# Patient Record
Sex: MALE | Race: ASIAN | NOT HISPANIC OR LATINO | Employment: OTHER
[De-identification: names, ages, dates, MRNs, and addresses within clinical notes are randomized per-mention and may not be internally consistent; named-entity substitution may affect disease eponyms.]

---

## 2020-01-01 ENCOUNTER — EXTERNAL RECORD (OUTPATIENT)
Dept: HEALTH INFORMATION MANAGEMENT | Facility: OTHER | Age: 80
End: 2020-01-01

## 2020-11-12 PROBLEM — E11.22 TYPE 2 DIABETES MELLITUS WITH CHRONIC KIDNEY DISEASE ON CHRONIC DIALYSIS, WITH LONG-TERM CURRENT USE OF INSULIN (HCC): Status: ACTIVE | Noted: 2020-11-12

## 2020-11-12 PROBLEM — I15.2 HYPERTENSION ASSOCIATED WITH DIABETES (HCC): Status: ACTIVE | Noted: 2020-11-12

## 2020-11-12 PROBLEM — E11.59 HYPERTENSION ASSOCIATED WITH DIABETES (HCC): Status: ACTIVE | Noted: 2020-11-12

## 2020-11-12 PROBLEM — N18.6 TYPE 2 DIABETES MELLITUS WITH CHRONIC KIDNEY DISEASE ON CHRONIC DIALYSIS, WITH LONG-TERM CURRENT USE OF INSULIN (HCC): Status: ACTIVE | Noted: 2020-11-12

## 2020-11-12 PROBLEM — Z99.2 TYPE 2 DIABETES MELLITUS WITH CHRONIC KIDNEY DISEASE ON CHRONIC DIALYSIS, WITH LONG-TERM CURRENT USE OF INSULIN (HCC): Status: ACTIVE | Noted: 2020-11-12

## 2020-11-12 PROBLEM — E11.319 TYPE 2 DIABETES MELLITUS WITH RETINOPATHY, WITH LONG-TERM CURRENT USE OF INSULIN, MACULAR EDEMA PRESENCE UNSPECIFIED, UNSPECIFIED LATERALITY, UNSPECIFIED RETINOPATHY SEVERITY (HCC): Status: ACTIVE | Noted: 2020-11-12

## 2020-11-12 PROBLEM — E11.59 HYPERTENSION ASSOCIATED WITH DIABETES: Status: ACTIVE | Noted: 2020-11-12

## 2020-11-12 PROBLEM — Z79.4 TYPE 2 DIABETES MELLITUS WITH RETINOPATHY, WITH LONG-TERM CURRENT USE OF INSULIN, MACULAR EDEMA PRESENCE UNSPECIFIED, UNSPECIFIED LATERALITY, UNSPECIFIED RETINOPATHY SEVERITY (HCC): Status: ACTIVE | Noted: 2020-11-12

## 2020-11-12 PROBLEM — I15.2 HYPERTENSION ASSOCIATED WITH DIABETES  (HCC): Status: ACTIVE | Noted: 2020-11-12

## 2020-11-12 PROBLEM — E11.59 HYPERTENSION ASSOCIATED WITH DIABETES  (HCC): Status: ACTIVE | Noted: 2020-11-12

## 2020-11-12 PROBLEM — E11.42 TYPE 2 DIABETES MELLITUS WITH POLYNEUROPATHY (HCC): Status: ACTIVE | Noted: 2020-11-12

## 2020-11-12 PROBLEM — Z79.4 TYPE 2 DIABETES MELLITUS WITH CHRONIC KIDNEY DISEASE ON CHRONIC DIALYSIS, WITH LONG-TERM CURRENT USE OF INSULIN (HCC): Status: ACTIVE | Noted: 2020-11-12

## 2020-11-12 PROBLEM — I15.2 HYPERTENSION ASSOCIATED WITH DIABETES: Status: ACTIVE | Noted: 2020-11-12

## 2020-12-01 ENCOUNTER — OFFICE VISIT (OUTPATIENT)
Dept: CARDIOLOGY | Age: 80
End: 2020-12-01

## 2020-12-01 VITALS
SYSTOLIC BLOOD PRESSURE: 144 MMHG | HEART RATE: 64 BPM | DIASTOLIC BLOOD PRESSURE: 70 MMHG | WEIGHT: 182 LBS | HEIGHT: 69 IN | BODY MASS INDEX: 26.96 KG/M2

## 2020-12-01 DIAGNOSIS — I44.7 LBBB (LEFT BUNDLE BRANCH BLOCK): Primary | ICD-10-CM

## 2020-12-01 DIAGNOSIS — Z99.2 CKD (CHRONIC KIDNEY DISEASE) REQUIRING CHRONIC DIALYSIS (CMD): ICD-10-CM

## 2020-12-01 DIAGNOSIS — N18.6 CKD (CHRONIC KIDNEY DISEASE) REQUIRING CHRONIC DIALYSIS (CMD): ICD-10-CM

## 2020-12-01 DIAGNOSIS — I10 ESSENTIAL HYPERTENSION: ICD-10-CM

## 2020-12-01 DIAGNOSIS — I95.1 ORTHOSTATIC HYPOTENSION: ICD-10-CM

## 2020-12-01 PROCEDURE — 3077F SYST BP >= 140 MM HG: CPT | Performed by: INTERNAL MEDICINE

## 2020-12-01 PROCEDURE — 99245 OFF/OP CONSLTJ NEW/EST HI 55: CPT | Performed by: INTERNAL MEDICINE

## 2020-12-01 PROCEDURE — 3078F DIAST BP <80 MM HG: CPT | Performed by: INTERNAL MEDICINE

## 2020-12-01 PROCEDURE — 93000 ELECTROCARDIOGRAM COMPLETE: CPT | Performed by: INTERNAL MEDICINE

## 2020-12-01 RX ORDER — MIDODRINE HYDROCHLORIDE 5 MG/1
TABLET ORAL
COMMUNITY

## 2020-12-01 RX ORDER — AMLODIPINE BESYLATE 10 MG/1
10 TABLET ORAL DAILY
COMMUNITY
Start: 2020-10-14

## 2020-12-01 RX ORDER — INSULIN LISPRO 100 [IU]/ML
INJECTION, SUSPENSION SUBCUTANEOUS
COMMUNITY
Start: 2020-11-12

## 2020-12-01 RX ORDER — CLONAZEPAM 0.5 MG/1
TABLET ORAL
COMMUNITY
Start: 2020-11-25

## 2020-12-01 RX ORDER — GABAPENTIN 600 MG/1
600 TABLET ORAL 3 TIMES DAILY
COMMUNITY
Start: 2020-10-14

## 2020-12-01 RX ORDER — CALCIUM ACETATE 667 MG/1
CAPSULE ORAL
COMMUNITY

## 2020-12-01 RX ORDER — ESCITALOPRAM OXALATE 20 MG/1
TABLET ORAL
COMMUNITY
Start: 2020-10-14 | End: 2021-10-05 | Stop reason: DRUGHIGH

## 2020-12-01 RX ORDER — HYDRALAZINE HYDROCHLORIDE 25 MG/1
TABLET, FILM COATED ORAL
COMMUNITY
Start: 2020-11-04

## 2020-12-01 RX ORDER — ASPIRIN 81 MG/1
TABLET, CHEWABLE ORAL
COMMUNITY

## 2020-12-01 RX ORDER — FOLIC ACID 1 MG/1
1000 TABLET ORAL DAILY
COMMUNITY
Start: 2020-10-14

## 2020-12-01 RX ORDER — LENALIDOMIDE 5 MG/1
CAPSULE ORAL
COMMUNITY

## 2020-12-01 RX ORDER — CALCITRIOL 0.5 UG/1
CAPSULE, LIQUID FILLED ORAL
COMMUNITY

## 2020-12-01 RX ORDER — LOSARTAN POTASSIUM 100 MG/1
100 TABLET ORAL DAILY
COMMUNITY
Start: 2020-10-14

## 2020-12-01 RX ORDER — DOCUSATE SODIUM 100 MG/1
CAPSULE, LIQUID FILLED ORAL
COMMUNITY

## 2020-12-01 ASSESSMENT — ENCOUNTER SYMPTOMS
ALLERGIC/IMMUNOLOGIC COMMENTS: NO NEW FOOD ALLERGIES
HEMATOCHEZIA: 0
HEMOPTYSIS: 0
WEIGHT GAIN: 0
WEIGHT LOSS: 0
COUGH: 0
BRUISES/BLEEDS EASILY: 0
SUSPICIOUS LESIONS: 0
FEVER: 0
CHILLS: 0

## 2020-12-01 ASSESSMENT — PATIENT HEALTH QUESTIONNAIRE - PHQ9
SUM OF ALL RESPONSES TO PHQ9 QUESTIONS 1 AND 2: 0
1. LITTLE INTEREST OR PLEASURE IN DOING THINGS: NOT AT ALL
2. FEELING DOWN, DEPRESSED OR HOPELESS: NOT AT ALL
CLINICAL INTERPRETATION OF PHQ2 SCORE: NO FURTHER SCREENING NEEDED
SUM OF ALL RESPONSES TO PHQ9 QUESTIONS 1 AND 2: 0
CLINICAL INTERPRETATION OF PHQ9 SCORE: NO FURTHER SCREENING NEEDED

## 2020-12-02 ENCOUNTER — ORDER TRANSCRIPTION (OUTPATIENT)
Dept: ADMINISTRATIVE | Facility: HOSPITAL | Age: 80
End: 2020-12-02

## 2020-12-02 ENCOUNTER — OFFICE VISIT (OUTPATIENT)
Dept: FAMILY MEDICINE CLINIC | Facility: CLINIC | Age: 80
End: 2020-12-02
Payer: MEDICAID

## 2020-12-02 ENCOUNTER — MED REC SCAN ONLY (OUTPATIENT)
Dept: FAMILY MEDICINE CLINIC | Facility: CLINIC | Age: 80
End: 2020-12-02

## 2020-12-02 ENCOUNTER — PATIENT MESSAGE (OUTPATIENT)
Dept: FAMILY MEDICINE CLINIC | Facility: CLINIC | Age: 80
End: 2020-12-02

## 2020-12-02 VITALS
SYSTOLIC BLOOD PRESSURE: 146 MMHG | RESPIRATION RATE: 16 BRPM | BODY MASS INDEX: 26.66 KG/M2 | HEART RATE: 67 BPM | HEIGHT: 69 IN | TEMPERATURE: 97 F | WEIGHT: 180 LBS | OXYGEN SATURATION: 96 % | DIASTOLIC BLOOD PRESSURE: 68 MMHG

## 2020-12-02 DIAGNOSIS — Z99.2 TYPE 2 DIABETES MELLITUS WITH CHRONIC KIDNEY DISEASE ON CHRONIC DIALYSIS, WITH LONG-TERM CURRENT USE OF INSULIN (HCC): ICD-10-CM

## 2020-12-02 DIAGNOSIS — Z79.4 TYPE 2 DIABETES MELLITUS WITH CHRONIC KIDNEY DISEASE ON CHRONIC DIALYSIS, WITH LONG-TERM CURRENT USE OF INSULIN (HCC): ICD-10-CM

## 2020-12-02 DIAGNOSIS — E11.22 TYPE 2 DIABETES MELLITUS WITH CHRONIC KIDNEY DISEASE ON CHRONIC DIALYSIS, WITH LONG-TERM CURRENT USE OF INSULIN (HCC): ICD-10-CM

## 2020-12-02 DIAGNOSIS — Z13.9 ENCOUNTER FOR SCREENING: Primary | ICD-10-CM

## 2020-12-02 DIAGNOSIS — Z99.2 CKD (CHRONIC KIDNEY DISEASE) REQUIRING CHRONIC DIALYSIS (HCC): ICD-10-CM

## 2020-12-02 DIAGNOSIS — N18.6 CKD (CHRONIC KIDNEY DISEASE) REQUIRING CHRONIC DIALYSIS (HCC): ICD-10-CM

## 2020-12-02 DIAGNOSIS — C90.00 MULTIPLE MYELOMA, REMISSION STATUS UNSPECIFIED (HCC): ICD-10-CM

## 2020-12-02 DIAGNOSIS — Z00.00 ENCOUNTER FOR ANNUAL PHYSICAL EXAM: Primary | ICD-10-CM

## 2020-12-02 DIAGNOSIS — E11.59 HYPERTENSION ASSOCIATED WITH DIABETES (HCC): ICD-10-CM

## 2020-12-02 DIAGNOSIS — N18.6 TYPE 2 DIABETES MELLITUS WITH CHRONIC KIDNEY DISEASE ON CHRONIC DIALYSIS, WITH LONG-TERM CURRENT USE OF INSULIN (HCC): ICD-10-CM

## 2020-12-02 DIAGNOSIS — I15.2 HYPERTENSION ASSOCIATED WITH DIABETES (HCC): ICD-10-CM

## 2020-12-02 PROBLEM — I95.1 ORTHOSTATIC HYPOTENSION: Status: ACTIVE | Noted: 2020-12-01

## 2020-12-02 PROBLEM — I44.7 LBBB (LEFT BUNDLE BRANCH BLOCK): Status: ACTIVE | Noted: 2020-12-01

## 2020-12-02 PROBLEM — N18.9 CHRONIC KIDNEY FAILURE: Status: ACTIVE | Noted: 2020-12-02

## 2020-12-02 PROCEDURE — 3078F DIAST BP <80 MM HG: CPT | Performed by: FAMILY MEDICINE

## 2020-12-02 PROCEDURE — 3077F SYST BP >= 140 MM HG: CPT | Performed by: FAMILY MEDICINE

## 2020-12-02 PROCEDURE — 99397 PER PM REEVAL EST PAT 65+ YR: CPT | Performed by: FAMILY MEDICINE

## 2020-12-02 PROCEDURE — 3008F BODY MASS INDEX DOCD: CPT | Performed by: FAMILY MEDICINE

## 2020-12-02 RX ORDER — MIDODRINE HYDROCHLORIDE 5 MG/1
TABLET ORAL
COMMUNITY
End: 2020-12-02

## 2020-12-03 ENCOUNTER — PATIENT MESSAGE (OUTPATIENT)
Dept: FAMILY MEDICINE CLINIC | Facility: CLINIC | Age: 80
End: 2020-12-03

## 2020-12-03 NOTE — PROGRESS NOTES
/68   Pulse 67   Temp 96.6 °F (35.9 °C) (Temporal)   Resp 16   Ht 5' 9\" (1.753 m)   Wt 180 lb (81.6 kg)   SpO2 96%   BMI 26.58 kg/m²  Body mass index is 26.58 kg/m².      Patient presents with:  Establish Care  Physical      Sharmila Mahoney is a 79 ye 1   • Continuous Blood Gluc  (DEXCOM G6 ) Does not apply Device 1 strip by In Vitro route 5 (five) times daily. • amLODIPine Besylate 10 MG Oral Tab Take 10 mg by mouth.      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 Shashank Chavez  Multiple myeloma, remission status unspecified ([facility].)  Stable at this time  Follow-up

## 2020-12-03 NOTE — TELEPHONE ENCOUNTER
From: Akshat Kelly  To: Navneet Monique MD  Sent: 12/2/2020 2:09 PM CST  Subject: Referral Request    Suhas Dr Ella Bernard,  I was trying to schedule for 24 hour blood pressure monitor and ct heart calcium scoring for my dad.  I was informed that the insurance requir

## 2020-12-03 NOTE — TELEPHONE ENCOUNTER
See other PM 12/3/20:    Suellen Cortez to Jaems Houser MD    12/3/20 11:12 AM  please ignore my previous email. I was mis informed. His tests does not require referral at this time. sorry for the inconvenience. Thank you.

## 2020-12-03 NOTE — TELEPHONE ENCOUNTER
From: Patricia Mortensen  To: Carlyn Peters MD  Sent: 12/3/2020 11:12 AM CST  Subject: Referral Request    please ignore my previous email. I was mis informed. His tests does not require referral at this time. sorry for the inconvenience. Thank you.

## 2020-12-04 ENCOUNTER — ANCILLARY PROCEDURE (OUTPATIENT)
Dept: CARDIOLOGY | Age: 80
End: 2020-12-04
Attending: INTERNAL MEDICINE

## 2020-12-04 DIAGNOSIS — I95.1 ORTHOSTATIC HYPOTENSION: ICD-10-CM

## 2020-12-04 DIAGNOSIS — I44.7 LBBB (LEFT BUNDLE BRANCH BLOCK): ICD-10-CM

## 2020-12-04 DIAGNOSIS — I10 ESSENTIAL HYPERTENSION: ICD-10-CM

## 2020-12-04 DIAGNOSIS — N18.6 CKD (CHRONIC KIDNEY DISEASE) REQUIRING CHRONIC DIALYSIS (CMD): ICD-10-CM

## 2020-12-04 DIAGNOSIS — Z99.2 CKD (CHRONIC KIDNEY DISEASE) REQUIRING CHRONIC DIALYSIS (CMD): ICD-10-CM

## 2020-12-04 PROCEDURE — 93784 AMBL BP MNTR W/SOFTWARE: CPT | Performed by: INTERNAL MEDICINE

## 2020-12-07 ENCOUNTER — TELEPHONE (OUTPATIENT)
Dept: CARDIOLOGY | Age: 80
End: 2020-12-07

## 2020-12-08 ENCOUNTER — TELEPHONE (OUTPATIENT)
Dept: CARDIOLOGY | Age: 80
End: 2020-12-08

## 2020-12-14 ENCOUNTER — HOSPITAL ENCOUNTER (OUTPATIENT)
Dept: CT IMAGING | Facility: HOSPITAL | Age: 80
Discharge: HOME | End: 2020-12-14
Attending: INTERNAL MEDICINE

## 2020-12-14 DIAGNOSIS — Z13.9 ENCOUNTER FOR SCREENING: ICD-10-CM

## 2020-12-14 DIAGNOSIS — Z13.6 SCREENING FOR CARDIOVASCULAR CONDITION: ICD-10-CM

## 2020-12-15 NOTE — PROGRESS NOTES
Pt seen at [facility] for CTHS:  PRELIMINARY LAEMM=312.16    *patient denies any chest pain/pressure or shortness of breath at this time. Phone consultation complete.   All results and risk factors discussed with patient and daughter Johana Cerrato; all qu

## 2020-12-17 ENCOUNTER — TELEPHONE (OUTPATIENT)
Dept: FAMILY MEDICINE CLINIC | Facility: CLINIC | Age: 80
End: 2020-12-17

## 2021-01-05 ENCOUNTER — OFFICE VISIT (OUTPATIENT)
Dept: CARDIOLOGY | Age: 81
End: 2021-01-05

## 2021-01-05 VITALS
WEIGHT: 176 LBS | HEIGHT: 69 IN | HEART RATE: 60 BPM | SYSTOLIC BLOOD PRESSURE: 150 MMHG | BODY MASS INDEX: 26.07 KG/M2 | DIASTOLIC BLOOD PRESSURE: 70 MMHG

## 2021-01-05 DIAGNOSIS — R93.1 ELEVATED CORONARY ARTERY CALCIUM SCORE: ICD-10-CM

## 2021-01-05 DIAGNOSIS — I44.7 LBBB (LEFT BUNDLE BRANCH BLOCK): ICD-10-CM

## 2021-01-05 DIAGNOSIS — I95.1 ORTHOSTATIC HYPOTENSION: Primary | ICD-10-CM

## 2021-01-05 DIAGNOSIS — Z99.2 CKD (CHRONIC KIDNEY DISEASE) REQUIRING CHRONIC DIALYSIS (CMD): ICD-10-CM

## 2021-01-05 DIAGNOSIS — E78.00 HYPERCHOLESTEREMIA: ICD-10-CM

## 2021-01-05 DIAGNOSIS — N18.6 CKD (CHRONIC KIDNEY DISEASE) REQUIRING CHRONIC DIALYSIS (CMD): ICD-10-CM

## 2021-01-05 PROBLEM — Z86.73 HISTORY OF STROKE: Status: ACTIVE | Noted: 2021-01-05

## 2021-01-05 PROCEDURE — 3077F SYST BP >= 140 MM HG: CPT | Performed by: INTERNAL MEDICINE

## 2021-01-05 PROCEDURE — 99215 OFFICE O/P EST HI 40 MIN: CPT | Performed by: INTERNAL MEDICINE

## 2021-01-05 PROCEDURE — 3078F DIAST BP <80 MM HG: CPT | Performed by: INTERNAL MEDICINE

## 2021-01-05 RX ORDER — ROSUVASTATIN CALCIUM 10 MG/1
10 TABLET, COATED ORAL DAILY
Qty: 90 TABLET | Refills: 3 | Status: SHIPPED | OUTPATIENT
Start: 2021-01-05 | End: 2021-12-21 | Stop reason: SDUPTHER

## 2021-01-05 RX ORDER — PROCHLORPERAZINE 25 MG/1
SUPPOSITORY RECTAL
COMMUNITY
Start: 2020-12-05

## 2021-01-05 ASSESSMENT — ENCOUNTER SYMPTOMS
WEIGHT GAIN: 0
COUGH: 0
WEIGHT LOSS: 0
CHILLS: 0
BRUISES/BLEEDS EASILY: 0
FEVER: 0
ALLERGIC/IMMUNOLOGIC COMMENTS: NO NEW FOOD ALLERGIES
HEMATOCHEZIA: 0
HEMOPTYSIS: 0
SUSPICIOUS LESIONS: 0

## 2021-01-06 ENCOUNTER — TELEPHONE (OUTPATIENT)
Dept: FAMILY MEDICINE CLINIC | Facility: CLINIC | Age: 81
End: 2021-01-06

## 2021-01-27 DIAGNOSIS — Z23 NEED FOR VACCINATION: ICD-10-CM

## 2021-01-31 ENCOUNTER — IMMUNIZATION (OUTPATIENT)
Dept: LAB | Age: 81
End: 2021-01-31
Attending: HOSPITALIST
Payer: MEDICAID

## 2021-01-31 DIAGNOSIS — Z23 NEED FOR VACCINATION: Primary | ICD-10-CM

## 2021-01-31 PROCEDURE — 0001A SARSCOV2 VAC 30MCG/0.3ML IM: CPT

## 2021-02-03 ENCOUNTER — APPOINTMENT (OUTPATIENT)
Dept: CARDIOLOGY | Age: 81
End: 2021-02-03

## 2021-02-14 ENCOUNTER — PATIENT MESSAGE (OUTPATIENT)
Dept: FAMILY MEDICINE CLINIC | Facility: CLINIC | Age: 81
End: 2021-02-14

## 2021-02-15 RX ORDER — TRAZODONE HYDROCHLORIDE 50 MG/1
50 TABLET ORAL NIGHTLY PRN
Qty: 30 TABLET | Refills: 0 | Status: SHIPPED | OUTPATIENT
Start: 2021-02-15 | End: 2021-03-31

## 2021-02-15 RX ORDER — ESCITALOPRAM OXALATE 20 MG/1
TABLET ORAL
Qty: 30 TABLET | Refills: 3 | Status: SHIPPED | OUTPATIENT
Start: 2021-02-15 | End: 2021-06-28

## 2021-02-15 NOTE — TELEPHONE ENCOUNTER
LOV: 12/2/20    Lexapro and clonazepam has never been filled by us. Follow up visit?     Please advise

## 2021-02-15 NOTE — TELEPHONE ENCOUNTER
From: Tyron Barajas  To: Bronson Galindo MD  Sent: 2/14/2021 6:30 PM CST  Subject: Prescription Question    Hello,  My dad needs a refill on his lexapro 20 mg tabs taking 1 tablet by mouth daily.   and clonazepam 0.5 mg he takes that 1 tab prn. however, he says

## 2021-02-21 ENCOUNTER — IMMUNIZATION (OUTPATIENT)
Dept: LAB | Age: 81
End: 2021-02-21
Attending: HOSPITALIST
Payer: MEDICAID

## 2021-02-21 DIAGNOSIS — Z23 NEED FOR VACCINATION: Primary | ICD-10-CM

## 2021-02-21 PROCEDURE — 0002A SARSCOV2 VAC 30MCG/0.3ML IM: CPT

## 2021-02-25 ENCOUNTER — PATIENT MESSAGE (OUTPATIENT)
Dept: FAMILY MEDICINE CLINIC | Facility: CLINIC | Age: 81
End: 2021-02-25

## 2021-03-23 ENCOUNTER — MED REC SCAN ONLY (OUTPATIENT)
Dept: FAMILY MEDICINE CLINIC | Facility: CLINIC | Age: 81
End: 2021-03-23

## 2021-03-31 NOTE — TELEPHONE ENCOUNTER
LOV 12/2/2020    LAST LAB    LAST RX  traZODone HCl 50 MG Oral Tab 30 tablet 0 2/15/2021          Next OV   Future Appointments   Date Time Provider James Jensen   4/7/2021  4:00 PM Kenneth Ferrer MD [facility]   4/19/2021  9:40 AM Sh

## 2021-04-01 ENCOUNTER — PATIENT MESSAGE (OUTPATIENT)
Dept: FAMILY MEDICINE CLINIC | Facility: CLINIC | Age: 81
End: 2021-04-01

## 2021-04-01 RX ORDER — TRAZODONE HYDROCHLORIDE 50 MG/1
50 TABLET ORAL NIGHTLY PRN
Qty: 30 TABLET | Refills: 0 | Status: SHIPPED | OUTPATIENT
Start: 2021-04-01 | End: 2021-05-31

## 2021-04-02 RX ORDER — FOLIC ACID 1 MG/1
1 TABLET ORAL DAILY
Qty: 90 TABLET | Refills: 1 | Status: SHIPPED | OUTPATIENT
Start: 2021-04-02 | End: 2021-07-25

## 2021-04-02 NOTE — TELEPHONE ENCOUNTER
LOV: 12/2/20  Last Labs: 1/11/21 (not ordered by Dr. Ermias Fuentes)   Last RX:   folic acid 1 MG Oral Tab   10/14/2020       Next OV: no future appointments

## 2021-04-02 NOTE — TELEPHONE ENCOUNTER
From: Albert Lucia  To: Bayron Carey MD  Sent: 4/1/2021 9:18 PM CDT  Subject: Prescription Question    Thank you so much for sending my dad's prescription. I forgot to mention one more. He is also in need of Folic acid 1 mg tabs that he takes once daily.  I

## 2021-04-07 ENCOUNTER — OFFICE VISIT (OUTPATIENT)
Dept: NEPHROLOGY | Facility: CLINIC | Age: 81
End: 2021-04-07
Payer: MEDICAID

## 2021-04-07 VITALS — SYSTOLIC BLOOD PRESSURE: 142 MMHG | BODY MASS INDEX: 27 KG/M2 | WEIGHT: 180 LBS | DIASTOLIC BLOOD PRESSURE: 70 MMHG

## 2021-04-07 DIAGNOSIS — N18.6 ESRD ON HEMODIALYSIS (HCC): Primary | ICD-10-CM

## 2021-04-07 DIAGNOSIS — Z99.2 ESRD ON HEMODIALYSIS (HCC): Primary | ICD-10-CM

## 2021-04-20 NOTE — TELEPHONE ENCOUNTER
Last office visit: (if over 1 year, schedule appointment)    12/2/20    Appointment scheduled with: Dr. Aparna Melendrez    Requested medication: Losartan and amlodipine     [place]  [place] [id number] , [phone]

## 2021-04-21 RX ORDER — AMLODIPINE BESYLATE 10 MG/1
10 TABLET ORAL DAILY
Qty: 90 TABLET | Refills: 1 | Status: SHIPPED | OUTPATIENT
Start: 2021-04-21 | End: 2021-10-24

## 2021-04-21 RX ORDER — LOSARTAN POTASSIUM 100 MG/1
100 TABLET ORAL DAILY
Qty: 90 TABLET | Refills: 1 | Status: SHIPPED | OUTPATIENT
Start: 2021-04-21 | End: 2021-10-24

## 2021-04-21 NOTE — TELEPHONE ENCOUNTER
LOV 12/2/2020    LAST LAB 4/19/2021    LAST RX   losartan 100 MG Oral Tab   10/14/2020     amLODIPine Besylate 10 MG Oral Tab   7/15/2020           Next OV   Future Appointments   Date Time Provider James Jensen   6/9/2021  1:45 PM MD Carin Borden

## 2021-04-22 ENCOUNTER — MED REC SCAN ONLY (OUTPATIENT)
Dept: FAMILY MEDICINE CLINIC | Facility: CLINIC | Age: 81
End: 2021-04-22

## 2021-04-22 ENCOUNTER — TELEPHONE (OUTPATIENT)
Dept: FAMILY MEDICINE CLINIC | Facility: CLINIC | Age: 81
End: 2021-04-22

## 2021-05-17 ENCOUNTER — APPOINTMENT (OUTPATIENT)
Dept: GENERAL RADIOLOGY | Facility: HOSPITAL | Age: 81
DRG: 193 | End: 2021-05-17
Attending: EMERGENCY MEDICINE
Payer: MEDICAID

## 2021-05-17 ENCOUNTER — TELEMEDICINE (OUTPATIENT)
Dept: TELEHEALTH | Age: 81
End: 2021-05-17

## 2021-05-17 ENCOUNTER — HOSPITAL ENCOUNTER (INPATIENT)
Facility: HOSPITAL | Age: 81
LOS: 1 days | Discharge: HOME | DRG: 193 | End: 2021-05-18
Attending: EMERGENCY MEDICINE | Admitting: INTERNAL MEDICINE
Payer: MEDICAID

## 2021-05-17 DIAGNOSIS — Z99.2 ESRD (END STAGE RENAL DISEASE) ON DIALYSIS (HCC): ICD-10-CM

## 2021-05-17 DIAGNOSIS — R50.9 FEVER, UNSPECIFIED FEVER CAUSE: ICD-10-CM

## 2021-05-17 DIAGNOSIS — R06.02 SHORTNESS OF BREATH: Primary | ICD-10-CM

## 2021-05-17 DIAGNOSIS — N18.6 ESRD (END STAGE RENAL DISEASE) ON DIALYSIS (HCC): ICD-10-CM

## 2021-05-17 DIAGNOSIS — R09.89 CHEST CONGESTION: ICD-10-CM

## 2021-05-17 DIAGNOSIS — J18.9 COMMUNITY ACQUIRED PNEUMONIA, UNSPECIFIED LATERALITY: Primary | ICD-10-CM

## 2021-05-17 PROBLEM — N17.9 ACUTE RENAL FAILURE (ARF): Status: ACTIVE | Noted: 2021-05-17

## 2021-05-17 PROBLEM — D64.9 ANEMIA: Status: ACTIVE | Noted: 2021-05-17

## 2021-05-17 PROBLEM — N17.9 ACUTE KIDNEY INJURY (HCC): Status: ACTIVE | Noted: 2021-05-17

## 2021-05-17 PROBLEM — N17.9 ACUTE KIDNEY INJURY: Status: ACTIVE | Noted: 2021-05-17

## 2021-05-17 PROBLEM — D69.6 THROMBOCYTOPENIA (HCC): Status: ACTIVE | Noted: 2021-05-17

## 2021-05-17 PROBLEM — D69.6 THROMBOCYTOPENIA: Status: ACTIVE | Noted: 2021-05-17

## 2021-05-17 PROBLEM — N17.9 ACUTE RENAL FAILURE (ARF) (HCC): Status: ACTIVE | Noted: 2021-05-17

## 2021-05-17 PROCEDURE — 99499 UNLISTED E&M SERVICE: CPT | Performed by: PHYSICIAN ASSISTANT

## 2021-05-17 PROCEDURE — 99223 1ST HOSP IP/OBS HIGH 75: CPT | Performed by: INTERNAL MEDICINE

## 2021-05-17 PROCEDURE — 71045 X-RAY EXAM CHEST 1 VIEW: CPT | Performed by: EMERGENCY MEDICINE

## 2021-05-17 RX ORDER — ACETAMINOPHEN 500 MG
1000 TABLET ORAL ONCE
Status: COMPLETED | OUTPATIENT
Start: 2021-05-17 | End: 2021-05-17

## 2021-05-17 RX ORDER — ACETAMINOPHEN 500 MG
TABLET ORAL
Status: COMPLETED
Start: 2021-05-17 | End: 2021-05-17

## 2021-05-17 RX ORDER — HEPARIN SODIUM 5000 [USP'U]/ML
5000 INJECTION, SOLUTION INTRAVENOUS; SUBCUTANEOUS EVERY 12 HOURS SCHEDULED
Status: DISCONTINUED | OUTPATIENT
Start: 2021-05-17 | End: 2021-05-17

## 2021-05-17 RX ORDER — ACETAMINOPHEN 325 MG/1
650 TABLET ORAL EVERY 6 HOURS PRN
Status: DISCONTINUED | OUTPATIENT
Start: 2021-05-17 | End: 2021-05-18

## 2021-05-17 RX ORDER — DEXTROSE MONOHYDRATE 25 G/50ML
50 INJECTION, SOLUTION INTRAVENOUS
Status: DISCONTINUED | OUTPATIENT
Start: 2021-05-17 | End: 2021-05-18

## 2021-05-17 RX ORDER — ONDANSETRON 2 MG/ML
4 INJECTION INTRAMUSCULAR; INTRAVENOUS EVERY 6 HOURS PRN
Status: DISCONTINUED | OUTPATIENT
Start: 2021-05-17 | End: 2021-05-18

## 2021-05-17 RX ORDER — ROSUVASTATIN CALCIUM 10 MG/1
10 TABLET, COATED ORAL NIGHTLY
COMMUNITY
End: 2021-12-22

## 2021-05-17 NOTE — PROGRESS NOTES
CHIEF COMPLAINT:     Patient presents with:  Cough: yesterday, exposed to illness in family members, no known covid exposure that daughter aware of  Shortness Of Breath: started today      HPI:   Rehan Galicia is a 79year old male with extensive medical hx & Lispro (HUMALOG MIX 75/25 KWIKPEN) (75-25) 100 UNIT/ML SC SUPN Inject 20 Units into the skin 2 (two) times a day. 5 pen 3   • Insulin Pen Needle (BD PEN NEEDLE BETH U/F) 32G X 4 MM Does not apply Misc 1 each by Other route 2 (two) times a day.  1 Box 3 hour(s)). ASSESSMENT:   Pricilla Chairez is a 79year old male who presents for video visit with Cough (yesterday, exposed to illness in family members, no known covid exposure that daughter aware of) and Shortness Of Breath (started today).  Symptoms are co

## 2021-05-18 VITALS
HEART RATE: 65 BPM | HEIGHT: 69 IN | DIASTOLIC BLOOD PRESSURE: 69 MMHG | OXYGEN SATURATION: 99 % | WEIGHT: 174 LBS | TEMPERATURE: 99 F | BODY MASS INDEX: 25.77 KG/M2 | RESPIRATION RATE: 20 BRPM | SYSTOLIC BLOOD PRESSURE: 159 MMHG

## 2021-05-18 PROCEDURE — 99239 HOSP IP/OBS DSCHRG MGMT >30: CPT | Performed by: HOSPITALIST

## 2021-05-18 PROCEDURE — 99223 1ST HOSP IP/OBS HIGH 75: CPT | Performed by: INTERNAL MEDICINE

## 2021-05-18 PROCEDURE — 5A1D70Z PERFORMANCE OF URINARY FILTRATION, INTERMITTENT, LESS THAN 6 HOURS PER DAY: ICD-10-PCS | Performed by: INTERNAL MEDICINE

## 2021-05-18 RX ORDER — TRAZODONE HYDROCHLORIDE 50 MG/1
50 TABLET ORAL NIGHTLY PRN
Status: DISCONTINUED | OUTPATIENT
Start: 2021-05-18 | End: 2021-05-18

## 2021-05-18 RX ORDER — GABAPENTIN 300 MG/1
300 CAPSULE ORAL 3 TIMES DAILY
Status: DISCONTINUED | OUTPATIENT
Start: 2021-05-18 | End: 2021-05-18

## 2021-05-18 RX ORDER — LOSARTAN POTASSIUM 100 MG/1
100 TABLET ORAL DAILY
Status: DISCONTINUED | OUTPATIENT
Start: 2021-05-18 | End: 2021-05-18

## 2021-05-18 RX ORDER — ESCITALOPRAM OXALATE 20 MG/1
20 TABLET ORAL EVERY MORNING
Status: DISCONTINUED | OUTPATIENT
Start: 2021-05-18 | End: 2021-05-18

## 2021-05-18 RX ORDER — CALCIUM ACETATE 667 MG/1
2 CAPSULE ORAL
Status: DISCONTINUED | OUTPATIENT
Start: 2021-05-18 | End: 2021-05-18

## 2021-05-18 RX ORDER — DOCUSATE SODIUM 100 MG/1
100 CAPSULE, LIQUID FILLED ORAL 2 TIMES DAILY
Status: DISCONTINUED | OUTPATIENT
Start: 2021-05-18 | End: 2021-05-18

## 2021-05-18 RX ORDER — GABAPENTIN 600 MG/1
300 TABLET ORAL 3 TIMES DAILY
Status: DISCONTINUED | OUTPATIENT
Start: 2021-05-18 | End: 2021-05-18 | Stop reason: RX

## 2021-05-18 RX ORDER — CLONAZEPAM 0.5 MG/1
0.5 TABLET ORAL
Status: DISCONTINUED | OUTPATIENT
Start: 2021-05-18 | End: 2021-05-18

## 2021-05-18 RX ORDER — AMLODIPINE BESYLATE 5 MG/1
10 TABLET ORAL DAILY
Status: DISCONTINUED | OUTPATIENT
Start: 2021-05-18 | End: 2021-05-18

## 2021-05-18 RX ORDER — ALBUMIN (HUMAN) 12.5 G/50ML
100 SOLUTION INTRAVENOUS AS NEEDED
Status: DISCONTINUED | OUTPATIENT
Start: 2021-05-18 | End: 2021-05-18

## 2021-05-18 RX ORDER — ROSUVASTATIN CALCIUM 10 MG/1
10 TABLET, COATED ORAL NIGHTLY
Status: DISCONTINUED | OUTPATIENT
Start: 2021-05-18 | End: 2021-05-18

## 2021-05-18 RX ORDER — LIDOCAINE AND PRILOCAINE 25; 25 MG/G; MG/G
CREAM TOPICAL AS NEEDED
Status: DISCONTINUED | OUTPATIENT
Start: 2021-05-18 | End: 2021-05-18

## 2021-05-18 RX ORDER — LEVOFLOXACIN 750 MG/1
750 TABLET ORAL
Qty: 4 TABLET | Refills: 0 | Status: SHIPPED | OUTPATIENT
Start: 2021-05-18 | End: 2021-05-25

## 2021-05-18 NOTE — ED PROVIDER NOTES
Patient Seen in: [facility] Emergency Department      History   Patient presents with:  Cough/URI    Stated Complaint: cough/congestion     HPI/Subjective:   HPI    Patient is 41-year-old male with a history of end-stage renal disease gets dialyzed well-nourished male sitting up breathing easily in no apparent distress. Patient is nontoxic in appearance. HEENT: Head is normocephalic, atraumatic. Pupils are 3 mm equally round and reactive to light. Oropharynx is clear.  Mucous membranes are moist.  N normal limits   TROPONIN I - Normal   RAPID SARS-COV-2 BY PCR - Normal   CBC WITH DIFFERENTIAL WITH PLATELET    Narrative: The following orders were created for panel order CBC WITH DIFFERENTIAL WITH PLATELET.   Procedure                               A will be admitted to the hospital for further care. Dr. Angella Mendiola and Dr. Hattie Weller were both notified from the ER. Patient was admitted for further care at this time.   Admission disposition: 5/17/2021  9:39 PM                                  Disposition and P

## 2021-05-18 NOTE — CONSULTS
[facility]  Report of Consultation    Rosana Dc Patient Status:  Inpatient    10/3/1940 MRN [id number]   [facility] 4NW-A Attending Kd Rosa MD   [facility] Day # 1 PCP Arlene Fitzpatrick MD     Reason for Consultation:  ESRD     History lidocaine-prilocaine (EMLA) cream, , Topical, PRN  •  epoetin armando-epbx (RETACRIT) 71332 UNIT/ML injection 10,000 Units, 10,000 Units, Intravenous, Once in dialysis  •  azithromycin (ZITHROMAX) 500 mg in sodium chloride 0.9% 250 mL IVPB, 500 mg, Intravenou Full range of motion of all extremities. Trace peripheral edema   Integument: No lesions. No erythema. Psychiatric: Appropriate mood and affect.     Laboratory:  Lab Results   Component Value Date    WBC 3.6 05/17/2021    HGB 9.1 05/17/2021    HCT 27.6 05

## 2021-05-18 NOTE — PAYOR COMM NOTE
--------------  ADMISSION REVIEW     Payor: Sachin Oliva #:  [id number]  Authorization Number: [id number]    Admit date: 5/17/21  Admit time: 10:55 PM       Patient Seen in: [facility] Emergency Department    H There is no cyanosis, clubbing, or edema appreciated. Pulses are 2+ and equal in all 4 extremities. There is an AV fistula with a palpable thrill noted in the left upper extremity. NEURO: Patient is awake, alert and oriented to time place and person.  Mot Disposition:  Admit  5/17/2021  9:39 pm      [facility]                                                               History & Physical   Chief Complaint: Cough, fever, congestion    History of Present Illness:  Demetrius Jaquez is a 79year old m 9.1 05/17/2021    HCT 27.6 05/17/2021    PLT 58.0 05/17/2021    CREATSERUM 8.66 05/17/2021    BUN 51 05/17/2021     05/17/2021    K 4.3 05/17/2021     05/17/2021    CO2 25.0 05/17/2021     05/17/2021    CA 8.6 05/17/2021    ALB 3.3 05/17 calcium acetate (PHOSLO) cap 1,334 mg     Date Action Dose Route User    5/18/2021 0937 Given 1,334 mg Oral Pooja Cedillo, RN      cefTRIAXone Sodium (ROCEPHIN) 1 g in sodium chloride 0.9% 100 mL IVPB-ADDV     Date Action Dose Route User    5/17/2021 0744

## 2021-05-18 NOTE — PLAN OF CARE
Patient reports increased SOB since this AM, no hypoxia, denies chest pain, RR at 24, message sent to Dr. Ryan Wright. Patient repositioned, SOB resolved. Return call from Dr. Ryan Wright- no new orders. No hypoxia. Patient tolerated HD 3L removed. Temp 99. 4

## 2021-05-18 NOTE — ED QUICK NOTES
Attempted twice to start an iv access unable to draw blood or gain iv access at this time will call ultrasound team for iv

## 2021-05-18 NOTE — CM/SW NOTE
05/18/21 1400   CM/SW Referral Data   Referral Source Physician   Reason for Referral Discharge planning   Informant Patient; Children   Social History   Recreational Drug/Alcohol Use no   Major Changes Last 6 Months no   Domestic/Partner Violence no   S

## 2021-05-18 NOTE — PLAN OF CARE
Call placed to annia to inform of HD order for today.      1735- message to Dr. Yaritza Puri to inform of temp 99.4

## 2021-05-18 NOTE — H&P
[facility]                                                               History & Physical         [facility]:  [facility]    10/3/1940 MRN [id number]   Location [facility] Attending Miryam Osborne MD 14 point review of systems was completed. Pertinent positives and negatives noted in the the HPI. Physical Exam:     Vital signs: Blood pressure (!) 166/68, pulse 60, temperature (!) 100.5 °F (38.1 °C), temperature source Temporal, resp.  rate 19, heigh edema suggesting CHF.  There are asymmetric right perihilar and right lower lobe interstitial opacities which may represent asymmetric CHF versus pneumonia.  No   significant effusion.  No mass.               Dictated by (CST): Anita Ford MD on 5/1

## 2021-05-19 NOTE — PAYOR COMM NOTE
--------------  DISCHARGE REVIEW    Payor: Sachin Oliva #:  [id number]  Authorization Number: [id number]    Admit date: 5/17/21  Admit time:  10:55 PM  Discharge Date: 5/18/2021  7:17 PM     Admitting Physician: Juan Antonio Ruiz

## 2021-05-20 ENCOUNTER — MED REC SCAN ONLY (OUTPATIENT)
Dept: FAMILY MEDICINE CLINIC | Facility: CLINIC | Age: 81
End: 2021-05-20

## 2021-05-20 ENCOUNTER — TELEPHONE (OUTPATIENT)
Dept: FAMILY MEDICINE CLINIC | Facility: CLINIC | Age: 81
End: 2021-05-20

## 2021-05-21 NOTE — DISCHARGE SUMMARY
[facility]  DISCHARGE SUMMARY     Sherry Valdes Patient Status:  Inpatient    10/3/1940 MRN [id number]   [facility] 4NW-A Attending Cristal Tate, [facility] Day # 1 PCP Alyx Avalos MD     Date of Admission: 2021  Date of [address]  medications      Instructions Prescription details   levofloxacin 750 MG Tabs  Commonly known as: LEVAQUIN      Take 1 tablet (750 mg total) by mouth every other day for 7 days.    Stop taking on: May 25, 2021  Quantity: 4 tablet  Refills: 0        CONTINUE day.   Quantity: 5 pen  Refills: 3     losartan 100 MG Tabs  Commonly known as: COZAAR      Take 1 tablet (100 mg total) by mouth daily.    Quantity: 90 tablet  Refills: 1     OneTouch Delica Lancets 88B Misc      Test once daily   Quantity: 100 each  Refil UP [16] 20 min Oncology - [address], Tenzin Hua MD    Patient Instructions: For the safety of our patients, visitors and care team, we are asking patients not to bring anyone with them to their appointment, unless clinically required.

## 2021-05-23 ENCOUNTER — PATIENT MESSAGE (OUTPATIENT)
Dept: FAMILY MEDICINE CLINIC | Facility: CLINIC | Age: 81
End: 2021-05-23

## 2021-05-24 NOTE — TELEPHONE ENCOUNTER
From: Pricilla Chairez  To: Amador Gifford MD  Sent: 5/23/2021 12:19 PM CDT  Subject: Non-Urgent Medical Question    Please see the attached pdf file. Thank you.

## 2021-05-28 NOTE — PAYOR COMM NOTE
--------------  DISCHARGE REVIEW    Payor: Sachin Oliva #:  [id number]  Authorization Number: [id number]    Admit date: 5/17/21  Admit time:  10:55 PM  Discharge Date: 5/18/2021  7:17 PM      Admitting Physician: Naman Martinez outpatient. Brief Synopsis:     The patient was admitted secondary to pneumonia. He was started on antibiotics with improvement in his symptoms. He was discharged home on a course of antibiotics.   Post discharge his blood cultures returned with gram p Transmitter Misc      Use one for 90 days. Quantity: 1 each  Refills: 3     docusate sodium 100 MG Caps  Commonly known as: COLACE      100 mg 2 (two) times daily.    Refills: 0     escitalopram 20 MG Tabs  Commonly known as: LEXAPRO      20 mg. 1 tablet MG Tabs         [place]    Follow-up appointment:   Ricco Macias [address] [facility] [address]  [phone]          Appointments for Next 30 Days 5/21/2021 - 6/20/2021 Comment      Date Arrival Time Visit Type Length

## 2021-06-01 RX ORDER — TRAZODONE HYDROCHLORIDE 50 MG/1
50 TABLET ORAL NIGHTLY PRN
Qty: 30 TABLET | Refills: 0 | Status: SHIPPED | OUTPATIENT
Start: 2021-06-01 | End: 2021-07-25

## 2021-06-01 NOTE — TELEPHONE ENCOUNTER
[facility] Visit date not found    LAST LAB    LAST RX 4-1-21 30*0    Next OV   Future Appointments   Date Time Provider James Perezi   6/9/2021  1:45 PM Marah Yang MD [address] [place] [address]   6/14/2021 11:40 AM Ellen Lockhart MD [address]

## 2021-06-07 ENCOUNTER — MED REC SCAN ONLY (OUTPATIENT)
Dept: FAMILY MEDICINE CLINIC | Facility: CLINIC | Age: 81
End: 2021-06-07

## 2021-06-09 PROBLEM — I10 ESSENTIAL HYPERTENSION: Status: ACTIVE | Noted: 2020-11-12

## 2021-06-28 RX ORDER — ESCITALOPRAM OXALATE 20 MG/1
TABLET ORAL
Qty: 30 TABLET | Refills: 3 | Status: SHIPPED | OUTPATIENT
Start: 2021-06-28 | End: 2021-09-25

## 2021-06-28 NOTE — TELEPHONE ENCOUNTER
LOV 12/2/2020    Future Appointments   Date Time Provider James Camila   7/26/2021 11:00 AM Gibson Snellen, MD [address]   12/10/2021  1:20 PM Nayla Morris, NP [facility]     escitalopram 20 MG Oral Tab 30 tablet 3 2/15/2021

## 2021-07-15 ENCOUNTER — MED REC SCAN ONLY (OUTPATIENT)
Dept: FAMILY MEDICINE CLINIC | Facility: CLINIC | Age: 81
End: 2021-07-15

## 2021-07-26 RX ORDER — FOLIC ACID 1 MG/1
1 TABLET ORAL DAILY
Qty: 90 TABLET | Refills: 1 | Status: SHIPPED | OUTPATIENT
Start: 2021-07-26 | End: 2022-01-16

## 2021-07-26 RX ORDER — TRAZODONE HYDROCHLORIDE 50 MG/1
50 TABLET ORAL NIGHTLY PRN
Qty: 30 TABLET | Refills: 0 | Status: SHIPPED | OUTPATIENT
Start: 2021-07-26 | End: 2021-10-08

## 2021-07-26 NOTE — TELEPHONE ENCOUNTER
LOV 12/2/2020    Future Appointments   Date Time Provider James Camila   8/23/2021 11:20 AM Sada Martin MD [address]   12/10/2021  1:20 PM Julio Barry NP [facility]     traZODone HCl 50 MG Oral Tab 30 tablet 0 6/1/2021

## 2021-07-26 NOTE — TELEPHONE ENCOUNTER
LOV     LAST LAB    LAST RX    Next OV     PROTOCOL   folic acid 1 MG Oral Tab         Sig: Take 1 tablet (1 mg total) by mouth daily.     Disp:  90 tablet    Refills:  1    Start: 7/25/2021    Class: Normal    Non-formulary    Last ordered: 3 months ago by

## 2021-08-05 ENCOUNTER — OFFICE VISIT (OUTPATIENT)
Dept: FAMILY MEDICINE CLINIC | Facility: CLINIC | Age: 81
End: 2021-08-05
Payer: MEDICAID

## 2021-08-05 VITALS
BODY MASS INDEX: 23.99 KG/M2 | WEIGHT: 162 LBS | HEART RATE: 64 BPM | OXYGEN SATURATION: 98 % | TEMPERATURE: 99 F | DIASTOLIC BLOOD PRESSURE: 48 MMHG | RESPIRATION RATE: 16 BRPM | HEIGHT: 69 IN | SYSTOLIC BLOOD PRESSURE: 110 MMHG

## 2021-08-05 DIAGNOSIS — R29.6 FREQUENT FALLS: Primary | ICD-10-CM

## 2021-08-05 DIAGNOSIS — R53.1 GENERALIZED WEAKNESS: ICD-10-CM

## 2021-08-05 DIAGNOSIS — G89.29 CHRONIC RIGHT-SIDED LOW BACK PAIN WITH RIGHT-SIDED SCIATICA: ICD-10-CM

## 2021-08-05 DIAGNOSIS — M54.31 SCIATICA OF RIGHT SIDE: ICD-10-CM

## 2021-08-05 DIAGNOSIS — M54.41 CHRONIC RIGHT-SIDED LOW BACK PAIN WITH RIGHT-SIDED SCIATICA: ICD-10-CM

## 2021-08-05 PROBLEM — A49.8 OTHER BACTERIAL INFECTIONS OF UNSPECIFIED SITE: Status: ACTIVE | Noted: 2021-05-21

## 2021-08-05 PROBLEM — J12.9 VIRAL PNEUMONIA, UNSPECIFIED: Status: ACTIVE | Noted: 2021-05-31

## 2021-08-05 PROCEDURE — 3008F BODY MASS INDEX DOCD: CPT | Performed by: FAMILY MEDICINE

## 2021-08-05 PROCEDURE — 3078F DIAST BP <80 MM HG: CPT | Performed by: FAMILY MEDICINE

## 2021-08-05 PROCEDURE — 3074F SYST BP LT 130 MM HG: CPT | Performed by: FAMILY MEDICINE

## 2021-08-05 PROCEDURE — 99214 OFFICE O/P EST MOD 30 MIN: CPT | Performed by: FAMILY MEDICINE

## 2021-08-05 RX ORDER — ACETAMINOPHEN AND CODEINE PHOSPHATE 300; 30 MG/1; MG/1
1 TABLET ORAL EVERY 6 HOURS PRN
Qty: 30 TABLET | Refills: 0 | Status: SHIPPED | OUTPATIENT
Start: 2021-08-05 | End: 2021-12-22 | Stop reason: ALTCHOICE

## 2021-08-06 NOTE — PROGRESS NOTES
/48   Pulse 64   Temp 98.7 °F (37.1 °C) (Temporal)   Resp 16   Ht 5' 9\" (1.753 m)   Wt 162 lb (73.5 kg)   SpO2 98%   BMI 23.92 kg/m²               Patient presents with:  Fall: 2 days ago  Back Pain       HPI;    Maddi Blas is a 79year old male. 0   • REVLIMID 5 MG Oral Cap TAKE 1 CAPSULE BY MOUTH EVERY DAY FOR 14 DAYS, THEN 14 DAYS OFF 14 capsule 0   • escitalopram 20 MG Oral Tab 20 mg. 1 tablet 30 tablet 3   • Glucose Blood (ONETOUCH VERIO) In Vitro Strip 1 each by Other route daily.  100 strip 1 Smoking status: Never Smoker      Smokeless tobacco: Never Used    Vaping Use      Vaping Use: Never used    Alcohol use: Never    Drug use: Never       REVIEW OF SYSTEMS:   GENERAL HEALTH: feels well otherwise  SKIN: denies any unusual skin lesions or marie this Visit:  Requested Prescriptions     Signed Prescriptions Disp Refills   • Acetaminophen-Codeine (TYLENOL WITH CODEINE #3) 300-30 MG Oral Tab 30 tablet 0     Sig: Take 1 tablet by mouth every 6 (six) hours as needed for Pain.      No orders of the defin

## 2021-08-16 ENCOUNTER — MED REC SCAN ONLY (OUTPATIENT)
Dept: FAMILY MEDICINE CLINIC | Facility: CLINIC | Age: 81
End: 2021-08-16

## 2021-09-13 ENCOUNTER — MED REC SCAN ONLY (OUTPATIENT)
Dept: FAMILY MEDICINE CLINIC | Facility: CLINIC | Age: 81
End: 2021-09-13

## 2021-09-27 ENCOUNTER — LAB ENCOUNTER (OUTPATIENT)
Dept: LAB | Age: 81
End: 2021-09-27
Attending: INTERNAL MEDICINE
Payer: MEDICAID

## 2021-09-27 DIAGNOSIS — Z99.2 ENCOUNTER FOR EXTRACORPOREAL DIALYSIS (HCC): ICD-10-CM

## 2021-09-27 DIAGNOSIS — I95.1 ORTHOSTATIC HYPOTENSION: Primary | ICD-10-CM

## 2021-09-27 DIAGNOSIS — I44.7 COMPLETE LEFT BUNDLE BRANCH BLOCK: ICD-10-CM

## 2021-09-27 PROCEDURE — 80053 COMPREHEN METABOLIC PANEL: CPT

## 2021-09-27 PROCEDURE — 80061 LIPID PANEL: CPT

## 2021-09-27 PROCEDURE — 36415 COLL VENOUS BLD VENIPUNCTURE: CPT

## 2021-09-27 RX ORDER — ESCITALOPRAM OXALATE 20 MG/1
TABLET ORAL
Qty: 30 TABLET | Refills: 3 | Status: SHIPPED | OUTPATIENT
Start: 2021-09-27 | End: 2022-02-04

## 2021-09-27 NOTE — TELEPHONE ENCOUNTER
LOV 8/5/2021    Future Appointments   Date Time Provider James Jensen   10/11/2021 11:20 AM Gibson Snellen, MD [facility]   12/10/2021  1:20 PM Nayla Morris, [facility]     escitalopram 20 MG Oral Tab 30 tablet 3 6/28/2021    Si

## 2021-10-05 ENCOUNTER — OFFICE VISIT (OUTPATIENT)
Dept: CARDIOLOGY | Age: 81
End: 2021-10-05

## 2021-10-05 VITALS
WEIGHT: 165 LBS | DIASTOLIC BLOOD PRESSURE: 71 MMHG | HEIGHT: 69 IN | BODY MASS INDEX: 24.44 KG/M2 | HEART RATE: 56 BPM | SYSTOLIC BLOOD PRESSURE: 133 MMHG

## 2021-10-05 DIAGNOSIS — I95.1 ORTHOSTATIC HYPOTENSION: Primary | ICD-10-CM

## 2021-10-05 DIAGNOSIS — R93.1 ELEVATED CORONARY ARTERY CALCIUM SCORE: ICD-10-CM

## 2021-10-05 DIAGNOSIS — I10 ESSENTIAL HYPERTENSION: ICD-10-CM

## 2021-10-05 DIAGNOSIS — I44.7 LBBB (LEFT BUNDLE BRANCH BLOCK): ICD-10-CM

## 2021-10-05 DIAGNOSIS — E78.00 HYPERCHOLESTEREMIA: ICD-10-CM

## 2021-10-05 PROCEDURE — 3078F DIAST BP <80 MM HG: CPT | Performed by: INTERNAL MEDICINE

## 2021-10-05 PROCEDURE — 99214 OFFICE O/P EST MOD 30 MIN: CPT | Performed by: INTERNAL MEDICINE

## 2021-10-05 PROCEDURE — 3075F SYST BP GE 130 - 139MM HG: CPT | Performed by: INTERNAL MEDICINE

## 2021-10-05 RX ORDER — ESCITALOPRAM OXALATE 20 MG/1
20 TABLET ORAL DAILY
COMMUNITY

## 2021-10-05 RX ORDER — TRAZODONE HYDROCHLORIDE 50 MG/1
50 TABLET ORAL
COMMUNITY
Start: 2021-07-26

## 2021-10-05 SDOH — HEALTH STABILITY: PHYSICAL HEALTH: ON AVERAGE, HOW MANY DAYS PER WEEK DO YOU ENGAGE IN MODERATE TO STRENUOUS EXERCISE (LIKE A BRISK WALK)?: 0 DAYS

## 2021-10-05 SDOH — HEALTH STABILITY: PHYSICAL HEALTH: ON AVERAGE, HOW MANY MINUTES DO YOU ENGAGE IN EXERCISE AT THIS LEVEL?: 0 MIN

## 2021-10-05 ASSESSMENT — PATIENT HEALTH QUESTIONNAIRE - PHQ9
1. LITTLE INTEREST OR PLEASURE IN DOING THINGS: NOT AT ALL
CLINICAL INTERPRETATION OF PHQ2 SCORE: NO FURTHER SCREENING NEEDED
SUM OF ALL RESPONSES TO PHQ9 QUESTIONS 1 AND 2: 0
CLINICAL INTERPRETATION OF PHQ9 SCORE: NO FURTHER SCREENING NEEDED
SUM OF ALL RESPONSES TO PHQ9 QUESTIONS 1 AND 2: 0
2. FEELING DOWN, DEPRESSED OR HOPELESS: NOT AT ALL

## 2021-10-09 RX ORDER — TRAZODONE HYDROCHLORIDE 50 MG/1
50 TABLET ORAL NIGHTLY PRN
Qty: 30 TABLET | Refills: 0 | Status: SHIPPED | OUTPATIENT
Start: 2021-10-09 | End: 2021-12-10 | Stop reason: ALTCHOICE

## 2021-10-09 NOTE — TELEPHONE ENCOUNTER
traZODone HCl 50 MG Oral Tab 30 tablet 0 7/26/2021    Sig:   Take 1 tablet (50 mg total) by mouth nightly as needed for Sleep.        LOV 8/5/2021    Future Appointments   Date Time Provider James Jensen   10/11/2021 11:20 AM MD XIN Sandhu ON

## 2021-10-25 ENCOUNTER — TELEMEDICINE (OUTPATIENT)
Dept: FAMILY MEDICINE CLINIC | Facility: CLINIC | Age: 81
End: 2021-10-25

## 2021-10-25 DIAGNOSIS — G25.81 RLS (RESTLESS LEGS SYNDROME): Primary | ICD-10-CM

## 2021-10-25 PROBLEM — J12.9 VIRAL PNEUMONIA, UNSPECIFIED: Status: RESOLVED | Noted: 2021-05-31 | Resolved: 2021-10-25

## 2021-10-25 PROBLEM — J18.9 COMMUNITY ACQUIRED PNEUMONIA: Status: RESOLVED | Noted: 2021-05-17 | Resolved: 2021-10-25

## 2021-10-25 PROBLEM — J18.9 COMMUNITY ACQUIRED PNEUMONIA, UNSPECIFIED LATERALITY: Status: RESOLVED | Noted: 2021-05-17 | Resolved: 2021-10-25

## 2021-10-25 PROBLEM — A49.8 OTHER BACTERIAL INFECTIONS OF UNSPECIFIED SITE: Status: RESOLVED | Noted: 2021-05-21 | Resolved: 2021-10-25

## 2021-10-25 PROCEDURE — 99214 OFFICE O/P EST MOD 30 MIN: CPT | Performed by: FAMILY MEDICINE

## 2021-10-25 RX ORDER — ROPINIROLE 0.5 MG/1
0.5 TABLET, FILM COATED ORAL NIGHTLY
Qty: 30 TABLET | Refills: 2 | Status: SHIPPED | OUTPATIENT
Start: 2021-10-25 | End: 2021-10-25

## 2021-10-25 RX ORDER — LOSARTAN POTASSIUM 100 MG/1
100 TABLET ORAL DAILY
Qty: 90 TABLET | Refills: 1 | Status: SHIPPED | OUTPATIENT
Start: 2021-10-25

## 2021-10-25 RX ORDER — MIDODRINE HYDROCHLORIDE 5 MG/1
TABLET ORAL
COMMUNITY
Start: 2021-10-23

## 2021-10-25 RX ORDER — ROPINIROLE 0.5 MG/1
0.5 TABLET, FILM COATED ORAL NIGHTLY
Qty: 30 TABLET | Refills: 2 | Status: SHIPPED | OUTPATIENT
Start: 2021-10-25 | End: 2022-01-16

## 2021-10-25 RX ORDER — AMLODIPINE BESYLATE 10 MG/1
10 TABLET ORAL DAILY
Qty: 90 TABLET | Refills: 1 | Status: SHIPPED | OUTPATIENT
Start: 2021-10-25

## 2021-10-25 NOTE — TELEPHONE ENCOUNTER
LOV 12/2/2020    LAST LAB 10/11/2021    LAST RX   losartan 100 MG Oral Tab 90 tablet 1 4/21/2021     amLODIPine Besylate 10 MG Oral Tab 90 tablet 1 4/21/2021           Next OV   Future Appointments   Date Time Provider James Jensen   10/25/2021  3:00

## 2021-10-25 NOTE — PROGRESS NOTES
Please note that the following visit was completed using two-way, real-time interactive audio and video communication.   This has been done in good kelly to provide continuity of care in the best interest of the provider-patient relationship, due to the o MG Oral Tab 20 mg. 1 tablet 30 tablet 3   • Methoxy PEG-Epoetin Beta (MIRCERA IJ) 150 mcg. • Acetaminophen-Codeine (TYLENOL WITH CODEINE #3) 300-30 MG Oral Tab Take 1 tablet by mouth every 6 (six) hours as needed for Pain.  30 tablet 0   • Insulin Lispr neck pain, running nose, headaches or swollen glands. Skin: No rashes, pruritus or skin changes,  Respiratory: Denies cough, wheezing or shortness of breath. CV: Denies chest pain, palpitations, orthopnea, PND or dizziness.   Musculoskeletal: No joint cuba

## 2021-11-08 ENCOUNTER — MED REC SCAN ONLY (OUTPATIENT)
Dept: FAMILY MEDICINE CLINIC | Facility: CLINIC | Age: 81
End: 2021-11-08

## 2021-11-20 ENCOUNTER — PATIENT MESSAGE (OUTPATIENT)
Dept: FAMILY MEDICINE CLINIC | Facility: CLINIC | Age: 81
End: 2021-11-20

## 2021-11-20 DIAGNOSIS — G89.29 CHRONIC RIGHT-SIDED LOW BACK PAIN WITH RIGHT-SIDED SCIATICA: Primary | ICD-10-CM

## 2021-11-20 DIAGNOSIS — M54.41 CHRONIC RIGHT-SIDED LOW BACK PAIN WITH RIGHT-SIDED SCIATICA: Primary | ICD-10-CM

## 2021-11-22 RX ORDER — GABAPENTIN 300 MG/1
300 CAPSULE ORAL 3 TIMES DAILY
Qty: 270 CAPSULE | Refills: 1 | Status: SHIPPED | OUTPATIENT
Start: 2021-11-22

## 2021-11-22 NOTE — TELEPHONE ENCOUNTER
From: Denys Phalen  To: Mike Armstrong MD  Sent: 11/20/2021 11:15 AM CST  Subject: gabapentin    Hello, my dad is in need of his gabapentin 300 mg 1 cap TID. He has used up his 600 mg tab by cutting in half.   Also I wanted to inform you that Requ[employer] is workin

## 2021-11-24 ENCOUNTER — EXTERNAL RECORD (OUTPATIENT)
Dept: HEALTH INFORMATION MANAGEMENT | Facility: OTHER | Age: 81
End: 2021-11-24

## 2021-12-02 ENCOUNTER — TELEPHONE (OUTPATIENT)
Dept: FAMILY MEDICINE CLINIC | Facility: CLINIC | Age: 81
End: 2021-12-02

## 2021-12-02 ENCOUNTER — MED REC SCAN ONLY (OUTPATIENT)
Dept: FAMILY MEDICINE CLINIC | Facility: CLINIC | Age: 81
End: 2021-12-02

## 2021-12-21 RX ORDER — ROSUVASTATIN CALCIUM 10 MG/1
10 TABLET, COATED ORAL DAILY
Qty: 90 TABLET | Refills: 3 | Status: SHIPPED | OUTPATIENT
Start: 2021-12-21

## 2021-12-22 ENCOUNTER — OFFICE VISIT (OUTPATIENT)
Dept: FAMILY MEDICINE CLINIC | Facility: CLINIC | Age: 81
End: 2021-12-22
Payer: MEDICAID

## 2021-12-22 VITALS
BODY MASS INDEX: 25.48 KG/M2 | WEIGHT: 162.38 LBS | SYSTOLIC BLOOD PRESSURE: 146 MMHG | OXYGEN SATURATION: 97 % | TEMPERATURE: 97 F | HEIGHT: 67 IN | HEART RATE: 64 BPM | RESPIRATION RATE: 16 BRPM | DIASTOLIC BLOOD PRESSURE: 70 MMHG

## 2021-12-22 DIAGNOSIS — E11.22 TYPE 2 DIABETES MELLITUS WITH CHRONIC KIDNEY DISEASE ON CHRONIC DIALYSIS, WITH LONG-TERM CURRENT USE OF INSULIN (HCC): ICD-10-CM

## 2021-12-22 DIAGNOSIS — Z79.4 TYPE 2 DIABETES MELLITUS WITH CHRONIC KIDNEY DISEASE ON CHRONIC DIALYSIS, WITH LONG-TERM CURRENT USE OF INSULIN (HCC): ICD-10-CM

## 2021-12-22 DIAGNOSIS — Z00.00 ENCOUNTER FOR ANNUAL PHYSICAL EXAM: Primary | ICD-10-CM

## 2021-12-22 DIAGNOSIS — Z99.2 TYPE 2 DIABETES MELLITUS WITH CHRONIC KIDNEY DISEASE ON CHRONIC DIALYSIS, WITH LONG-TERM CURRENT USE OF INSULIN (HCC): ICD-10-CM

## 2021-12-22 DIAGNOSIS — N18.6 TYPE 2 DIABETES MELLITUS WITH CHRONIC KIDNEY DISEASE ON CHRONIC DIALYSIS, WITH LONG-TERM CURRENT USE OF INSULIN (HCC): ICD-10-CM

## 2021-12-22 DIAGNOSIS — I15.2 HYPERTENSION ASSOCIATED WITH DIABETES (HCC): ICD-10-CM

## 2021-12-22 DIAGNOSIS — E11.59 HYPERTENSION ASSOCIATED WITH DIABETES (HCC): ICD-10-CM

## 2021-12-22 PROBLEM — T78.2XXA ANAPHYLACTIC SHOCK, UNSPECIFIED, INITIAL ENCOUNTER: Status: ACTIVE | Noted: 2021-12-11

## 2021-12-22 PROBLEM — F33.0 MAJOR DEPRESSIVE DISORDER, RECURRENT, MILD (HCC): Status: ACTIVE | Noted: 2021-12-22

## 2021-12-22 PROBLEM — T78.40XA ALLERGY, UNSPECIFIED, INITIAL ENCOUNTER: Status: ACTIVE | Noted: 2021-12-11

## 2021-12-22 PROBLEM — F33.0 MAJOR DEPRESSIVE DISORDER, RECURRENT, MILD: Status: ACTIVE | Noted: 2021-12-22

## 2021-12-22 PROCEDURE — 3078F DIAST BP <80 MM HG: CPT | Performed by: FAMILY MEDICINE

## 2021-12-22 PROCEDURE — 99397 PER PM REEVAL EST PAT 65+ YR: CPT | Performed by: FAMILY MEDICINE

## 2021-12-22 PROCEDURE — 3008F BODY MASS INDEX DOCD: CPT | Performed by: FAMILY MEDICINE

## 2021-12-22 PROCEDURE — 3077F SYST BP >= 140 MM HG: CPT | Performed by: FAMILY MEDICINE

## 2021-12-22 RX ORDER — PREDNISOLONE ACETATE 10 MG/ML
SUSPENSION/ DROPS OPHTHALMIC
COMMUNITY
Start: 2021-12-20

## 2021-12-22 RX ORDER — ROSUVASTATIN CALCIUM 10 MG/1
10 TABLET, COATED ORAL NIGHTLY
Qty: 90 TABLET | Refills: 1 | Status: SHIPPED | OUTPATIENT
Start: 2021-12-22

## 2021-12-22 RX ORDER — TRAZODONE HYDROCHLORIDE 50 MG/1
50 TABLET ORAL NIGHTLY
Qty: 30 TABLET | Refills: 0 | Status: SHIPPED | OUTPATIENT
Start: 2021-12-22 | End: 2022-01-23

## 2021-12-22 NOTE — PROGRESS NOTES
/70   Pulse 64   Temp 97.4 °F (36.3 °C) (Temporal)   Resp 16   Ht 5' 7\" (1.702 m)   Wt 162 lb 6 oz (73.7 kg)   SpO2 97%   BMI 25.43 kg/m²  Body mass index is 25.43 kg/m².      Patient presents with:  Physical      Ector Law is a [de-identified]year old male VERIO) In Vitro Strip 1 each by Other route daily. 100 strip 1   • gabapentin 300 MG Oral Cap Take 1 capsule (300 mg total) by mouth 3 (three) times daily. 270 capsule 1   • losartan 100 MG Oral Tab Take 1 tablet (100 mg total) by mouth daily.  90 tablet 1 Heart Disorder Mother       Social History:  Social History    Socioeconomic History      Marital status:      Tobacco Use      Smoking status: Never Smoker      Smokeless tobacco: Never Used    Vaping Use      Vaping Use: Never used    Substance and back  EXTREMITIES: no cyanosis, clubbing or edema  NEURO: Oriented times three,cranial nerves are intact,motor and sensory are grossly intact    ASSESSMENT AND PLAN:   :Jensen Dillon was seen today for physical.    Diagnoses and all orders for this visit:    Kelsy benefits on overall health. Recommend at least 30 minutes a day 3-4 times a week of aerobic activity such as brisk walking, cycling, aerobics, or swimming.   Anerobic activities also encouraged for overall toning and strength/endurance building  Pt info g

## 2022-01-03 ENCOUNTER — OFF PREMISE (OUTPATIENT)
Dept: CARDIOLOGY | Age: 82
End: 2022-01-03

## 2022-01-04 ENCOUNTER — MED REC SCAN ONLY (OUTPATIENT)
Dept: FAMILY MEDICINE CLINIC | Facility: CLINIC | Age: 82
End: 2022-01-04

## 2022-01-16 DIAGNOSIS — G25.81 RLS (RESTLESS LEGS SYNDROME): ICD-10-CM

## 2022-01-17 ENCOUNTER — TELEPHONE (OUTPATIENT)
Dept: FAMILY MEDICINE CLINIC | Facility: CLINIC | Age: 82
End: 2022-01-17

## 2022-01-17 RX ORDER — FOLIC ACID 1 MG/1
1 TABLET ORAL DAILY
Qty: 90 TABLET | Refills: 1 | Status: SHIPPED | OUTPATIENT
Start: 2022-01-17

## 2022-01-17 RX ORDER — ROPINIROLE 0.5 MG/1
0.5 TABLET, FILM COATED ORAL NIGHTLY
Qty: 30 TABLET | Refills: 2 | Status: SHIPPED | OUTPATIENT
Start: 2022-01-17

## 2022-01-17 NOTE — TELEPHONE ENCOUNTER
rOPINIRole HCl (REQUIP) 0.5 MG Oral Tab 30 tablet 2 10/25/2021    Sig:   Take 1 tablet (0.5 mg total) by mouth at bedtime.        folic acid 1 MG Oral Tab 90 tablet 1 7/26/2021     lov 12/22/2021  Future Appointments   Date Time Provider James Jensen

## 2022-01-21 PROBLEM — G62.89 OTHER POLYNEUROPATHY: Status: ACTIVE | Noted: 2022-01-21

## 2022-01-21 PROBLEM — Z79.899: Status: ACTIVE | Noted: 2022-01-21

## 2022-01-21 PROBLEM — Z79.61: Status: ACTIVE | Noted: 2022-01-21

## 2022-01-21 PROBLEM — D64.9 NORMOCHROMIC ANEMIA: Status: ACTIVE | Noted: 2022-01-21

## 2022-01-24 RX ORDER — TRAZODONE HYDROCHLORIDE 50 MG/1
50 TABLET ORAL NIGHTLY
Qty: 30 TABLET | Refills: 0 | Status: SHIPPED | OUTPATIENT
Start: 2022-01-24

## 2022-01-24 NOTE — TELEPHONE ENCOUNTER
traZODone 50 MG Oral Tab 30 tablet 0 12/22/2021    Sig:   Take 1 tablet (50 mg total) by mouth nightly.        LOV 12/22/2021    Future Appointments   Date Time Provider James Jensen   6/1/2022  2:45 PM Yusra Haddad MD [address] [place] [address]

## 2022-01-31 ENCOUNTER — EXTERNAL RECORD (OUTPATIENT)
Dept: HEALTH INFORMATION MANAGEMENT | Facility: OTHER | Age: 82
End: 2022-01-31

## 2022-02-04 RX ORDER — ESCITALOPRAM OXALATE 20 MG/1
TABLET ORAL
Qty: 30 TABLET | Refills: 3 | Status: SHIPPED | OUTPATIENT
Start: 2022-02-04

## 2022-02-04 RX ORDER — TRAZODONE HYDROCHLORIDE 50 MG/1
50 TABLET ORAL NIGHTLY
Qty: 30 TABLET | Refills: 0 | OUTPATIENT
Start: 2022-02-04

## 2022-02-04 NOTE — TELEPHONE ENCOUNTER
Rose Spence 12/22/2021    Future Appointments   Date Time Provider James Camila   2/23/2022 11:20 AM Marisa Diaz MD [facility]   6/1/2022  2:45 PM Lizabeth Martini MD [facility]       escitalopram 20 MG Oral Tab 30 tablet 3 9/27/2021

## 2022-02-21 RX ORDER — TRAZODONE HYDROCHLORIDE 50 MG/1
50 TABLET ORAL NIGHTLY
Qty: 30 TABLET | Refills: 0 | Status: SHIPPED | OUTPATIENT
Start: 2022-02-21 | End: 2022-03-24

## 2022-03-29 RX ORDER — TRAZODONE HYDROCHLORIDE 50 MG/1
50 TABLET ORAL NIGHTLY
Qty: 30 TABLET | Refills: 0 | Status: SHIPPED | OUTPATIENT
Start: 2022-03-29

## 2022-04-19 ENCOUNTER — APPOINTMENT (OUTPATIENT)
Dept: GENERAL RADIOLOGY | Age: 82
End: 2022-04-19
Attending: EMERGENCY MEDICINE
Payer: MEDICAID

## 2022-04-19 ENCOUNTER — HOSPITAL ENCOUNTER (OUTPATIENT)
Age: 82
Discharge: HOME | End: 2022-04-19
Attending: EMERGENCY MEDICINE
Payer: MEDICAID

## 2022-04-19 VITALS
DIASTOLIC BLOOD PRESSURE: 59 MMHG | BODY MASS INDEX: 26.07 KG/M2 | OXYGEN SATURATION: 97 % | WEIGHT: 176 LBS | RESPIRATION RATE: 18 BRPM | HEIGHT: 69 IN | TEMPERATURE: 98 F | SYSTOLIC BLOOD PRESSURE: 149 MMHG | HEART RATE: 67 BPM

## 2022-04-19 DIAGNOSIS — S70.10XA CONTUSION OF THIGH, UNSPECIFIED LATERALITY, INITIAL ENCOUNTER: Primary | ICD-10-CM

## 2022-04-19 DIAGNOSIS — S30.0XXA LUMBAR CONTUSION, INITIAL ENCOUNTER: ICD-10-CM

## 2022-04-19 PROCEDURE — 72110 X-RAY EXAM L-2 SPINE 4/>VWS: CPT | Performed by: EMERGENCY MEDICINE

## 2022-04-19 PROCEDURE — 99214 OFFICE O/P EST MOD 30 MIN: CPT

## 2022-04-19 PROCEDURE — 73552 X-RAY EXAM OF FEMUR 2/>: CPT | Performed by: EMERGENCY MEDICINE

## 2022-04-19 NOTE — ED INITIAL ASSESSMENT (HPI)
Pt sts that he was up in bathroom when he just fell. Co low back pain and bilateral upper leg pain. No bruising noted. Pt sts that he is chronically weak. Uses cane and walker. Had dialysis PTA.  (dialysis on tuesday, Thursday and Saturday).

## 2022-04-21 RX ORDER — LOSARTAN POTASSIUM 100 MG/1
100 TABLET ORAL DAILY
Qty: 90 TABLET | Refills: 1 | Status: SHIPPED | OUTPATIENT
Start: 2022-04-21

## 2022-04-21 RX ORDER — AMLODIPINE BESYLATE 10 MG/1
10 TABLET ORAL DAILY
Qty: 90 TABLET | Refills: 1 | Status: SHIPPED | OUTPATIENT
Start: 2022-04-21

## 2022-05-04 ENCOUNTER — OFFICE VISIT (OUTPATIENT)
Dept: FAMILY MEDICINE CLINIC | Facility: CLINIC | Age: 82
End: 2022-05-04
Payer: MEDICAID

## 2022-05-04 ENCOUNTER — PATIENT MESSAGE (OUTPATIENT)
Dept: FAMILY MEDICINE CLINIC | Facility: CLINIC | Age: 82
End: 2022-05-04

## 2022-05-04 VITALS
BODY MASS INDEX: 25.27 KG/M2 | OXYGEN SATURATION: 97 % | HEART RATE: 66 BPM | RESPIRATION RATE: 16 BRPM | WEIGHT: 170.63 LBS | DIASTOLIC BLOOD PRESSURE: 62 MMHG | HEIGHT: 69 IN | SYSTOLIC BLOOD PRESSURE: 140 MMHG | TEMPERATURE: 99 F

## 2022-05-04 DIAGNOSIS — Z79.4 TYPE 2 DIABETES MELLITUS WITH CHRONIC KIDNEY DISEASE ON CHRONIC DIALYSIS, WITH LONG-TERM CURRENT USE OF INSULIN (HCC): ICD-10-CM

## 2022-05-04 DIAGNOSIS — Z99.2 TYPE 2 DIABETES MELLITUS WITH CHRONIC KIDNEY DISEASE ON CHRONIC DIALYSIS, WITH LONG-TERM CURRENT USE OF INSULIN (HCC): ICD-10-CM

## 2022-05-04 DIAGNOSIS — N18.6 TYPE 2 DIABETES MELLITUS WITH CHRONIC KIDNEY DISEASE ON CHRONIC DIALYSIS, WITH LONG-TERM CURRENT USE OF INSULIN (HCC): ICD-10-CM

## 2022-05-04 DIAGNOSIS — N18.6 CKD (CHRONIC KIDNEY DISEASE) REQUIRING CHRONIC DIALYSIS (HCC): ICD-10-CM

## 2022-05-04 DIAGNOSIS — Z20.822 SUSPECTED COVID-19 VIRUS INFECTION: Primary | ICD-10-CM

## 2022-05-04 DIAGNOSIS — E11.22 TYPE 2 DIABETES MELLITUS WITH CHRONIC KIDNEY DISEASE ON CHRONIC DIALYSIS, WITH LONG-TERM CURRENT USE OF INSULIN (HCC): ICD-10-CM

## 2022-05-04 DIAGNOSIS — Z99.2 CKD (CHRONIC KIDNEY DISEASE) REQUIRING CHRONIC DIALYSIS (HCC): ICD-10-CM

## 2022-05-04 DIAGNOSIS — C90.00 MULTIPLE MYELOMA, REMISSION STATUS UNSPECIFIED (HCC): ICD-10-CM

## 2022-05-04 PROCEDURE — 3077F SYST BP >= 140 MM HG: CPT | Performed by: NURSE PRACTITIONER

## 2022-05-04 PROCEDURE — 3078F DIAST BP <80 MM HG: CPT | Performed by: NURSE PRACTITIONER

## 2022-05-04 PROCEDURE — 99213 OFFICE O/P EST LOW 20 MIN: CPT | Performed by: NURSE PRACTITIONER

## 2022-05-04 PROCEDURE — 3008F BODY MASS INDEX DOCD: CPT | Performed by: NURSE PRACTITIONER

## 2022-05-04 RX ORDER — TRAZODONE HYDROCHLORIDE 50 MG/1
50 TABLET ORAL NIGHTLY
Qty: 30 TABLET | Refills: 0 | Status: SHIPPED | OUTPATIENT
Start: 2022-05-04

## 2022-05-04 RX ORDER — ALBUTEROL SULFATE 90 UG/1
2 AEROSOL, METERED RESPIRATORY (INHALATION) EVERY 6 HOURS PRN
Qty: 1 EACH | Refills: 0 | Status: SHIPPED | OUTPATIENT
Start: 2022-05-04

## 2022-05-04 NOTE — TELEPHONE ENCOUNTER
From: Amanda Romo  To: Brenden Burns MD  Sent: 5/4/2022 10:06 AM CDT  Subject: Covid    My dad was exposed to my sister who had covid. My son is having the same symptoms. My dad has a sore throat and had headaches all night. This all happened over the weekend. At home covid tests are not accurate. I did it on my sister, it was neg, but pcr came out positive. Should i bring my alon in? Or take him to immediate care for pcr?

## 2022-05-05 ENCOUNTER — APPOINTMENT (OUTPATIENT)
Dept: GENERAL RADIOLOGY | Facility: HOSPITAL | Age: 82
End: 2022-05-05
Attending: INTERNAL MEDICINE
Payer: MEDICAID

## 2022-05-05 ENCOUNTER — APPOINTMENT (OUTPATIENT)
Dept: CT IMAGING | Facility: HOSPITAL | Age: 82
End: 2022-05-05
Attending: EMERGENCY MEDICINE
Payer: MEDICAID

## 2022-05-05 ENCOUNTER — PATIENT MESSAGE (OUTPATIENT)
Dept: FAMILY MEDICINE CLINIC | Facility: CLINIC | Age: 82
End: 2022-05-05

## 2022-05-05 ENCOUNTER — HOSPITAL ENCOUNTER (OUTPATIENT)
Facility: HOSPITAL | Age: 82
Setting detail: OBSERVATION
Discharge: HOME | End: 2022-05-06
Attending: EMERGENCY MEDICINE | Admitting: INTERNAL MEDICINE
Payer: MEDICAID

## 2022-05-05 DIAGNOSIS — U07.1 COVID-19: Primary | ICD-10-CM

## 2022-05-05 DIAGNOSIS — E87.5 HYPERKALEMIA: ICD-10-CM

## 2022-05-05 LAB
ALBUMIN SERPL-MCNC: 3.3 G/DL (ref 3.4–5)
ALBUMIN/GLOB SERPL: 0.9 {RATIO} (ref 1–2)
ALP LIVER SERPL-CCNC: 80 U/L
ALT SERPL-CCNC: 19 U/L
ANION GAP SERPL CALC-SCNC: 10 MMOL/L (ref 0–18)
AST SERPL-CCNC: 29 U/L (ref 15–37)
ATRIAL RATE: 62 BPM
BASE EXCESS BLDV CALC-SCNC: -1.9 MMOL/L
BASOPHILS # BLD AUTO: 0.02 X10(3) UL (ref 0–0.2)
BASOPHILS NFR BLD AUTO: 0.5 %
BILIRUB SERPL-MCNC: 0.5 MG/DL (ref 0.1–2)
BUN BLD-MCNC: 93 MG/DL (ref 7–18)
CA-I BLD-SCNC: 1.07 MMOL/L (ref 0.95–1.32)
CALCIUM BLD-MCNC: 8.9 MG/DL (ref 8.5–10.1)
CHLORIDE SERPL-SCNC: 102 MMOL/L (ref 98–112)
CO2 SERPL-SCNC: 21 MMOL/L (ref 21–32)
COHGB MFR BLD: 3.3 % SAT (ref 0–3)
CREAT BLD-MCNC: 9.98 MG/DL
CRP SERPL-MCNC: 1.18 MG/DL (ref ?–0.3)
D DIMER PPP FEU-MCNC: 1.29 UG/ML FEU (ref ?–0.8)
EOSINOPHIL # BLD AUTO: 0.05 X10(3) UL (ref 0–0.7)
EOSINOPHIL NFR BLD AUTO: 1.1 %
ERYTHROCYTE [DISTWIDTH] IN BLOOD BY AUTOMATED COUNT: 14.6 %
EST. AVERAGE GLUCOSE BLD GHB EST-MCNC: 97 MG/DL (ref 68–126)
GLOBULIN PLAS-MCNC: 3.7 G/DL (ref 2.8–4.4)
GLUCOSE BLD-MCNC: 106 MG/DL (ref 70–99)
GLUCOSE BLD-MCNC: 114 MG/DL (ref 70–99)
GLUCOSE BLD-MCNC: 116 MG/DL (ref 70–99)
GLUCOSE BLD-MCNC: 146 MG/DL (ref 70–99)
HBA1C MFR BLD: 5 % (ref ?–5.7)
HCO3 BLDV-SCNC: 23.2 MEQ/L (ref 22–26)
HCT VFR BLD AUTO: 30.1 %
HGB BLD-MCNC: 10.2 G/DL
HGB BLD-MCNC: 9.8 G/DL
IMM GRANULOCYTES # BLD AUTO: 0.02 X10(3) UL (ref 0–1)
IMM GRANULOCYTES NFR BLD: 0.5 %
LYMPHOCYTES # BLD AUTO: 1.29 X10(3) UL (ref 1–4)
LYMPHOCYTES NFR BLD AUTO: 29.1 %
MCH RBC QN AUTO: 33.9 PG (ref 26–34)
MCHC RBC AUTO-ENTMCNC: 33.9 G/DL (ref 31–37)
MCV RBC AUTO: 100 FL
METHGB MFR BLD: 0.7 % SAT (ref 0.4–1.5)
MONOCYTES # BLD AUTO: 0.74 X10(3) UL (ref 0.1–1)
MONOCYTES NFR BLD AUTO: 16.7 %
NEUTROPHILS # BLD AUTO: 2.32 X10 (3) UL (ref 1.5–7.7)
NEUTROPHILS # BLD AUTO: 2.32 X10(3) UL (ref 1.5–7.7)
NEUTROPHILS NFR BLD AUTO: 52.1 %
NT-PROBNP SERPL-MCNC: 1845 PG/ML (ref ?–450)
OSMOLALITY SERPL CALC.SUM OF ELEC: 307 MOSM/KG (ref 275–295)
OXYHGB MFR BLDV: 89.7 % (ref 72–78)
OXYHGB MFR BLDV: 90 % (ref 72–78)
P AXIS: 24 DEGREES
P-R INTERVAL: 200 MS
PCO2 BLDV: 35 MM HG (ref 38–50)
PH BLDV: 7.41 [PH] (ref 7.33–7.43)
PLATELET # BLD AUTO: 76 10(3)UL (ref 150–450)
PO2 BLDV: 65 MM HG (ref 30–50)
POTASSIUM BLD-SCNC: 5.6 MMOL/L (ref 3.6–5.1)
POTASSIUM SERPL-SCNC: 5.7 MMOL/L (ref 3.5–5.1)
PROCALCITONIN SERPL-MCNC: 0.33 NG/ML (ref ?–0.16)
PROT SERPL-MCNC: 7 G/DL (ref 6.4–8.2)
Q-T INTERVAL: 462 MS
QRS DURATION: 150 MS
QTC CALCULATION (BEZET): 468 MS
R AXIS: -27 DEGREES
RBC # BLD AUTO: 3.01 X10(6)UL
SARS-COV-2 RNA RESP QL NAA+PROBE: DETECTED
SODIUM BLD-SCNC: 132 MMOL/L (ref 135–145)
SODIUM SERPL-SCNC: 133 MMOL/L (ref 136–145)
T AXIS: 102 DEGREES
TROPONIN I HIGH SENSITIVITY: 26 NG/L
VENTRICULAR RATE: 62 BPM
WBC # BLD AUTO: 4.4 X10(3) UL (ref 4–11)

## 2022-05-05 PROCEDURE — 93010 ELECTROCARDIOGRAM REPORT: CPT

## 2022-05-05 PROCEDURE — 85379 FIBRIN DEGRADATION QUANT: CPT | Performed by: EMERGENCY MEDICINE

## 2022-05-05 PROCEDURE — 87340 HEPATITIS B SURFACE AG IA: CPT | Performed by: INTERNAL MEDICINE

## 2022-05-05 PROCEDURE — 83050 HGB METHEMOGLOBIN QUAN: CPT | Performed by: EMERGENCY MEDICINE

## 2022-05-05 PROCEDURE — 82330 ASSAY OF CALCIUM: CPT | Performed by: EMERGENCY MEDICINE

## 2022-05-05 PROCEDURE — 82375 ASSAY CARBOXYHB QUANT: CPT | Performed by: EMERGENCY MEDICINE

## 2022-05-05 PROCEDURE — 86140 C-REACTIVE PROTEIN: CPT | Performed by: EMERGENCY MEDICINE

## 2022-05-05 PROCEDURE — 84132 ASSAY OF SERUM POTASSIUM: CPT | Performed by: EMERGENCY MEDICINE

## 2022-05-05 PROCEDURE — 84145 PROCALCITONIN (PCT): CPT | Performed by: INTERNAL MEDICINE

## 2022-05-05 PROCEDURE — 90935 HEMODIALYSIS ONE EVALUATION: CPT | Performed by: INTERNAL MEDICINE

## 2022-05-05 PROCEDURE — 82803 BLOOD GASES ANY COMBINATION: CPT | Performed by: EMERGENCY MEDICINE

## 2022-05-05 PROCEDURE — 86704 HEP B CORE ANTIBODY TOTAL: CPT | Performed by: INTERNAL MEDICINE

## 2022-05-05 PROCEDURE — 83036 HEMOGLOBIN GLYCOSYLATED A1C: CPT | Performed by: INTERNAL MEDICINE

## 2022-05-05 PROCEDURE — 86706 HEP B SURFACE ANTIBODY: CPT | Performed by: INTERNAL MEDICINE

## 2022-05-05 PROCEDURE — 82962 GLUCOSE BLOOD TEST: CPT

## 2022-05-05 PROCEDURE — 99285 EMERGENCY DEPT VISIT HI MDM: CPT

## 2022-05-05 PROCEDURE — 71275 CT ANGIOGRAPHY CHEST: CPT | Performed by: EMERGENCY MEDICINE

## 2022-05-05 PROCEDURE — 93005 ELECTROCARDIOGRAM TRACING: CPT

## 2022-05-05 PROCEDURE — 71045 X-RAY EXAM CHEST 1 VIEW: CPT | Performed by: INTERNAL MEDICINE

## 2022-05-05 PROCEDURE — 85025 COMPLETE CBC W/AUTO DIFF WBC: CPT | Performed by: EMERGENCY MEDICINE

## 2022-05-05 PROCEDURE — 83880 ASSAY OF NATRIURETIC PEPTIDE: CPT | Performed by: EMERGENCY MEDICINE

## 2022-05-05 PROCEDURE — 80053 COMPREHEN METABOLIC PANEL: CPT | Performed by: EMERGENCY MEDICINE

## 2022-05-05 PROCEDURE — 84295 ASSAY OF SERUM SODIUM: CPT | Performed by: EMERGENCY MEDICINE

## 2022-05-05 PROCEDURE — 85018 HEMOGLOBIN: CPT | Performed by: EMERGENCY MEDICINE

## 2022-05-05 PROCEDURE — 84484 ASSAY OF TROPONIN QUANT: CPT | Performed by: EMERGENCY MEDICINE

## 2022-05-05 RX ORDER — ROSUVASTATIN CALCIUM 10 MG/1
10 TABLET, COATED ORAL NIGHTLY
Status: DISCONTINUED | OUTPATIENT
Start: 2022-05-05 | End: 2022-05-06

## 2022-05-05 RX ORDER — GABAPENTIN 300 MG/1
300 CAPSULE ORAL 3 TIMES DAILY
Status: DISCONTINUED | OUTPATIENT
Start: 2022-05-05 | End: 2022-05-06

## 2022-05-05 RX ORDER — ALBUTEROL SULFATE 90 UG/1
4 AEROSOL, METERED RESPIRATORY (INHALATION) EVERY 4 HOURS PRN
Status: DISCONTINUED | OUTPATIENT
Start: 2022-05-05 | End: 2022-05-06

## 2022-05-05 RX ORDER — ROPINIROLE 0.5 MG/1
0.5 TABLET, FILM COATED ORAL NIGHTLY
Status: DISCONTINUED | OUTPATIENT
Start: 2022-05-05 | End: 2022-05-06

## 2022-05-05 RX ORDER — METOCLOPRAMIDE HYDROCHLORIDE 5 MG/ML
5 INJECTION INTRAMUSCULAR; INTRAVENOUS EVERY 8 HOURS PRN
Status: DISCONTINUED | OUTPATIENT
Start: 2022-05-05 | End: 2022-05-06

## 2022-05-05 RX ORDER — LOSARTAN POTASSIUM 100 MG/1
100 TABLET ORAL DAILY
Status: DISCONTINUED | OUTPATIENT
Start: 2022-05-06 | End: 2022-05-06

## 2022-05-05 RX ORDER — DEXTROSE MONOHYDRATE 25 G/50ML
50 INJECTION, SOLUTION INTRAVENOUS
Status: DISCONTINUED | OUTPATIENT
Start: 2022-05-05 | End: 2022-05-06

## 2022-05-05 RX ORDER — ONDANSETRON 2 MG/ML
4 INJECTION INTRAMUSCULAR; INTRAVENOUS EVERY 6 HOURS PRN
Status: DISCONTINUED | OUTPATIENT
Start: 2022-05-05 | End: 2022-05-06

## 2022-05-05 RX ORDER — HEPARIN SODIUM 5000 [USP'U]/ML
5000 INJECTION, SOLUTION INTRAVENOUS; SUBCUTANEOUS EVERY 8 HOURS SCHEDULED
Status: DISCONTINUED | OUTPATIENT
Start: 2022-05-05 | End: 2022-05-06

## 2022-05-05 RX ORDER — IOHEXOL 350 MG/ML
100 INJECTION, SOLUTION INTRAVENOUS
Status: COMPLETED | OUTPATIENT
Start: 2022-05-05 | End: 2022-05-05

## 2022-05-05 RX ORDER — ACETAMINOPHEN 500 MG
1000 TABLET ORAL EVERY 8 HOURS PRN
Status: DISCONTINUED | OUTPATIENT
Start: 2022-05-05 | End: 2022-05-06

## 2022-05-05 RX ORDER — FOLIC ACID 1 MG/1
1 TABLET ORAL DAILY
Status: DISCONTINUED | OUTPATIENT
Start: 2022-05-06 | End: 2022-05-06

## 2022-05-05 RX ORDER — BISACODYL 10 MG
10 SUPPOSITORY, RECTAL RECTAL
Status: DISCONTINUED | OUTPATIENT
Start: 2022-05-05 | End: 2022-05-06

## 2022-05-05 RX ORDER — AMLODIPINE BESYLATE 5 MG/1
10 TABLET ORAL DAILY
Status: DISCONTINUED | OUTPATIENT
Start: 2022-05-05 | End: 2022-05-06

## 2022-05-05 RX ORDER — SODIUM PHOSPHATE, DIBASIC AND SODIUM PHOSPHATE, MONOBASIC 7; 19 G/133ML; G/133ML
1 ENEMA RECTAL ONCE AS NEEDED
Status: DISCONTINUED | OUTPATIENT
Start: 2022-05-05 | End: 2022-05-06

## 2022-05-05 RX ORDER — POLYETHYLENE GLYCOL 3350 17 G/17G
17 POWDER, FOR SOLUTION ORAL DAILY PRN
Status: DISCONTINUED | OUTPATIENT
Start: 2022-05-05 | End: 2022-05-06

## 2022-05-05 RX ORDER — SENNOSIDES 8.6 MG
17.2 TABLET ORAL NIGHTLY PRN
Status: DISCONTINUED | OUTPATIENT
Start: 2022-05-05 | End: 2022-05-06

## 2022-05-05 RX ORDER — ASPIRIN 81 MG/1
81 TABLET, CHEWABLE ORAL DAILY
Status: DISCONTINUED | OUTPATIENT
Start: 2022-05-05 | End: 2022-05-06

## 2022-05-05 RX ORDER — TRAZODONE HYDROCHLORIDE 50 MG/1
50 TABLET ORAL NIGHTLY
Status: DISCONTINUED | OUTPATIENT
Start: 2022-05-05 | End: 2022-05-06

## 2022-05-05 RX ORDER — NICOTINE POLACRILEX 4 MG
15 LOZENGE BUCCAL
Status: DISCONTINUED | OUTPATIENT
Start: 2022-05-05 | End: 2022-05-06

## 2022-05-05 RX ORDER — GUAIFENESIN 600 MG
600 TABLET, EXTENDED RELEASE 12 HR ORAL 2 TIMES DAILY
Status: DISCONTINUED | OUTPATIENT
Start: 2022-05-05 | End: 2022-05-06

## 2022-05-05 RX ORDER — ESCITALOPRAM OXALATE 20 MG/1
20 TABLET ORAL NIGHTLY
Status: DISCONTINUED | OUTPATIENT
Start: 2022-05-05 | End: 2022-05-06

## 2022-05-05 RX ORDER — BEBTELOVIMAB 87.5 MG/ML
175 INJECTION, SOLUTION INTRAVENOUS ONCE
Status: COMPLETED | OUTPATIENT
Start: 2022-05-05 | End: 2022-05-05

## 2022-05-05 RX ORDER — MELATONIN
3 NIGHTLY PRN
Status: DISCONTINUED | OUTPATIENT
Start: 2022-05-05 | End: 2022-05-06

## 2022-05-05 RX ORDER — NICOTINE POLACRILEX 4 MG
30 LOZENGE BUCCAL
Status: DISCONTINUED | OUTPATIENT
Start: 2022-05-05 | End: 2022-05-06

## 2022-05-05 NOTE — TELEPHONE ENCOUNTER
Patient's daughter called stating patient's symptoms are worsening since being seen in [facility] yesterday. Home covid test positive, PCR test pending. Fever spiked to 103 today, gave him two tylenol and down to 99. Increasing congestion in lungs with weak productive cough. Gave him mucinex. Has runny nose, sore throat and is feeling SOB. O2 saturation at best is 92% after attempting to deep breathe. Not drinking much fluid, is on fluid restriction due to kidney disease. Is on Dialysis but is missing today due to illness. Denies chest pain. Patient had covid in 2020 and was hospitalized in [place] for a long period of time and went to Rehab after that. Patient's daughter is also covid positive. Advised due to worsening symptoms and complicated medical history and missing dialysis today, patient should be seen in ED for evaluation and treatment. Verbalizes understanding and will call ambulance to transport patient since she is covid positive and can't take him to [facility].     Dr. Aria Samuel,  [address] [facility] 12/22/21 PE   +2

## 2022-05-05 NOTE — ED INITIAL ASSESSMENT (HPI)
Pt to the ER via [facility] for SOB. Pt tested positive for Covid yesterday. Pt states he started to feel sick yesterday before testing positive. Pt presents to the ER a&ox4. Respirations even and nonlabored. Pt was placed on a NR for EMS but presents to the ER with 98% on RA.     290g hand IV placed by EMS

## 2022-05-05 NOTE — IMAGING NOTE
PTS IV INFILTRATED. NURSE AWARE AND WITHOUT IMAGES ARE BEING PUT ONLINE. PER NURSE, PATIENT MAY NOT HAVE ANOTHER CTA DONE DEPENDING ON [facility] 5/6/22. THEY WILL PUT NEW ORDER IN IF NEEDED.

## 2022-05-05 NOTE — PLAN OF CARE
NURSING ADMISSION NOTE      Patient admitted via Cart  Oriented to room. Safety precautions initiated. Bed in low position. Call light in reach.       Problem: Patient/Family Goals  Goal: Patient/Family Long Term Goal  Description: Patient's Long Term Goal:   5/5: Discharge to home     Interventions:  - Follow plan of care   - See additional Care Plan goals for specific interventions  Outcome: Progressing  Goal: Patient/Family Short Term Goal  Description: Patient's Short Term Goal:   5/5: stay stable    Interventions:   - Meds, HD done   - See additional Care Plan goals for specific interventions  Outcome: Progressing     Problem: RESPIRATORY - ADULT  Goal: Achieves optimal ventilation and oxygenation  Description: INTERVENTIONS:  - Assess for changes in respiratory status  - Assess for changes in mentation and behavior  - Position to facilitate oxygenation and minimize respiratory effort  - Oxygen supplementation based on oxygen saturation or ABGs  - Provide Smoking Cessation handout, if applicable  - Encourage broncho-pulmonary hygiene including cough, deep breathe, Incentive Spirometry  - Assess the need for suctioning and perform as needed  - Assess and instruct to report SOB or any respiratory difficulty  - Respiratory Therapy support as indicated  - Manage/alleviate anxiety  - Monitor for signs/symptoms of CO2 retention  Outcome: Progressing     Problem: PAIN - ADULT  Goal: Verbalizes/displays adequate comfort level or patient's stated pain goal  Description: INTERVENTIONS:  - Encourage pt to monitor pain and request assistance  - Assess pain using appropriate pain scale  - Administer analgesics based on type and severity of pain and evaluate response  - Implement non-pharmacological measures as appropriate and evaluate response  - Consider cultural and social influences on pain and pain management  - Manage/alleviate anxiety  - Utilize distraction and/or relaxation techniques  - Monitor for opioid side effects  - Notify MD/LIP if interventions unsuccessful or patient reports new pain  - Anticipate increased pain with activity and pre-medicate as appropriate  Outcome: Progressing     Problem: SAFETY ADULT - FALL  Goal: Free from fall injury  Description: INTERVENTIONS:  - Assess pt frequently for physical needs  - Identify cognitive and physical deficits and behaviors that affect risk of falls.   - [place] fall precautions as indicated by assessment.  - Educate pt/family on patient safety including physical limitations  - Instruct pt to call for assistance with activity based on assessment  - Modify environment to reduce risk of injury  - Provide assistive devices as appropriate  - Consider OT/PT consult to assist with strengthening/mobility  - Encourage toileting schedule  Outcome: Progressing     Problem: DISCHARGE PLANNING  Goal: Discharge to home or other facility with appropriate resources  Description: INTERVENTIONS:  - Identify barriers to discharge w/pt and caregiver  - Include patient/family/discharge partner in discharge planning  - Arrange for needed discharge resources and transportation as appropriate  - Identify discharge learning needs (meds, wound care, etc)  - Arrange for interpreters to assist at discharge as needed  - Consider post-discharge preferences of patient/family/discharge partner  - Complete POLST form as appropriate  - Assess patient's ability to be responsible for managing their own health  - Refer to Case Management Department for coordinating discharge planning if the patient needs post-hospital services based on physician/LIP order or complex needs related to functional status, cognitive ability or social support system  Outcome: Progressing

## 2022-05-05 NOTE — ED QUICK NOTES
Pt personal glucose reading a blood sugar of 65 on his phone/ soo.  ER tech checked blood sugar with ER accucheck -- reading of 106

## 2022-05-05 NOTE — PROGRESS NOTES
[facility] Pharmacy Note:  Renal Dose Adjustment for Metoclopramide (REGLAN)    Geri Matthews has been prescribed Metoclopramide (REGLAN) 10 mg every 8 hours as needed for nausea/vomiting,. Estimated Creatinine Clearance: 5.9 mL/min (A) (based on SCr of 9.98 mg/dL (H)). Calculated creatinine clearance is < 40 ml/min, therefore, the dose of Metoclopramide (REGLAN) has been changed to 5 mg every 8 hours as needed for nausea/vomiting per P&T approved protocol. Pharmacy will continue to follow, and if renal function improves, will resume the original order.        Thank you,  Claudia Navarro, PharmD  5/5/2022 3:27 PM

## 2022-05-06 VITALS
HEART RATE: 59 BPM | DIASTOLIC BLOOD PRESSURE: 123 MMHG | WEIGHT: 177.5 LBS | BODY MASS INDEX: 26 KG/M2 | TEMPERATURE: 98 F | OXYGEN SATURATION: 98 % | RESPIRATION RATE: 17 BRPM | SYSTOLIC BLOOD PRESSURE: 142 MMHG

## 2022-05-06 LAB
ALBUMIN SERPL-MCNC: 2.9 G/DL (ref 3.4–5)
ALBUMIN/GLOB SERPL: 0.8 {RATIO} (ref 1–2)
ALP LIVER SERPL-CCNC: 77 U/L
ALT SERPL-CCNC: 16 U/L
ANION GAP SERPL CALC-SCNC: 9 MMOL/L (ref 0–18)
AST SERPL-CCNC: 3 U/L (ref 15–37)
BASOPHILS # BLD AUTO: 0.01 X10(3) UL (ref 0–0.2)
BASOPHILS NFR BLD AUTO: 0.3 %
BILIRUB SERPL-MCNC: 0.5 MG/DL (ref 0.1–2)
BUN BLD-MCNC: 54 MG/DL (ref 7–18)
CALCIUM BLD-MCNC: 8.6 MG/DL (ref 8.5–10.1)
CHLORIDE SERPL-SCNC: 99 MMOL/L (ref 98–112)
CO2 SERPL-SCNC: 26 MMOL/L (ref 21–32)
CREAT BLD-MCNC: 6.74 MG/DL
CRP SERPL-MCNC: 1.62 MG/DL (ref ?–0.3)
D DIMER PPP FEU-MCNC: 0.74 UG/ML FEU (ref ?–0.8)
DEPRECATED HBV CORE AB SER IA-ACNC: 744.7 NG/ML
EOSINOPHIL # BLD AUTO: 0.07 X10(3) UL (ref 0–0.7)
EOSINOPHIL NFR BLD AUTO: 2.2 %
ERYTHROCYTE [DISTWIDTH] IN BLOOD BY AUTOMATED COUNT: 14.2 %
GLOBULIN PLAS-MCNC: 3.5 G/DL (ref 2.8–4.4)
GLUCOSE BLD-MCNC: 109 MG/DL (ref 70–99)
GLUCOSE BLD-MCNC: 213 MG/DL (ref 70–99)
GLUCOSE BLD-MCNC: 89 MG/DL (ref 70–99)
HBV CORE AB SERPL QL IA: NONREACTIVE
HBV SURFACE AB SER QL: REACTIVE
HBV SURFACE AB SERPL IA-ACNC: 50.14 MIU/ML
HBV SURFACE AG SER-ACNC: <0.1 [IU]/L
HBV SURFACE AG SERPL QL IA: NONREACTIVE
HCT VFR BLD AUTO: 27.6 %
HGB BLD-MCNC: 9.6 G/DL
IMM GRANULOCYTES # BLD AUTO: 0.01 X10(3) UL (ref 0–1)
IMM GRANULOCYTES NFR BLD: 0.3 %
LDH SERPL L TO P-CCNC: 143 U/L
LYMPHOCYTES # BLD AUTO: 1.04 X10(3) UL (ref 1–4)
LYMPHOCYTES NFR BLD AUTO: 32.3 %
MCH RBC QN AUTO: 34.2 PG (ref 26–34)
MCHC RBC AUTO-ENTMCNC: 34.8 G/DL (ref 31–37)
MCV RBC AUTO: 98.2 FL
MONOCYTES # BLD AUTO: 0.67 X10(3) UL (ref 0.1–1)
MONOCYTES NFR BLD AUTO: 20.8 %
NEUTROPHILS # BLD AUTO: 1.42 X10 (3) UL (ref 1.5–7.7)
NEUTROPHILS # BLD AUTO: 1.42 X10(3) UL (ref 1.5–7.7)
NEUTROPHILS NFR BLD AUTO: 44.1 %
OSMOLALITY SERPL CALC.SUM OF ELEC: 299 MOSM/KG (ref 275–295)
PLATELET # BLD AUTO: 68 10(3)UL (ref 150–450)
POTASSIUM SERPL-SCNC: 5.1 MMOL/L (ref 3.5–5.1)
PROT SERPL-MCNC: 6.4 G/DL (ref 6.4–8.2)
RBC # BLD AUTO: 2.81 X10(6)UL
SODIUM SERPL-SCNC: 134 MMOL/L (ref 136–145)
WBC # BLD AUTO: 3.2 X10(3) UL (ref 4–11)

## 2022-05-06 PROCEDURE — 97162 PT EVAL MOD COMPLEX 30 MIN: CPT

## 2022-05-06 PROCEDURE — 86140 C-REACTIVE PROTEIN: CPT | Performed by: INTERNAL MEDICINE

## 2022-05-06 PROCEDURE — 82962 GLUCOSE BLOOD TEST: CPT

## 2022-05-06 PROCEDURE — 82728 ASSAY OF FERRITIN: CPT | Performed by: INTERNAL MEDICINE

## 2022-05-06 PROCEDURE — 97535 SELF CARE MNGMENT TRAINING: CPT

## 2022-05-06 PROCEDURE — 83615 LACTATE (LD) (LDH) ENZYME: CPT | Performed by: INTERNAL MEDICINE

## 2022-05-06 PROCEDURE — 97116 GAIT TRAINING THERAPY: CPT

## 2022-05-06 PROCEDURE — 97530 THERAPEUTIC ACTIVITIES: CPT

## 2022-05-06 PROCEDURE — 97165 OT EVAL LOW COMPLEX 30 MIN: CPT

## 2022-05-06 PROCEDURE — 80053 COMPREHEN METABOLIC PANEL: CPT | Performed by: INTERNAL MEDICINE

## 2022-05-06 PROCEDURE — 85025 COMPLETE CBC W/AUTO DIFF WBC: CPT | Performed by: INTERNAL MEDICINE

## 2022-05-06 PROCEDURE — 85379 FIBRIN DEGRADATION QUANT: CPT | Performed by: INTERNAL MEDICINE

## 2022-05-06 RX ORDER — HYDRALAZINE HYDROCHLORIDE 20 MG/ML
10 INJECTION INTRAMUSCULAR; INTRAVENOUS EVERY 6 HOURS PRN
Status: DISCONTINUED | OUTPATIENT
Start: 2022-05-06 | End: 2022-05-06

## 2022-05-06 NOTE — PLAN OF CARE
Pt A/O x 4. RA. Dry, unproductive cough. NSR on tele. Heparin SubQ. PRN hydralazine for HTN. Voids independently. SBA with cane. No pain reported this shift. IV intact. L AV Fistula, strong bruit and thrill. Plan for dialysis tomorrow, potential discharge. Plan of care discussed with patient, no other needs at this time.    Problem: Patient/Family Goals  Goal: Patient/Family Long Term Goal  Description: Patient's Long Term Goal:   5/5: Discharge to home     Interventions:  - Follow plan of care   - See additional Care Plan goals for specific interventions  Outcome: Progressing  Goal: Patient/Family Short Term Goal  Description: Patient's Short Term Goal:   5/5: stay stable  5/5 noc: control bp  5/6 AM: Rest, IS  Interventions:   - Meds, HD done   - See additional Care Plan goals for specific interventions  Outcome: Progressing     Problem: RESPIRATORY - ADULT  Goal: Achieves optimal ventilation and oxygenation  Description: INTERVENTIONS:  - Assess for changes in respiratory status  - Assess for changes in mentation and behavior  - Position to facilitate oxygenation and minimize respiratory effort  - Oxygen supplementation based on oxygen saturation or ABGs  - Provide Smoking Cessation handout, if applicable  - Encourage broncho-pulmonary hygiene including cough, deep breathe, Incentive Spirometry  - Assess the need for suctioning and perform as needed  - Assess and instruct to report SOB or any respiratory difficulty  - Respiratory Therapy support as indicated  - Manage/alleviate anxiety  - Monitor for signs/symptoms of CO2 retention  Outcome: Progressing     Problem: PAIN - ADULT  Goal: Verbalizes/displays adequate comfort level or patient's stated pain goal  Description: INTERVENTIONS:  - Encourage pt to monitor pain and request assistance  - Assess pain using appropriate pain scale  - Administer analgesics based on type and severity of pain and evaluate response  - Implement non-pharmacological measures as appropriate and evaluate response  - Consider cultural and social influences on pain and pain management  - Manage/alleviate anxiety  - Utilize distraction and/or relaxation techniques  - Monitor for opioid side effects  - Notify MD/LIP if interventions unsuccessful or patient reports new pain  - Anticipate increased pain with activity and pre-medicate as appropriate  Outcome: Progressing     Problem: SAFETY ADULT - FALL  Goal: Free from fall injury  Description: INTERVENTIONS:  - Assess pt frequently for physical needs  - Identify cognitive and physical deficits and behaviors that affect risk of falls.   - [place] fall precautions as indicated by assessment.  - Educate pt/family on patient safety including physical limitations  - Instruct pt to call for assistance with activity based on assessment  - Modify environment to reduce risk of injury  - Provide assistive devices as appropriate  - Consider OT/PT consult to assist with strengthening/mobility  - Encourage toileting schedule  Outcome: Progressing     Problem: DISCHARGE PLANNING  Goal: Discharge to home or other facility with appropriate resources  Description: INTERVENTIONS:  - Identify barriers to discharge w/pt and caregiver  - Include patient/family/discharge partner in discharge planning  - Arrange for needed discharge resources and transportation as appropriate  - Identify discharge learning needs (meds, wound care, etc)  - Arrange for interpreters to assist at discharge as needed  - Consider post-discharge preferences of patient/family/discharge partner  - Complete POLST form as appropriate  - Assess patient's ability to be responsible for managing their own health  - Refer to Case Management Department for coordinating discharge planning if the patient needs post-hospital services based on physician/LIP order or complex needs related to functional status, cognitive ability or social support system  Outcome: Progressing

## 2022-05-06 NOTE — CM/SW NOTE
Referral for outpatient hd at a [facility] is still pending. rn and dtr called to make aware.     Cesar oMctezuma RN,   [id number]

## 2022-05-06 NOTE — CM/SW NOTE
05/06/22 1600   Discharge disposition   Expected discharge disposition Home or [facility] Home   Outpatient services Dialysis   Discharge transportation Private car     Call from Elton at [facility], confirmed patient is setup to start at [address] tomorrow 5/7 at [address] [place] [address], clinic has called dtr to inform. Call to dtr [facility] to confirm. rn made aware.     Ashley Zaragoza RN,   [id number]

## 2022-05-06 NOTE — PLAN OF CARE
Patient alert and oriented x4, soft spoken. Up with standby assist and cane. Maintaining o2 sats >90% on RA. Denies SOB. Elevated bp, notified MD. PRN hydralazine given. Recheck done, bp came down. Dialysis ended at 2300, removed 2L. Patient tolerated well. Left arm precautions d/t AV fistula. Denies pain. Updated patient on POC, no questions at this time. Safety precautions put in place, bed alarm on.      Problem: Patient/Family Goals  Goal: Patient/Family Long Term Goal  Description: Patient's Long Term Goal:   5/5: Discharge to home     Interventions:  - Follow plan of care   - See additional Care Plan goals for specific interventions  Outcome: Progressing  Goal: Patient/Family Short Term Goal  Description: Patient's Short Term Goal:   5/5: stay stable  5/5 noc: control bp    Interventions:   - Meds, HD done   - See additional Care Plan goals for specific interventions  Outcome: Progressing     Problem: RESPIRATORY - ADULT  Goal: Achieves optimal ventilation and oxygenation  Description: INTERVENTIONS:  - Assess for changes in respiratory status  - Assess for changes in mentation and behavior  - Position to facilitate oxygenation and minimize respiratory effort  - Oxygen supplementation based on oxygen saturation or ABGs  - Provide Smoking Cessation handout, if applicable  - Encourage broncho-pulmonary hygiene including cough, deep breathe, Incentive Spirometry  - Assess the need for suctioning and perform as needed  - Assess and instruct to report SOB or any respiratory difficulty  - Respiratory Therapy support as indicated  - Manage/alleviate anxiety  - Monitor for signs/symptoms of CO2 retention  Outcome: Progressing     Problem: PAIN - ADULT  Goal: Verbalizes/displays adequate comfort level or patient's stated pain goal  Description: INTERVENTIONS:  - Encourage pt to monitor pain and request assistance  - Assess pain using appropriate pain scale  - Administer analgesics based on type and severity of pain and evaluate response  - Implement non-pharmacological measures as appropriate and evaluate response  - Consider cultural and social influences on pain and pain management  - Manage/alleviate anxiety  - Utilize distraction and/or relaxation techniques  - Monitor for opioid side effects  - Notify MD/LIP if interventions unsuccessful or patient reports new pain  - Anticipate increased pain with activity and pre-medicate as appropriate  Outcome: Progressing     Problem: SAFETY ADULT - FALL  Goal: Free from fall injury  Description: INTERVENTIONS:  - Assess pt frequently for physical needs  - Identify cognitive and physical deficits and behaviors that affect risk of falls.   - [place] fall precautions as indicated by assessment.  - Educate pt/family on patient safety including physical limitations  - Instruct pt to call for assistance with activity based on assessment  - Modify environment to reduce risk of injury  - Provide assistive devices as appropriate  - Consider OT/PT consult to assist with strengthening/mobility  - Encourage toileting schedule  Outcome: Progressing     Problem: DISCHARGE PLANNING  Goal: Discharge to home or other facility with appropriate resources  Description: INTERVENTIONS:  - Identify barriers to discharge w/pt and caregiver  - Include patient/family/discharge partner in discharge planning  - Arrange for needed discharge resources and transportation as appropriate  - Identify discharge learning needs (meds, wound care, etc)  - Arrange for interpreters to assist at discharge as needed  - Consider post-discharge preferences of patient/family/discharge partner  - Complete POLST form as appropriate  - Assess patient's ability to be responsible for managing their own health  - Refer to Case Management Department for coordinating discharge planning if the patient needs post-hospital services based on physician/LIP order or complex needs related to functional status, cognitive ability or social support system  Outcome: Progressing

## 2022-05-06 NOTE — CM/SW NOTE
05/06/22 1100   CM/SW Referral Data   Referral Source Physician   Reason for Referral Discharge planning   Informant Daughter  Rodrick Sorensen [phone] )   Pertinent Medical Hx   Does patient have an established PCP? Yes   [address]   Patient lives with   (w/family)   Patient Status Prior to Admission   Independent with ADLs and Mobility No   Pt. requires assistance with Driving;Meals   Services in place prior to admission Dialysis   Dialysis Clinic [facility]   Scheduled Dialysis days T-TH-SAT   Discharge Needs   Anticipated D/C needs Outpatient dialysis     Received order to setup patient at an outpatient HD for COVID + patients. Referral sent thru [employer] online portal, pending acceptance. Will need to send hep B when available. Patient is due for HD tomorrow. Call to dtr to review, she is aware of need for covid clinic. Her spouse and children are negative covid. Receives transportation services thru insurance, dtr will communicate change in clinic w/company.     Tracey Rolle RN,   [id number]

## 2022-05-06 NOTE — PLAN OF CARE
Problem: Patient/Family Goals  Goal: Patient/Family Long Term Goal  Description: Patient's Long Term Goal:   5/5: Discharge to home     Interventions:  - Follow plan of care   - See additional Care Plan goals for specific interventions  5/6/2022 1754 by Richie Banks RN  Outcome: Adequate for Discharge  5/6/2022 1515 by Richie Banks RN  Outcome: Progressing  Goal: Patient/Family Short Term Goal  Description: Patient's Short Term Goal:   5/5: stay stable  5/5 noc: control bp    Interventions:   - Meds, HD done   - See additional Care Plan goals for specific interventions  5/6/2022 1754 by Richie Banks RN  Outcome: Adequate for Discharge  5/6/2022 1515 by Richie Banks RN  Outcome: Progressing     Problem: RESPIRATORY - ADULT  Goal: Achieves optimal ventilation and oxygenation  Description: INTERVENTIONS:  - Assess for changes in respiratory status  - Assess for changes in mentation and behavior  - Position to facilitate oxygenation and minimize respiratory effort  - Oxygen supplementation based on oxygen saturation or ABGs  - Provide Smoking Cessation handout, if applicable  - Encourage broncho-pulmonary hygiene including cough, deep breathe, Incentive Spirometry  - Assess the need for suctioning and perform as needed  - Assess and instruct to report SOB or any respiratory difficulty  - Respiratory Therapy support as indicated  - Manage/alleviate anxiety  - Monitor for signs/symptoms of CO2 retention  5/6/2022 1754 by Richie Banks RN  Outcome: Adequate for Discharge  5/6/2022 1515 by Richie Banks RN  Outcome: Progressing     Problem: PAIN - ADULT  Goal: Verbalizes/displays adequate comfort level or patient's stated pain goal  Description: INTERVENTIONS:  - Encourage pt to monitor pain and request assistance  - Assess pain using appropriate pain scale  - Administer analgesics based on type and severity of pain and evaluate response  - Implement non-pharmacological measures as appropriate and evaluate response  - Consider cultural and social influences on pain and pain management  - Manage/alleviate anxiety  - Utilize distraction and/or relaxation techniques  - Monitor for opioid side effects  - Notify MD/LIP if interventions unsuccessful or patient reports new pain  - Anticipate increased pain with activity and pre-medicate as appropriate  5/6/2022 1754 by Tony Hull RN  Outcome: Adequate for Discharge  5/6/2022 1515 by Tony Hull RN  Outcome: Progressing     Problem: SAFETY ADULT - FALL  Goal: Free from fall injury  Description: INTERVENTIONS:  - Assess pt frequently for physical needs  - Identify cognitive and physical deficits and behaviors that affect risk of falls.   - [place] fall precautions as indicated by assessment.  - Educate pt/family on patient safety including physical limitations  - Instruct pt to call for assistance with activity based on assessment  - Modify environment to reduce risk of injury  - Provide assistive devices as appropriate  - Consider OT/PT consult to assist with strengthening/mobility  - Encourage toileting schedule  5/6/2022 1754 by Tony Hull RN  Outcome: Adequate for Discharge  5/6/2022 1515 by Tony Hull RN  Outcome: Progressing     Problem: DISCHARGE PLANNING  Goal: Discharge to home or other facility with appropriate resources  Description: INTERVENTIONS:  - Identify barriers to discharge w/pt and caregiver  - Include patient/family/discharge partner in discharge planning  - Arrange for needed discharge resources and transportation as appropriate  - Identify discharge learning needs (meds, wound care, etc)  - Arrange for interpreters to assist at discharge as needed  - Consider post-discharge preferences of patient/family/discharge partner  - Complete POLST form as appropriate  - Assess patient's ability to be responsible for managing their own health  - Refer to Case Management Department for coordinating discharge planning if the patient needs post-hospital services based on physician/LIP order or complex needs related to functional status, cognitive ability or social support system  5/6/2022 1754 by Veronica Sorensen RN  Outcome: Adequate for Discharge  5/6/2022 1515 by Veronica Sorensen RN  Outcome: Progressing

## 2022-05-09 ENCOUNTER — PATIENT OUTREACH (OUTPATIENT)
Dept: CASE MANAGEMENT | Age: 82
End: 2022-05-09

## 2022-05-09 ENCOUNTER — TELEPHONE (OUTPATIENT)
Dept: FAMILY MEDICINE CLINIC | Facility: CLINIC | Age: 82
End: 2022-05-09

## 2022-05-10 NOTE — TELEPHONE ENCOUNTER
Called and rescheduled patient's HFU appt to next week. Needs late afternoon appt for transportation.     Future Appointments   Date Time Provider James Jensen   5/16/2022  6:30 PM Summer Walden MD [facility]

## 2022-05-16 ENCOUNTER — OFFICE VISIT (OUTPATIENT)
Dept: FAMILY MEDICINE CLINIC | Facility: CLINIC | Age: 82
End: 2022-05-16
Payer: MEDICAID

## 2022-05-16 VITALS
HEART RATE: 76 BPM | RESPIRATION RATE: 16 BRPM | TEMPERATURE: 98 F | HEIGHT: 67 IN | BODY MASS INDEX: 25.74 KG/M2 | OXYGEN SATURATION: 98 % | WEIGHT: 164 LBS | SYSTOLIC BLOOD PRESSURE: 138 MMHG | DIASTOLIC BLOOD PRESSURE: 62 MMHG

## 2022-05-16 DIAGNOSIS — Z99.2 CKD (CHRONIC KIDNEY DISEASE) REQUIRING CHRONIC DIALYSIS (HCC): ICD-10-CM

## 2022-05-16 DIAGNOSIS — C90.00 MULTIPLE MYELOMA, REMISSION STATUS UNSPECIFIED (HCC): ICD-10-CM

## 2022-05-16 DIAGNOSIS — N18.6 TYPE 2 DIABETES MELLITUS WITH CHRONIC KIDNEY DISEASE ON CHRONIC DIALYSIS, WITH LONG-TERM CURRENT USE OF INSULIN (HCC): ICD-10-CM

## 2022-05-16 DIAGNOSIS — Z99.2 TYPE 2 DIABETES MELLITUS WITH CHRONIC KIDNEY DISEASE ON CHRONIC DIALYSIS, WITH LONG-TERM CURRENT USE OF INSULIN (HCC): ICD-10-CM

## 2022-05-16 DIAGNOSIS — U07.1 COVID-19: Primary | ICD-10-CM

## 2022-05-16 DIAGNOSIS — I10 ESSENTIAL HYPERTENSION: ICD-10-CM

## 2022-05-16 DIAGNOSIS — E11.22 TYPE 2 DIABETES MELLITUS WITH CHRONIC KIDNEY DISEASE ON CHRONIC DIALYSIS, WITH LONG-TERM CURRENT USE OF INSULIN (HCC): ICD-10-CM

## 2022-05-16 DIAGNOSIS — Z79.4 TYPE 2 DIABETES MELLITUS WITH CHRONIC KIDNEY DISEASE ON CHRONIC DIALYSIS, WITH LONG-TERM CURRENT USE OF INSULIN (HCC): ICD-10-CM

## 2022-05-16 DIAGNOSIS — N18.6 CKD (CHRONIC KIDNEY DISEASE) REQUIRING CHRONIC DIALYSIS (HCC): ICD-10-CM

## 2022-05-16 DIAGNOSIS — R53.1 GENERALIZED WEAKNESS: ICD-10-CM

## 2022-05-16 PROBLEM — G20 PARKINSON'S DISEASE: Status: ACTIVE | Noted: 2022-05-16

## 2022-05-16 PROBLEM — G20.A1 PARKINSON'S DISEASE (HCC): Status: ACTIVE | Noted: 2022-05-16

## 2022-05-16 PROBLEM — G20 PARKINSON'S DISEASE (HCC): Status: ACTIVE | Noted: 2022-05-16

## 2022-05-16 PROBLEM — G20.A1 PARKINSON'S DISEASE: Status: ACTIVE | Noted: 2022-05-16

## 2022-05-16 PROCEDURE — 3078F DIAST BP <80 MM HG: CPT | Performed by: FAMILY MEDICINE

## 2022-05-16 PROCEDURE — 3008F BODY MASS INDEX DOCD: CPT | Performed by: FAMILY MEDICINE

## 2022-05-16 PROCEDURE — 99214 OFFICE O/P EST MOD 30 MIN: CPT | Performed by: FAMILY MEDICINE

## 2022-05-16 PROCEDURE — 1111F DSCHRG MED/CURRENT MED MERGE: CPT | Performed by: FAMILY MEDICINE

## 2022-05-16 PROCEDURE — 3075F SYST BP GE 130 - 139MM HG: CPT | Performed by: FAMILY MEDICINE

## 2022-05-16 RX ORDER — AZITHROMYCIN 250 MG/1
TABLET, FILM COATED ORAL
Qty: 6 TABLET | Refills: 0 | Status: SHIPPED | OUTPATIENT
Start: 2022-05-16 | End: 2022-05-21

## 2022-05-17 NOTE — PROGRESS NOTES
Multiple attempts to reach pt and messages left with no return call. Patient went in for HFU appt with PCP on 5/16/22. Encounter closing.

## 2022-05-22 ENCOUNTER — PATIENT MESSAGE (OUTPATIENT)
Dept: FAMILY MEDICINE CLINIC | Facility: CLINIC | Age: 82
End: 2022-05-22

## 2022-05-22 DIAGNOSIS — G25.81 RLS (RESTLESS LEGS SYNDROME): ICD-10-CM

## 2022-05-22 DIAGNOSIS — G47.00 INSOMNIA, UNSPECIFIED TYPE: Primary | ICD-10-CM

## 2022-05-23 RX ORDER — ROPINIROLE 0.5 MG/1
1 TABLET, FILM COATED ORAL NIGHTLY
Qty: 180 TABLET | Refills: 1 | Status: SHIPPED | OUTPATIENT
Start: 2022-05-23

## 2022-05-23 RX ORDER — TRAZODONE HYDROCHLORIDE 50 MG/1
50 TABLET ORAL NIGHTLY
Qty: 90 TABLET | Refills: 1 | Status: SHIPPED | OUTPATIENT
Start: 2022-05-23

## 2022-05-23 NOTE — TELEPHONE ENCOUNTER
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 . He has been super restless for the past few nights and could sleep due to that. I gave him 2 tabs last night and he said he slept better after that. If yes then please send the Rx to [facility] [address]. He also needs a refill for Trazadone.   Thank you

## 2022-05-23 NOTE — TELEPHONE ENCOUNTER
LOV 05-16-22    Do you want to increase dose of Requip? Pended new order, if you agree. Also pended refill for Trazodone.

## 2022-06-03 ENCOUNTER — HOSPITAL ENCOUNTER (OUTPATIENT)
Facility: HOSPITAL | Age: 82
Setting detail: OBSERVATION
Discharge: HOME | End: 2022-06-05
Attending: EMERGENCY MEDICINE | Admitting: INTERNAL MEDICINE
Payer: MEDICAID

## 2022-06-03 DIAGNOSIS — Z51.89 ENCOUNTER FOR BLOOD TRANSFUSION: ICD-10-CM

## 2022-06-03 DIAGNOSIS — D64.9 ANEMIA, UNSPECIFIED TYPE: Primary | ICD-10-CM

## 2022-06-03 LAB
ANION GAP SERPL CALC-SCNC: 7 MMOL/L (ref 0–18)
ANTIBODY SCREEN: NEGATIVE
BASOPHILS # BLD AUTO: 0.01 X10(3) UL (ref 0–0.2)
BASOPHILS NFR BLD AUTO: 0.2 %
BUN BLD-MCNC: 43 MG/DL (ref 7–18)
CALCIUM BLD-MCNC: 9.3 MG/DL (ref 8.5–10.1)
CHLORIDE SERPL-SCNC: 101 MMOL/L (ref 98–112)
CO2 SERPL-SCNC: 31 MMOL/L (ref 21–32)
CREAT BLD-MCNC: 6.98 MG/DL
EOSINOPHIL # BLD AUTO: 0.32 X10(3) UL (ref 0–0.7)
EOSINOPHIL NFR BLD AUTO: 6.2 %
ERYTHROCYTE [DISTWIDTH] IN BLOOD BY AUTOMATED COUNT: 17.2 %
GLUCOSE BLD-MCNC: 143 MG/DL (ref 70–99)
GLUCOSE BLD-MCNC: 214 MG/DL (ref 70–99)
HCT VFR BLD AUTO: 18.7 %
HGB BLD-MCNC: 6.3 G/DL
IMM GRANULOCYTES # BLD AUTO: 0.04 X10(3) UL (ref 0–1)
IMM GRANULOCYTES NFR BLD: 0.8 %
LYMPHOCYTES # BLD AUTO: 1.76 X10(3) UL (ref 1–4)
LYMPHOCYTES NFR BLD AUTO: 34.3 %
MCH RBC QN AUTO: 34.1 PG (ref 26–34)
MCHC RBC AUTO-ENTMCNC: 33.7 G/DL (ref 31–37)
MCV RBC AUTO: 101.1 FL
MONOCYTES # BLD AUTO: 0.71 X10(3) UL (ref 0.1–1)
MONOCYTES NFR BLD AUTO: 13.8 %
NEUTROPHILS # BLD AUTO: 2.29 X10 (3) UL (ref 1.5–7.7)
NEUTROPHILS # BLD AUTO: 2.29 X10(3) UL (ref 1.5–7.7)
NEUTROPHILS NFR BLD AUTO: 44.7 %
OSMOLALITY SERPL CALC.SUM OF ELEC: 301 MOSM/KG (ref 275–295)
PLATELET # BLD AUTO: 81 10(3)UL (ref 150–450)
POTASSIUM SERPL-SCNC: 4 MMOL/L (ref 3.5–5.1)
RBC # BLD AUTO: 1.85 X10(6)UL
RH BLOOD TYPE: POSITIVE
SARS-COV-2 RNA RESP QL NAA+PROBE: NOT DETECTED
SODIUM SERPL-SCNC: 139 MMOL/L (ref 136–145)
WBC # BLD AUTO: 5.1 X10(3) UL (ref 4–11)

## 2022-06-03 PROCEDURE — 86901 BLOOD TYPING SEROLOGIC RH(D): CPT | Performed by: EMERGENCY MEDICINE

## 2022-06-03 PROCEDURE — 86920 COMPATIBILITY TEST SPIN: CPT

## 2022-06-03 PROCEDURE — 86900 BLOOD TYPING SEROLOGIC ABO: CPT | Performed by: EMERGENCY MEDICINE

## 2022-06-03 PROCEDURE — 30233N1 TRANSFUSION OF NONAUTOLOGOUS RED BLOOD CELLS INTO PERIPHERAL VEIN, PERCUTANEOUS APPROACH: ICD-10-PCS | Performed by: EMERGENCY MEDICINE

## 2022-06-03 PROCEDURE — 99285 EMERGENCY DEPT VISIT HI MDM: CPT

## 2022-06-03 PROCEDURE — 85018 HEMOGLOBIN: CPT | Performed by: INTERNAL MEDICINE

## 2022-06-03 PROCEDURE — 36430 TRANSFUSION BLD/BLD COMPNT: CPT

## 2022-06-03 PROCEDURE — 85025 COMPLETE CBC W/AUTO DIFF WBC: CPT | Performed by: EMERGENCY MEDICINE

## 2022-06-03 PROCEDURE — 82962 GLUCOSE BLOOD TEST: CPT

## 2022-06-03 PROCEDURE — 86850 RBC ANTIBODY SCREEN: CPT | Performed by: EMERGENCY MEDICINE

## 2022-06-03 PROCEDURE — 80048 BASIC METABOLIC PNL TOTAL CA: CPT | Performed by: EMERGENCY MEDICINE

## 2022-06-03 RX ORDER — HEPARIN SODIUM 5000 [USP'U]/ML
5000 INJECTION, SOLUTION INTRAVENOUS; SUBCUTANEOUS EVERY 12 HOURS SCHEDULED
Status: DISCONTINUED | OUTPATIENT
Start: 2022-06-03 | End: 2022-06-03

## 2022-06-03 RX ORDER — ONDANSETRON 2 MG/ML
4 INJECTION INTRAMUSCULAR; INTRAVENOUS EVERY 6 HOURS PRN
Status: DISCONTINUED | OUTPATIENT
Start: 2022-06-03 | End: 2022-06-05

## 2022-06-03 RX ORDER — ACETAMINOPHEN 325 MG/1
650 TABLET ORAL EVERY 6 HOURS PRN
Status: DISCONTINUED | OUTPATIENT
Start: 2022-06-03 | End: 2022-06-05

## 2022-06-03 RX ORDER — DEXTROSE MONOHYDRATE 25 G/50ML
50 INJECTION, SOLUTION INTRAVENOUS
Status: DISCONTINUED | OUTPATIENT
Start: 2022-06-03 | End: 2022-06-05

## 2022-06-03 RX ORDER — NICOTINE POLACRILEX 4 MG
30 LOZENGE BUCCAL
Status: DISCONTINUED | OUTPATIENT
Start: 2022-06-03 | End: 2022-06-05

## 2022-06-03 RX ORDER — NICOTINE POLACRILEX 4 MG
15 LOZENGE BUCCAL
Status: DISCONTINUED | OUTPATIENT
Start: 2022-06-03 | End: 2022-06-05

## 2022-06-03 RX ORDER — MELATONIN
3 NIGHTLY PRN
Status: DISCONTINUED | OUTPATIENT
Start: 2022-06-03 | End: 2022-06-05

## 2022-06-04 LAB
ANION GAP SERPL CALC-SCNC: 7 MMOL/L (ref 0–18)
BASOPHILS # BLD AUTO: 0 X10(3) UL (ref 0–0.2)
BASOPHILS NFR BLD AUTO: 0 %
BUN BLD-MCNC: 50 MG/DL (ref 7–18)
CALCIUM BLD-MCNC: 9.2 MG/DL (ref 8.5–10.1)
CHLORIDE SERPL-SCNC: 104 MMOL/L (ref 98–112)
CO2 SERPL-SCNC: 29 MMOL/L (ref 21–32)
CREAT BLD-MCNC: 7.88 MG/DL
EOSINOPHIL # BLD AUTO: 0.28 X10(3) UL (ref 0–0.7)
EOSINOPHIL NFR BLD AUTO: 6.7 %
ERYTHROCYTE [DISTWIDTH] IN BLOOD BY AUTOMATED COUNT: 17.5 %
FOLATE SERPL-MCNC: 32.9 NG/ML (ref 8.7–?)
GLUCOSE BLD-MCNC: 127 MG/DL (ref 70–99)
GLUCOSE BLD-MCNC: 133 MG/DL (ref 70–99)
GLUCOSE BLD-MCNC: 175 MG/DL (ref 70–99)
GLUCOSE BLD-MCNC: 214 MG/DL (ref 70–99)
GLUCOSE BLD-MCNC: 223 MG/DL (ref 70–99)
GLUCOSE BLD-MCNC: 97 MG/DL (ref 70–99)
HCT VFR BLD AUTO: 20.3 %
HCT VFR BLD AUTO: 24.6 %
HGB BLD-MCNC: 6.8 G/DL
HGB BLD-MCNC: 7.1 G/DL
HGB BLD-MCNC: 8.8 G/DL
HGB RETIC QN AUTO: 43.6 PG (ref 28.2–36.6)
IMM GRANULOCYTES # BLD AUTO: 0.02 X10(3) UL (ref 0–1)
IMM GRANULOCYTES NFR BLD: 0.5 %
IMM RETICS NFR: 0.34 RATIO (ref 0.1–0.3)
IRON SATN MFR SERPL: 35 %
IRON SERPL-MCNC: 83 UG/DL
LYMPHOCYTES # BLD AUTO: 1.54 X10(3) UL (ref 1–4)
LYMPHOCYTES NFR BLD AUTO: 36.6 %
MCH RBC QN AUTO: 33 PG (ref 26–34)
MCHC RBC AUTO-ENTMCNC: 33.5 G/DL (ref 31–37)
MCV RBC AUTO: 98.5 FL
MONOCYTES # BLD AUTO: 0.59 X10(3) UL (ref 0.1–1)
MONOCYTES NFR BLD AUTO: 14 %
NEUTROPHILS # BLD AUTO: 1.78 X10 (3) UL (ref 1.5–7.7)
NEUTROPHILS # BLD AUTO: 1.78 X10(3) UL (ref 1.5–7.7)
NEUTROPHILS NFR BLD AUTO: 42.2 %
OSMOLALITY SERPL CALC.SUM OF ELEC: 305 MOSM/KG (ref 275–295)
PLATELET # BLD AUTO: 72 10(3)UL (ref 150–450)
POTASSIUM SERPL-SCNC: 4.3 MMOL/L (ref 3.5–5.1)
RBC # BLD AUTO: 2.06 X10(6)UL
RETICS # AUTO: 125 X10(3) UL (ref 22.5–147.5)
RETICS/RBC NFR AUTO: 6.2 %
SODIUM SERPL-SCNC: 140 MMOL/L (ref 136–145)
TIBC SERPL-MCNC: 238 UG/DL (ref 240–450)
TRANSFERRIN SERPL-MCNC: 160 MG/DL (ref 200–360)
VIT B12 SERPL-MCNC: 778 PG/ML (ref 193–986)
WBC # BLD AUTO: 4.2 X10(3) UL (ref 4–11)

## 2022-06-04 PROCEDURE — 85014 HEMATOCRIT: CPT | Performed by: INTERNAL MEDICINE

## 2022-06-04 PROCEDURE — 82607 VITAMIN B-12: CPT | Performed by: INTERNAL MEDICINE

## 2022-06-04 PROCEDURE — 83550 IRON BINDING TEST: CPT | Performed by: INTERNAL MEDICINE

## 2022-06-04 PROCEDURE — 80048 BASIC METABOLIC PNL TOTAL CA: CPT | Performed by: INTERNAL MEDICINE

## 2022-06-04 PROCEDURE — 30233N1 TRANSFUSION OF NONAUTOLOGOUS RED BLOOD CELLS INTO PERIPHERAL VEIN, PERCUTANEOUS APPROACH: ICD-10-PCS | Performed by: INTERNAL MEDICINE

## 2022-06-04 PROCEDURE — 36430 TRANSFUSION BLD/BLD COMPNT: CPT

## 2022-06-04 PROCEDURE — 83540 ASSAY OF IRON: CPT | Performed by: INTERNAL MEDICINE

## 2022-06-04 PROCEDURE — 85025 COMPLETE CBC W/AUTO DIFF WBC: CPT | Performed by: INTERNAL MEDICINE

## 2022-06-04 PROCEDURE — 82746 ASSAY OF FOLIC ACID SERUM: CPT | Performed by: INTERNAL MEDICINE

## 2022-06-04 PROCEDURE — 85045 AUTOMATED RETICULOCYTE COUNT: CPT | Performed by: INTERNAL MEDICINE

## 2022-06-04 PROCEDURE — 85018 HEMOGLOBIN: CPT | Performed by: INTERNAL MEDICINE

## 2022-06-04 PROCEDURE — 82962 GLUCOSE BLOOD TEST: CPT

## 2022-06-04 PROCEDURE — 90935 HEMODIALYSIS ONE EVALUATION: CPT | Performed by: INTERNAL MEDICINE

## 2022-06-04 RX ORDER — ROSUVASTATIN CALCIUM 10 MG/1
10 TABLET, COATED ORAL NIGHTLY
Status: DISCONTINUED | OUTPATIENT
Start: 2022-06-04 | End: 2022-06-05

## 2022-06-04 RX ORDER — GABAPENTIN 300 MG/1
300 CAPSULE ORAL 3 TIMES DAILY
Status: DISCONTINUED | OUTPATIENT
Start: 2022-06-04 | End: 2022-06-05

## 2022-06-04 RX ORDER — MIDODRINE HYDROCHLORIDE 5 MG/1
5 TABLET ORAL AS NEEDED
Status: DISCONTINUED | OUTPATIENT
Start: 2022-06-04 | End: 2022-06-05

## 2022-06-04 RX ORDER — LOSARTAN POTASSIUM 100 MG/1
100 TABLET ORAL DAILY
Status: DISCONTINUED | OUTPATIENT
Start: 2022-06-04 | End: 2022-06-05

## 2022-06-04 RX ORDER — SODIUM CHLORIDE 9 MG/ML
INJECTION, SOLUTION INTRAVENOUS ONCE
Status: COMPLETED | OUTPATIENT
Start: 2022-06-04 | End: 2022-06-05

## 2022-06-04 RX ORDER — ALBUMIN (HUMAN) 12.5 G/50ML
100 SOLUTION INTRAVENOUS AS NEEDED
Status: DISCONTINUED | OUTPATIENT
Start: 2022-06-04 | End: 2022-06-05

## 2022-06-04 RX ORDER — AMLODIPINE BESYLATE 5 MG/1
10 TABLET ORAL DAILY
Status: DISCONTINUED | OUTPATIENT
Start: 2022-06-04 | End: 2022-06-05

## 2022-06-04 RX ORDER — CALCIUM ACETATE 667 MG/1
2 CAPSULE ORAL
Status: DISCONTINUED | OUTPATIENT
Start: 2022-06-04 | End: 2022-06-05

## 2022-06-04 RX ORDER — ROPINIROLE 1 MG/1
1 TABLET, FILM COATED ORAL NIGHTLY
Status: DISCONTINUED | OUTPATIENT
Start: 2022-06-04 | End: 2022-06-05

## 2022-06-04 RX ORDER — FOLIC ACID 1 MG/1
1 TABLET ORAL DAILY
Status: DISCONTINUED | OUTPATIENT
Start: 2022-06-04 | End: 2022-06-05

## 2022-06-04 RX ORDER — LIDOCAINE AND PRILOCAINE 25; 25 MG/G; MG/G
CREAM TOPICAL AS NEEDED
Status: DISCONTINUED | OUTPATIENT
Start: 2022-06-04 | End: 2022-06-05

## 2022-06-04 RX ORDER — DOCUSATE SODIUM 100 MG/1
100 CAPSULE, LIQUID FILLED ORAL 2 TIMES DAILY
Status: DISCONTINUED | OUTPATIENT
Start: 2022-06-04 | End: 2022-06-05

## 2022-06-04 RX ORDER — TRAZODONE HYDROCHLORIDE 50 MG/1
50 TABLET ORAL NIGHTLY
Status: DISCONTINUED | OUTPATIENT
Start: 2022-06-04 | End: 2022-06-05

## 2022-06-04 RX ORDER — ESCITALOPRAM OXALATE 20 MG/1
20 TABLET ORAL DAILY
Status: DISCONTINUED | OUTPATIENT
Start: 2022-06-04 | End: 2022-06-05

## 2022-06-04 RX ORDER — ALBUTEROL SULFATE 90 UG/1
2 AEROSOL, METERED RESPIRATORY (INHALATION) EVERY 6 HOURS PRN
Status: DISCONTINUED | OUTPATIENT
Start: 2022-06-04 | End: 2022-06-05

## 2022-06-04 NOTE — PLAN OF CARE
Received patient asleep. On room air. Breath sounds clear. On tele, SR. No c/o pain. Consult called to Dr Sissy Jules, heme/onc and informed that hemoglobin this morning is 6.8 with order received for 1 unit PRBCs. Lap sites closed with skin glue.

## 2022-06-04 NOTE — PROGRESS NOTES
VSS afebrile. PRBC completed without complication. Patient tolerated it well. [facility] contacted and scheduled to have dialysis this afternoon.

## 2022-06-04 NOTE — PLAN OF CARE
NURSING ADMISSION NOTE      Patient admitted via Cart  Oriented to room. Safety precautions initiated. Bed in low position. Call light in reach. Pt arrived to unit in stable condition. Pt denies any pain. On RA, NSR via tele. Bowel sounds active. poc discussed with pt and pt's daughter, both verbalized understanding. Pt resting in bed call light within reach. Safety precautions in place.      0020- hemoglobin recheck was 7.1, hospitalist notified, no new orders

## 2022-06-04 NOTE — ED QUICK NOTES
Orders for admission, patient is aware of plan and ready to go upstairs. Any questions, please call ED RN jewel at extension [phone].      Patient Covid vaccination status: Fully vaccinated     COVID Test Ordered in ED: Rapid SARS-CoV-2 by PCR    COVID Suspicion at Admission: N/A    Running Infusions:  None    Mental Status/LOC at time of transport: aox4    Other pertinent information:   CIWA score: N/A   NIH score:  N/A

## 2022-06-05 VITALS
BODY MASS INDEX: 25.76 KG/M2 | RESPIRATION RATE: 16 BRPM | OXYGEN SATURATION: 93 % | SYSTOLIC BLOOD PRESSURE: 142 MMHG | WEIGHT: 170 LBS | HEIGHT: 68 IN | DIASTOLIC BLOOD PRESSURE: 65 MMHG | HEART RATE: 66 BPM | TEMPERATURE: 99 F

## 2022-06-05 LAB
BASOPHILS # BLD AUTO: 0 X10(3) UL (ref 0–0.2)
BASOPHILS NFR BLD AUTO: 0 %
BLOOD TYPE BARCODE: 6200
EOSINOPHIL # BLD AUTO: 0.15 X10(3) UL (ref 0–0.7)
EOSINOPHIL NFR BLD AUTO: 3.3 %
ERYTHROCYTE [DISTWIDTH] IN BLOOD BY AUTOMATED COUNT: 18.5 %
GLUCOSE BLD-MCNC: 107 MG/DL (ref 70–99)
GLUCOSE BLD-MCNC: 127 MG/DL (ref 70–99)
HBV SURFACE AG SER-ACNC: <0.1 [IU]/L
HBV SURFACE AG SERPL QL IA: NONREACTIVE
HCT VFR BLD AUTO: 24.7 %
HGB BLD-MCNC: 8.4 G/DL
IMM GRANULOCYTES # BLD AUTO: 0.02 X10(3) UL (ref 0–1)
IMM GRANULOCYTES NFR BLD: 0.4 %
LYMPHOCYTES # BLD AUTO: 1.03 X10(3) UL (ref 1–4)
LYMPHOCYTES NFR BLD AUTO: 22.9 %
MCH RBC QN AUTO: 32.1 PG (ref 26–34)
MCHC RBC AUTO-ENTMCNC: 34 G/DL (ref 31–37)
MCV RBC AUTO: 94.3 FL
MONOCYTES # BLD AUTO: 0.5 X10(3) UL (ref 0.1–1)
MONOCYTES NFR BLD AUTO: 11.1 %
NEUTROPHILS # BLD AUTO: 2.8 X10 (3) UL (ref 1.5–7.7)
NEUTROPHILS # BLD AUTO: 2.8 X10(3) UL (ref 1.5–7.7)
NEUTROPHILS NFR BLD AUTO: 62.3 %
PLATELET # BLD AUTO: 70 10(3)UL (ref 150–450)
RBC # BLD AUTO: 2.62 X10(6)UL
WBC # BLD AUTO: 4.5 X10(3) UL (ref 4–11)

## 2022-06-05 PROCEDURE — 85025 COMPLETE CBC W/AUTO DIFF WBC: CPT | Performed by: HOSPITALIST

## 2022-06-05 PROCEDURE — 82962 GLUCOSE BLOOD TEST: CPT

## 2022-06-05 PROCEDURE — 87340 HEPATITIS B SURFACE AG IA: CPT | Performed by: INTERNAL MEDICINE

## 2022-06-05 NOTE — PLAN OF CARE
Iv removed, telemetry removed, discharge instructions given with verbal understanding. Patient to be transported by wheelchair by transport. Dr. Mckenna Harley ok with discharge. Discharged home with daughter.      Problem: Patient/Family Goals  Goal: Patient/Family Long Term Goal  Description: Patient's Long Term Goal:     Interventions:  -   - See additional Care Plan goals for specific interventions  6/5/2022 1340 by Faviola Sevilla RN  Outcome: Adequate for Discharge  6/5/2022 1217 by Faviola Sevilla RN  Outcome: Progressing  Goal: Patient/Family Short Term Goal  Description: Patient's Short Term Goal:     Interventions:   -   - See additional Care Plan goals for specific interventions  6/5/2022 1340 by Faviola Sevilla, RN  Outcome: Adequate for Discharge  6/5/2022 1217 by Faviola Sevilla, RN  Outcome: Progressing

## 2022-06-05 NOTE — PLAN OF CARE
Assumed care of patient at 0700. A/o x3   Denies Chest pain, sob, Lightheadedness, dizziness. Hopeful to be discharge home today. Trending hgb levels. Both daughters updated on poc.       Problem: Patient/Family Goals  Goal: Patient/Family Long Term Goal  Description: Patient's Long Term Goal:     Interventions:  -   - See additional Care Plan goals for specific interventions  Outcome: Progressing  Goal: Patient/Family Short Term Goal  Description: Patient's Short Term Goal:     Interventions:   -  - See additional Care Plan goals for specific interventions  Outcome: Progressing

## 2022-06-05 NOTE — PROGRESS NOTES
Patient alert and oriented x4. On room air. Denies pain at this time. LT arm precautions. Hbg rechecked at 2004, which is 8.8. No coverage for 2100 ACHS. Pt refused to have his IV removed and changed, blood on dressing noted. Will continue to monitor.

## 2022-06-06 ENCOUNTER — PATIENT OUTREACH (OUTPATIENT)
Dept: CASE MANAGEMENT | Age: 82
End: 2022-06-06

## 2022-06-06 NOTE — PROGRESS NOTES
MAITE spoke with Emmett Burrell daughter, not on HIPAA, states she just left for work and requested CB tomorrow morning, since patient is home. MAITE will attempt to call back tomorrow.

## 2022-06-07 NOTE — PROGRESS NOTES
MAITE spoke with Emmett Burrell daughter, not on HIPAA, states her father is in dialysis at this time. He will be available after 3pm today. MAITE will try calling back later.

## 2022-06-16 LAB
ABSOLUTE BASOPHILS: 31 CELLS/UL (ref 0–200)
ABSOLUTE EOSINOPHILS: 50 CELLS/UL (ref 15–500)
ABSOLUTE LYMPHOCYTES: 1110 CELLS/UL (ref 850–3900)
ABSOLUTE MONOCYTES: 546 CELLS/UL (ref 200–950)
ABSOLUTE NEUTROPHILS: 1364 CELLS/UL (ref 1500–7800)
ALBUMIN/GLOBULIN RATIO: 1.6 (CALC) (ref 1–2.5)
ALBUMIN: 4.2 G/DL (ref 3.6–5.1)
ALKALINE PHOSPHATASE: 108 U/L (ref 35–144)
ALT: 9 U/L (ref 9–46)
AST: 10 U/L (ref 10–35)
BASOPHILS: 1 %
BILIRUBIN, TOTAL: 0.4 MG/DL (ref 0.2–1.2)
BUN/CREATININE RATIO: 7 (CALC) (ref 6–22)
BUN: 44 MG/DL (ref 7–25)
CALCIUM: 9.8 MG/DL (ref 8.6–10.3)
CARBON DIOXIDE: 33 MMOL/L (ref 20–32)
CHLORIDE: 97 MMOL/L (ref 98–110)
CHOL/HDLC RATIO: 2 (CALC)
CHOLESTEROL, TOTAL: 101 MG/DL
CREATININE: 6.59 MG/DL (ref 0.7–1.11)
EGFR IF AFRICN AM: 8 ML/MIN/1.73M2
EGFR IF NONAFRICN AM: 7 ML/MIN/1.73M2
EOSINOPHILS: 1.6 %
GLOBULIN: 2.6 G/DL (CALC) (ref 1.9–3.7)
GLUCOSE: 115 MG/DL (ref 65–99)
HDL CHOLESTEROL: 50 MG/DL
HEMATOCRIT: 32 % (ref 38.5–50)
HEMOGLOBIN A1C: 4.9 % OF TOTAL HGB
HEMOGLOBIN: 10.4 G/DL (ref 13.2–17.1)
LDL-CHOLESTEROL: 36 MG/DL (CALC)
LYMPHOCYTES: 35.8 %
MCH: 32.5 PG (ref 27–33)
MCHC: 32.5 G/DL (ref 32–36)
MCV: 100 FL (ref 80–100)
MONOCYTES: 17.6 %
MPV: 8.7 FL (ref 7.5–12.5)
NEUTROPHILS: 44 %
NON-HDL CHOLESTEROL: 51 MG/DL (CALC)
PLATELET COUNT: 152 THOUSAND/UL (ref 140–400)
POTASSIUM: 4.9 MMOL/L (ref 3.5–5.3)
PROTEIN, TOTAL: 6.8 G/DL (ref 6.1–8.1)
PSA, TOTAL: 2.69 NG/ML
RDW: 17.8 % (ref 11–15)
RED BLOOD CELL COUNT: 3.2 MILLION/UL (ref 4.2–5.8)
SODIUM: 140 MMOL/L (ref 135–146)
TRIGLYCERIDES: 71 MG/DL
TSH W/REFLEX TO FT4: 2.04 MIU/L (ref 0.4–4.5)
WHITE BLOOD CELL COUNT: 3.1 THOUSAND/UL (ref 3.8–10.8)

## 2022-07-05 ENCOUNTER — PATIENT MESSAGE (OUTPATIENT)
Dept: FAMILY MEDICINE CLINIC | Facility: CLINIC | Age: 82
End: 2022-07-05

## 2022-07-06 RX ORDER — FOLIC ACID 1 MG/1
1 TABLET ORAL DAILY
Qty: 90 TABLET | Refills: 1 | Status: SHIPPED | OUTPATIENT
Start: 2022-07-06

## 2022-07-06 RX ORDER — ESCITALOPRAM OXALATE 20 MG/1
TABLET ORAL
Qty: 90 TABLET | Refills: 1 | Status: SHIPPED | OUTPATIENT
Start: 2022-07-06

## 2022-07-06 NOTE — TELEPHONE ENCOUNTER
From: Sina Jules  To: Loren Alford MD  Sent: 7/5/2022 2:49 PM CDT  Subject: Physical therapy    Hello, can you please refer my dad to a physical therapist. He is again complaining of sciatic type pain.    Thank you

## 2022-07-06 NOTE — TELEPHONE ENCOUNTER
TERRENCE.  Dr. Valerie Johnson- last clinical documentation and PT referral 8/2021. If you would like to addend previous OV note, last seen for DM 5/2022 and order PT please send back to clinical. Thank you.

## 2022-07-06 NOTE — TELEPHONE ENCOUNTER
folic acid 1 MG Oral Tab 90 tablet 1 1/17/2022     escitalopram 20 MG Oral Tab 30 tablet 3 2/4/2022     LOV 5/16/2022    Future Appointments   Date Time Provider James Jensen   7/11/2022 11:45 AM Sarah Rodriges MD [facility]

## 2022-07-08 NOTE — TELEPHONE ENCOUNTER
Noted   Future Appointments   Date Time Provider James Jensen   7/11/2022 11:45 AM Galilea Medina MD [facility]

## 2022-07-11 ENCOUNTER — OFFICE VISIT (OUTPATIENT)
Dept: FAMILY MEDICINE CLINIC | Facility: CLINIC | Age: 82
End: 2022-07-11
Payer: MEDICAID

## 2022-07-11 ENCOUNTER — E-ADVICE (OUTPATIENT)
Dept: CARDIOLOGY | Age: 82
End: 2022-07-11

## 2022-07-11 ENCOUNTER — PATIENT MESSAGE (OUTPATIENT)
Dept: FAMILY MEDICINE CLINIC | Facility: CLINIC | Age: 82
End: 2022-07-11

## 2022-07-11 VITALS
BODY MASS INDEX: 25.46 KG/M2 | WEIGHT: 168 LBS | HEART RATE: 65 BPM | SYSTOLIC BLOOD PRESSURE: 172 MMHG | OXYGEN SATURATION: 95 % | TEMPERATURE: 97 F | DIASTOLIC BLOOD PRESSURE: 70 MMHG | HEIGHT: 68 IN | RESPIRATION RATE: 16 BRPM

## 2022-07-11 DIAGNOSIS — M54.32 SCIATICA OF LEFT SIDE: ICD-10-CM

## 2022-07-11 DIAGNOSIS — R51.9 NONINTRACTABLE HEADACHE, UNSPECIFIED CHRONICITY PATTERN, UNSPECIFIED HEADACHE TYPE: ICD-10-CM

## 2022-07-11 DIAGNOSIS — I10 ESSENTIAL HYPERTENSION: Primary | ICD-10-CM

## 2022-07-11 PROBLEM — R52 PAIN, UNSPECIFIED: Status: ACTIVE | Noted: 2021-12-24

## 2022-07-11 PROCEDURE — 99214 OFFICE O/P EST MOD 30 MIN: CPT | Performed by: FAMILY MEDICINE

## 2022-07-11 PROCEDURE — 3077F SYST BP >= 140 MM HG: CPT | Performed by: FAMILY MEDICINE

## 2022-07-11 PROCEDURE — 3008F BODY MASS INDEX DOCD: CPT | Performed by: FAMILY MEDICINE

## 2022-07-11 PROCEDURE — 3078F DIAST BP <80 MM HG: CPT | Performed by: FAMILY MEDICINE

## 2022-07-11 RX ORDER — LORAZEPAM 0.5 MG/1
0.5 TABLET ORAL NIGHTLY PRN
Qty: 10 TABLET | Refills: 0 | Status: SHIPPED | OUTPATIENT
Start: 2022-07-11

## 2022-07-11 RX ORDER — HYDRALAZINE HYDROCHLORIDE 10 MG/1
TABLET, FILM COATED ORAL
COMMUNITY
Start: 2022-07-05 | End: 2022-07-11

## 2022-07-11 RX ORDER — HYDRALAZINE HYDROCHLORIDE 25 MG/1
25 TABLET, FILM COATED ORAL 3 TIMES DAILY
Qty: 90 TABLET | Refills: 1 | Status: SHIPPED | OUTPATIENT
Start: 2022-07-11

## 2022-07-11 NOTE — TELEPHONE ENCOUNTER
From: Bari Up  To: Torrie Collado MD  Sent: 7/11/2022 12:41 PM CDT  Subject: medication question    Hello, I forgot to ask. do you think we should give my dad Ativan or Xanax as prn for anxiety/stress?

## 2022-07-11 NOTE — TELEPHONE ENCOUNTER
Dr Leslie Qureshi,   Please advise on what pt should take Xanax or Ativan for anxiety.  Pt will need rx sent to pharm   Thank you Brandi Quesada

## 2022-07-11 NOTE — TELEPHONE ENCOUNTER
We can definitely try, low-dose Ativan at bedtime.   But not every day  Am sending a prescription of 10 pills to your pharmacy on file

## 2022-07-13 DIAGNOSIS — M54.41 CHRONIC RIGHT-SIDED LOW BACK PAIN WITH RIGHT-SIDED SCIATICA: ICD-10-CM

## 2022-07-13 DIAGNOSIS — G89.29 CHRONIC RIGHT-SIDED LOW BACK PAIN WITH RIGHT-SIDED SCIATICA: ICD-10-CM

## 2022-07-14 ENCOUNTER — TELEPHONE (OUTPATIENT)
Dept: FAMILY MEDICINE CLINIC | Facility: CLINIC | Age: 82
End: 2022-07-14

## 2022-07-14 RX ORDER — GABAPENTIN 300 MG/1
300 CAPSULE ORAL 3 TIMES DAILY
Qty: 270 CAPSULE | Refills: 1 | Status: SHIPPED | OUTPATIENT
Start: 2022-07-14

## 2022-08-09 ENCOUNTER — OFFICE VISIT (OUTPATIENT)
Dept: PHYSICAL THERAPY | Facility: HOSPITAL | Age: 82
End: 2022-08-09
Attending: FAMILY MEDICINE
Payer: MEDICAID

## 2022-08-09 PROCEDURE — 97163 PT EVAL HIGH COMPLEX 45 MIN: CPT | Performed by: PHYSICAL THERAPIST

## 2022-08-09 PROCEDURE — 97110 THERAPEUTIC EXERCISES: CPT | Performed by: PHYSICAL THERAPIST

## 2022-08-10 NOTE — PATIENT INSTRUCTIONS
HOME EXERCISE PROGRAM [[id number]]    LOWER TRUNK ROTATIONS - LTR - WIG WAGS - KNEE ROCKS -  Repeat 10 Times, Hold 1 Second(s), Complete 1 Set, Perform 1 Times a Day    BRIDGING -  Repeat 10 Times, Hold 1 Second(s), Complete 1 Set, Perform 1 Times a Day    SIDE LYING TRUNK ROTATION - MODIFIED -  Repeat 10 Times, Hold 1 Second(s), Complete 1 Set, Perform 1 Times a Day    SCAPULAR RETRACTIONS -  Repeat 10 Times, Hold 1 Second(s), Complete 1 Set, Perform 3 Times a Day

## 2022-08-11 ENCOUNTER — OFFICE VISIT (OUTPATIENT)
Dept: PHYSICAL THERAPY | Facility: HOSPITAL | Age: 82
End: 2022-08-11
Attending: FAMILY MEDICINE
Payer: MEDICAID

## 2022-08-11 PROCEDURE — 97110 THERAPEUTIC EXERCISES: CPT | Performed by: PHYSICAL THERAPIST

## 2022-08-11 NOTE — PROGRESS NOTES
Larina Seats:   Sciatica of left side (M54.32)      Referring Provider: Pat Spivey of Evaluation:    8/9/22    Precautions:  Fall Risk Next MD visit:   none scheduled  Date of Surgery: n/a   Insurance Primary/Secondary: BLUE CROSS MEDICAID / N/A     # Auth Visits: 12            Subjective: feel good. Had dialysis today so I am a little tired. Pain: 0/10      Objective: seen for first treatment . Assessment: Was able to complete majority of exercise without sig. Fatigue and able to not take rests . Goals:   1  Patient will demonstrate lumbar flexion within available range, without losing balance and  with no pain within 6 weeks in order to pick objects from the floor. 2. Patient will tolerate walking for >1 block  with no increase in pain within 6 weeks to improve community involvement. 3. Patient will demonstrate proper squat form with no increase in pain within 6 weeks in order to lift objects from low surfaces. 4. Patient will demonstrate improvement in FOTO score equal or greater than VALERIY within 6 weeks in order to improve functional mobility. 5. Patient will have subjective pain 2/10   6. Patient will be independent with HEP       Plan: Progress with strength and ROM , endurance and flexible   Date: 8/11/2022  TX#: 2/12 Date:                 TX#: 3/ Date:                 TX#: 4/ Date:                 TX#: 5/ Date:    Tx#: 6/   NuStep x5' res 3        TE:  -Supine SB   LBR   Bridge x20   DKTC   Hooklying RS multiple bouts     Bent knee fall outs x20 red TB   Manual traction le right x4 reps 15 sec hold   -         Standing TE:   Balance board x2' each   SLB x 5 reps 5 sec each   Airex toe raise>squat x10   Side steps x2 x10 no TB               HEP: see patient instructions     Charges: 3 TE        Total Timed Treatment: 45 min  Total Treatment Time: 45 min

## 2022-08-16 ENCOUNTER — OFFICE VISIT (OUTPATIENT)
Dept: PHYSICAL THERAPY | Facility: HOSPITAL | Age: 82
End: 2022-08-16
Attending: FAMILY MEDICINE
Payer: MEDICAID

## 2022-08-16 PROCEDURE — 97110 THERAPEUTIC EXERCISES: CPT | Performed by: PHYSICAL THERAPIST

## 2022-08-16 NOTE — PROGRESS NOTES
Bernardine Radish:   Sciatica of left side (M54.32)      Referring Provider: Lurline Dancer of Evaluation:    8/9/22    Precautions:  Fall Risk Next MD visit:   none scheduled  Date of Surgery: n/a   Insurance Primary/Secondary: BLUE CROSS MEDICAID / N/A     # Auth Visits: 12            Subjective: Little if any leg pain . Leg pain only continues to come on when I am really tired and walking a lot. Pain: 0/10      Objective: seen for treatment . Fatigue at end of session . Assessment: Progresses with focus on strength, endurance and CKC . Gait with SPC. Goals:   1  Patient will demonstrate lumbar flexion within available range, without losing balance and  with no pain within 6 weeks in order to pick objects from the floor. 2. Patient will tolerate walking for >1 block  with no increase in pain within 6 weeks to improve community involvement. 3. Patient will demonstrate proper squat form with no increase in pain within 6 weeks in order to lift objects from low surfaces. 4. Patient will demonstrate improvement in FOTO score equal or greater than VALERIY within 6 weeks in order to improve functional mobility. 5. Patient will have subjective pain 2/10   6. Patient will be independent with HEP       Plan: Progress with strength and ROM , endurance and flexible   Date: 8/11/2022  TX#: 2/12 Date:                 TX#: 3/ Date:                 TX#: 4/ Date:                 TX#: 5/ Date:    Tx#: 6/   NuStep x5' res 3  NuStep x5' res 3       TE:  -Supine SB   LBR   Bridge x20   DKTC   Hooklying RS multiple bouts     Bent knee fall outs x20 red TB   Manual traction le right x4 reps 15 sec hold   -   TE:  -Supine SB   LBR   Bridge x20   DKTC   Hooklying RS multiple bouts   Bent knee fall outs x20 red TB      Standing TE:   Balance board x2' each   SLB x 5 reps 5 sec each   Airex toe raise>squat x10   Side steps x2 x10 no TB  Standing TE:   Balance board x2' each   SLB x 5 reps 5 sec each on airex   Tandem walk x2 laps in // bars FT hold    Airex toe raise>squat x10   Side steps x2 x10 no TB              HEP: see patient instructions     Charges: 3 TE        Total Timed Treatment: 45 min  Total Treatment Time: 45 min

## 2022-08-18 ENCOUNTER — OFFICE VISIT (OUTPATIENT)
Dept: PHYSICAL THERAPY | Facility: HOSPITAL | Age: 82
End: 2022-08-18
Attending: FAMILY MEDICINE
Payer: MEDICAID

## 2022-08-18 DIAGNOSIS — G25.81 RLS (RESTLESS LEGS SYNDROME): ICD-10-CM

## 2022-08-18 PROCEDURE — 97110 THERAPEUTIC EXERCISES: CPT | Performed by: PHYSICAL THERAPIST

## 2022-08-18 RX ORDER — ROPINIROLE 0.5 MG/1
1 TABLET, FILM COATED ORAL NIGHTLY
Qty: 180 TABLET | Refills: 0 | Status: SHIPPED | OUTPATIENT
Start: 2022-08-18

## 2022-08-18 NOTE — PROGRESS NOTES
[facility]PITAL[facility]  Progress Note     [facility] Patient Status:  [facility]    10/3/1940 MRN [id number]   [facility] Attending Aaron Amaya MD   Hosp Day # 1 PCP Sean Feliciano MD     Chief Complaint: cough    S: Patient feels well • docusate sodium  100 mg Oral BID   • escitalopram  20 mg Oral QAM   • losartan  100 mg Oral Daily   • Rosuvastatin Calcium  10 mg Oral Nightly   • gabapentin  300 mg Oral TID   • epoetin armando-epbx  10,000 Units Intravenous Once in dialysis   • sigrid Unknown

## 2022-08-19 RX ORDER — ROSUVASTATIN CALCIUM 10 MG/1
10 TABLET, COATED ORAL NIGHTLY
Qty: 90 TABLET | Refills: 1 | Status: SHIPPED | OUTPATIENT
Start: 2022-08-19

## 2022-08-19 NOTE — TELEPHONE ENCOUNTER
LOV 7/11/2022        LAST LAB 6/15/22    LAST RX  rosuvastatin 10 MG Oral Tab 90 tablet 1 12/22/2021         Next OV   Future Appointments   Date Time Provider James Jensen   8/23/2022  6:15 PM Molly Reed, PT [facility] AT [facility]   8/25/2022  6:15 PM Molly Reed, PT [facility] PHYS [facility] AT [facility]   8/30/2022  6:15 PM Molly Reed, PT [facility] PHYS [facility] AT [facility]   9/1/2022  6:15 PM Molly Reed, PT [facility] PHYS [address]         PROTOCOL PASS

## 2022-08-22 NOTE — PROGRESS NOTES
Barnett Fret:   Sciatica of left side (M54.32)      Referring Provider: Earnest Mandel of Evaluation:    8/9/22    Precautions:  Fall Risk Next MD visit:   none scheduled  Date of Surgery: n/a   Insurance Primary/Secondary: BLUE CROSS MEDICAID / N/A     # Auth Visits: 12            Subjective: still get tired when I am walking long distances. Pain: 0/10      Objective: seen for treatment . Assessment: Able to complete full exercise without requiring rests. Goals:   1  Patient will demonstrate lumbar flexion within available range, without losing balance and  with no pain within 6 weeks in order to pick objects from the floor. 2. Patient will tolerate walking for >1 block  with no increase in pain within 6 weeks to improve community involvement. 3. Patient will demonstrate proper squat form with no increase in pain within 6 weeks in order to lift objects from low surfaces. 4. Patient will demonstrate improvement in FOTO score equal or greater than VALERIY within 6 weeks in order to improve functional mobility. 5. Patient will have subjective pain 2/10   6. Patient will be independent with HEP       Plan: Progress with strength and ROM , endurance 2xwk x4 weeks    Date: 8/11/2022  TX#: 2/12 Date:                 TX#: 3/ Date:          8/18/22       TX#: 4/ Date:                 TX#: 5/ Date:    Tx#: 6/   NuStep x5' res 3  NuStep x5' res 3  NuStep x5' res 3      TE:  -Supine SB   LBR   Bridge x20   DKTC   Hooklying RS multiple bouts     Bent knee fall outs x20 red TB   Manual traction le right x4 reps 15 sec hold   -   TE:  -Supine SB   LBR   Bridge x20   DKTC   Hooklying RS multiple bouts   Bent knee fall outs x20 red TB TE:  -Supine SB   LBR   Bridge x20   DKTC   Hooklying RS multiple bouts   Bent knee fall outs x20 red TB     Standing TE:   Balance board x2' each   SLB x 5 reps 5 sec each   Airex toe raise>squat x10   Side steps x2 x10 no TB  Standing TE:   Balance board x2' each   SLB x 5 reps 5 sec each on airex   Tandem walk x2 laps in // bars FT hold    Airex toe raise>squat x10   Side steps x2 x10 no TB  Standing TE:   Balance board x2' each   SLB x 5 reps 5 sec each on airex   Tandem walk x2 laps in // bars FT hold  On airex   Side steps x2 laps on airex mats   Airex toe raise>squat x10   Side steps x2 x10 no TB            HEP: see patient instructions     Charges: 3 TE        Total Timed Treatment: 45 min  Total Treatment Time: 45 min

## 2022-08-23 ENCOUNTER — OFFICE VISIT (OUTPATIENT)
Dept: PHYSICAL THERAPY | Facility: HOSPITAL | Age: 82
End: 2022-08-23
Attending: FAMILY MEDICINE
Payer: MEDICAID

## 2022-08-23 PROCEDURE — 97110 THERAPEUTIC EXERCISES: CPT | Performed by: PHYSICAL THERAPIST

## 2022-08-24 NOTE — PROGRESS NOTES
Jesse Gallagher:   Sciatica of left side (M54.32)      Referring Provider: Volney Sandifer of Evaluation:    8/9/22    Precautions:  Fall Risk Next MD visit:   none scheduled  Date of Surgery: n/a   Insurance Primary/Secondary: BLUE CROSS MEDICAID / N/A     # Auth Visits: 12            Subjective: have been having pain in my right knee, tired jessica at the end of the day . Still notes pain in his low back without any radicular symptoms with standing and extended walking . Pain: 0/10      Objective: seen for treatment . HS length 45 b, - SLR . Assessment: Progressing with minimal pain in low back and minimal radicular symptoms if any. HS length is demonstrating Improvement       Goals:   1  Patient will demonstrate lumbar flexion within available range, without losing balance and  with no pain within 6 weeks in order to pick objects from the floor. 2. Patient will tolerate walking for >1 block  with no increase in pain within 6 weeks to improve community involvement. 3. Patient will demonstrate proper squat form with no increase in pain within 6 weeks in order to lift objects from low surfaces. 4. Patient will demonstrate improvement in FOTO score equal or greater than VALERIY within 6 weeks in order to improve functional mobility. 5. Patient will have subjective pain 2/10   6. Patient will be independent with HEP       Plan: Progress with strength and ROM , endurance 2xwk x4 weeks    Date: 8/11/2022  TX#: 2/12 Date:                 TX#: 3/ Date:          8/18/22       TX#: 4/ Date:            8/23/22     TX#: 5/8 Date:    Tx#: 6/   NuStep x5' res 3  NuStep x5' res 3  NuStep x5' res 3  NuStep x5' res 3     TE:  -Supine SB   LBR   Bridge x20   DKTC   Hooklying RS multiple bouts     Bent knee fall outs x20 red TB   Manual traction le right x4 reps 15 sec hold   -   TE:  -Supine SB   LBR   Bridge x20   DKTC   Hooklying RS multiple bouts   Bent knee fall outs x20 red TB TE:  -Supine SB   LBR   Bridge x20   DKTC Hooklying RS multiple bouts   Bent knee fall outs x20 red TB TE:  -Supine SB   LBR   Bridge x20   DKTC   Hooklying RS multiple bouts   Bent knee fall outs x20 red TB    MT:   HS stretch B x2 @30 sec each   Manual leg pulls x4 @30 sec each     Standing TE:   Balance board x2' each   SLB x 5 reps 5 sec each   Airex toe raise>squat x10   Side steps x2 x10 no TB  Standing TE:   Balance board x2' each   SLB x 5 reps 5 sec each on airex   Tandem walk x2 laps in // bars FT hold    Airex toe raise>squat x10   Side steps x2 x10 no TB  Standing TE:   Balance board x2' each   SLB x 5 reps 5 sec each on airex   Tandem walk x2 laps in // bars FT hold  On airex   Side steps x2 laps on airex mats   Airex toe raise>squat x10   Side steps x2 x10 no TB Standing TE:   Balance board x2' each   SLB x 5 reps 5 sec each on airex   Tandem walk x2 laps in // bars FT hold  On airex   Side steps x2 laps on airex mats   Slant board x3 @30 sec each            HEP: see patient instructions     Charges: 3 TE        Total Timed Treatment: 45 min  Total Treatment Time: 45 min

## 2022-08-25 ENCOUNTER — OFFICE VISIT (OUTPATIENT)
Dept: PHYSICAL THERAPY | Facility: HOSPITAL | Age: 82
End: 2022-08-25
Attending: FAMILY MEDICINE
Payer: MEDICAID

## 2022-08-25 PROCEDURE — 97110 THERAPEUTIC EXERCISES: CPT | Performed by: PHYSICAL THERAPIST

## 2022-08-28 NOTE — PROGRESS NOTES
Richardbyron Elm:   Sciatica of left side (M54.32)      Referring Provider: Jaspreet Sullivancel of Evaluation:    8/9/22    Precautions:  Fall Risk Next MD visit:   none scheduled  Date of Surgery: n/a   Insurance Primary/Secondary: BLUE CROSS MEDICAID / N/A     # Auth Visits: 12            Subjective: no new complaints  . Pain: 0/10      Objective: seen for treatment . Required rest between exercise today . Assessment: Hamstring length continues to improve. Endurance a little less today but responds well to current treatment focus of CKC and balance, exercise. Goals:   1  Patient will demonstrate lumbar flexion within available range, without losing balance and  with no pain within 6 weeks in order to pick objects from the floor. 2. Patient will tolerate walking for >1 block  with no increase in pain within 6 weeks to improve community involvement. 3. Patient will demonstrate proper squat form with no increase in pain within 6 weeks in order to lift objects from low surfaces. 4. Patient will demonstrate improvement in FOTO score equal or greater than VALERIY within 6 weeks in order to improve functional mobility. 5. Patient will have subjective pain 2/10   6.  Patient will be independent with HEP       Plan: Progress with strength and ROM , endurance 2xwk x4 weeks    Date: 8/11/2022  TX#: 2/12 Date:                 TX#: 3/ Date:          8/18/22       TX#: 4/ Date:            8/23/22     TX#: 5/8 Date: 8/25/22  Tx#: 6/   NuStep x5' res 3  NuStep x5' res 3  NuStep x5' res 3  NuStep x5' res 3  NuStep x5' res 3    TE:  -Supine SB   LBR   Bridge x20   DKTC   Hooklying RS multiple bouts     Bent knee fall outs x20 red TB   Manual traction le right x4 reps 15 sec hold   -   TE:  -Supine SB   LBR   Bridge x20   DKTC   Hooklying RS multiple bouts   Bent knee fall outs x20 red TB TE:  -Supine SB   LBR   Bridge x20   DKTC   Hooklying RS multiple bouts   Bent knee fall outs x20 red TB TE:  -Supine SB   LBR   Bridge x20 DKTC   Hooklying RS multiple bouts   Bent knee fall outs x20 red TB    MT:   HS stretch B x2 @30 sec each   Manual leg pulls x4 @30 sec each  TE:  -Supine SB   LBR   Bridge x20   DKTC   Hooklying RS multiple bouts   Bent knee fall outs x20 red TBMT:   HS stretch B x2 @30 sec each   Manual leg pulls x4 @30 sec each     Standing TE:   Balance board x2' each   SLB x 5 reps 5 sec each   Airex toe raise>squat x10   Side steps x2 x10 no TB  Standing TE:   Balance board x2' each   SLB x 5 reps 5 sec each on airex   Tandem walk x2 laps in // bars FT hold    Airex toe raise>squat x10   Side steps x2 x10 no TB  Standing TE:   Balance board x2' each   SLB x 5 reps 5 sec each on airex   Tandem walk x2 laps in // bars FT hold  On airex   Side steps x2 laps on airex mats   Airex toe raise>squat x10   Side steps x2 x10 no TB Standing TE:   Balance board x2' each   SLB x 5 reps 5 sec each on airex   Tandem walk x2 laps in // bars FT hold  On airex   Side steps x2 laps on airex mats   Slant board x3 @30 sec each  Standing TE:   Balance board x2' each   SLB x 5 reps 5 sec each on airex   Tandem walk x2 laps in // bars FT hold  On airex   Side steps x2 laps on airex mats   Slant board x3 @30 sec each           HEP: see patient instructions     Charges: 3 TE        Total Timed Treatment: 45 min  Total Treatment Time: 45 min

## 2022-08-30 ENCOUNTER — APPOINTMENT (OUTPATIENT)
Dept: PHYSICAL THERAPY | Facility: HOSPITAL | Age: 82
End: 2022-08-30
Attending: FAMILY MEDICINE
Payer: MEDICAID

## 2022-09-01 ENCOUNTER — OFFICE VISIT (OUTPATIENT)
Dept: PHYSICAL THERAPY | Facility: HOSPITAL | Age: 82
End: 2022-09-01
Attending: FAMILY MEDICINE
Payer: MEDICAID

## 2022-09-01 PROCEDURE — 97110 THERAPEUTIC EXERCISES: CPT | Performed by: PHYSICAL THERAPIST

## 2022-09-01 NOTE — PROGRESS NOTES
Alan Spain:   Sciatica of left side (M54.32)      Referring Provider: Fariba Delgado of Evaluation:    8/9/22    Precautions:  Fall Risk Next MD visit:   none scheduled  Date of Surgery: n/a   Insurance Primary/Secondary: BLUE CROSS MEDICAID / N/A     # Auth Visits: 12            Subjective:I feel really good overall . Still have the leg pain but it isn't as bad as it was. Just get tired. Pain: 0/10      Objective: seen for treatment . Required rest between exercise today . LE   Hip flexion (L2): R 3+/5; L 3+/5  Hip abduction: R 3+/5; L 3+/5  Hip Extension: R 3+/5; L 3+/5   Hip ER: R 3+/5; L 3+/5  Hip IR: R 3+/5; L 3+/5  Knee Flexion: R 3+/5; L 3+/5   Knee extension (L3): R 3+/5; L 3+/5   DF (L4): R 3+/5; L 3+/5  Great Toe Ext (L5): R 3+/5, L 3+/5  PF (S1): R 3+/5; L 3+/5   Balance SLS 5 sec left without finger tips   Right 5 sec     Assessment: Is progressing very well with current treatment plan ,and is noticing an overall improvement jessica with endurance. His gait remains with a SPC, but has not lost balance since PT started. Goals:   1  Patient will demonstrate lumbar flexion within available range, without losing balance and  with no pain within 6 weeks in order to pick objects from the floor. 2. Patient will tolerate walking for >1 block  with no increase in pain within 6 weeks to improve community involvement. 3. Patient will demonstrate proper squat form with no increase in pain within 6 weeks in order to lift objects from low surfaces. 4. Patient will demonstrate improvement in FOTO score equal or greater than VALERIY within 6 weeks in order to improve functional mobility. 5. Patient will have subjective pain 2/10   6.  Patient will be independent with HEP       Plan: Progress with strength and ROM , endurance 2xwk x4 weeks     Date: 8/11/2022  TX#: 2/12 Date:                 TX#: 3/ Date:          8/18/22       TX#: 4/ Date:            8/23/22     TX#: 5/8 Date: 8/25/22  Tx#: 6/ Date: 9/1/22  Tx#: 6/   NuStep x5' res 3  NuStep x5' res 3  NuStep x5' res 3  NuStep x5' res 3  NuStep x5' res 3  NuStep x5' res 3   TE:  -Supine SB   LBR   Bridge x20   DKTC   Hooklying RS multiple bouts     Bent knee fall outs x20 red TB   Manual traction le right x4 reps 15 sec hold   -   TE:  -Supine SB   LBR   Bridge x20   DKTC   Hooklying RS multiple bouts   Bent knee fall outs x20 red TB TE:  -Supine SB   LBR   Bridge x20   DKTC   Hooklying RS multiple bouts   Bent knee fall outs x20 red TB TE:  -Supine SB   LBR   Bridge x20   DKTC   Hooklying RS multiple bouts   Bent knee fall outs x20 red TB    MT:   HS stretch B x2 @30 sec each   Manual leg pulls x4 @30 sec each  TE:  -Supine SB   LBR   Bridge x20   DKTC   Hooklying RS multiple bouts   Bent knee fall outs x20 red TBMT:   HS stretch B x2 @30 sec each   Manual leg pulls x4 @30 sec each   TE:  -Supine SB   LBR   Bridge x20   DKTC   Hooklying RS multiple bouts   Bent knee fall outs x20 red TBMT:   HS stretch B x2 @30 sec each   Manual leg pulls x4 @30 sec each   Standing TE:   Balance board x2' each   SLB x 5 reps 5 sec each   Airex toe raise>squat x10   Side steps x2 x10 no TB  Standing TE:   Balance board x2' each   SLB x 5 reps 5 sec each on airex   Tandem walk x2 laps in // bars FT hold    Airex toe raise>squat x10   Side steps x2 x10 no TB  Standing TE:   Balance board x2' each   SLB x 5 reps 5 sec each on airex   Tandem walk x2 laps in // bars FT hold  On airex   Side steps x2 laps on airex mats   Airex toe raise>squat x10   Side steps x2 x10 no TB Standing TE:   Balance board x2' each   SLB x 5 reps 5 sec each on airex   Tandem walk x2 laps in // bars FT hold  On airex   Side steps x2 laps on airex mats   Slant board x3 @30 sec each  Standing TE:   Balance board x2' each   SLB x 5 reps 5 sec each on airex   Tandem walk x2 laps in // bars FT hold  On airex   Side steps x2 laps on airex mats   Slant board x3 @30 sec each  Standing TE:   Balance board x2' each   SLB x 5 reps 5 sec each on airex   Tandem walk x2 laps in // bars FT hold  On airex   Side steps x2 laps on airex mats   Slant board x3 @30 sec each            HEP: see patient instructions     Charges: 3 TE        Total Timed Treatment: 45 min  Total Treatment Time: 45 min

## 2022-10-10 ENCOUNTER — APPOINTMENT (OUTPATIENT)
Dept: GENERAL RADIOLOGY | Facility: HOSPITAL | Age: 82
End: 2022-10-10
Attending: EMERGENCY MEDICINE
Payer: MEDICAID

## 2022-10-10 ENCOUNTER — TELEMEDICINE (OUTPATIENT)
Dept: TELEHEALTH | Age: 82
End: 2022-10-10

## 2022-10-10 ENCOUNTER — HOSPITAL ENCOUNTER (EMERGENCY)
Facility: HOSPITAL | Age: 82
Discharge: HOME | End: 2022-10-10
Attending: EMERGENCY MEDICINE
Payer: MEDICAID

## 2022-10-10 ENCOUNTER — APPOINTMENT (OUTPATIENT)
Dept: CARDIOLOGY | Age: 82
End: 2022-10-10

## 2022-10-10 VITALS
SYSTOLIC BLOOD PRESSURE: 157 MMHG | OXYGEN SATURATION: 100 % | WEIGHT: 175 LBS | RESPIRATION RATE: 16 BRPM | TEMPERATURE: 99 F | HEIGHT: 69 IN | DIASTOLIC BLOOD PRESSURE: 69 MMHG | HEART RATE: 72 BPM | BODY MASS INDEX: 25.92 KG/M2

## 2022-10-10 DIAGNOSIS — B97.89 VIRAL RESPIRATORY INFECTION: Primary | ICD-10-CM

## 2022-10-10 DIAGNOSIS — J98.8 VIRAL RESPIRATORY INFECTION: Primary | ICD-10-CM

## 2022-10-10 DIAGNOSIS — Z02.9 ENCOUNTERS FOR ADMINISTRATIVE PURPOSES: Primary | ICD-10-CM

## 2022-10-10 LAB
FLUAV + FLUBV RNA SPEC NAA+PROBE: NEGATIVE
FLUAV + FLUBV RNA SPEC NAA+PROBE: NEGATIVE
RSV RNA SPEC NAA+PROBE: NEGATIVE
SARS-COV-2 RNA RESP QL NAA+PROBE: NOT DETECTED

## 2022-10-10 PROCEDURE — 99284 EMERGENCY DEPT VISIT MOD MDM: CPT

## 2022-10-10 PROCEDURE — 0241U SARS-COV-2/FLU A AND B/RSV BY PCR (GENEXPERT): CPT | Performed by: EMERGENCY MEDICINE

## 2022-10-10 PROCEDURE — 71045 X-RAY EXAM CHEST 1 VIEW: CPT | Performed by: EMERGENCY MEDICINE

## 2022-10-10 PROCEDURE — 99283 EMERGENCY DEPT VISIT LOW MDM: CPT

## 2022-10-10 RX ORDER — BENZONATATE 100 MG/1
100 CAPSULE ORAL 3 TIMES DAILY PRN
Qty: 30 CAPSULE | Refills: 0 | Status: SHIPPED | OUTPATIENT
Start: 2022-10-10 | End: 2022-11-09

## 2022-10-10 NOTE — ED INITIAL ASSESSMENT (HPI)
Pt reports cough, nasal congestion, sore throat x 4 days. No vomiting. Temp at home 99. 3. also reports left leg pain

## 2022-10-19 RX ORDER — CINACALCET 30 MG/1
1 TABLET, FILM COATED ORAL
COMMUNITY
Start: 2022-07-11

## 2022-10-19 RX ORDER — CINACALCET 30 MG/1
TABLET, FILM COATED ORAL
COMMUNITY
Start: 2022-09-15 | End: 2022-10-24

## 2022-10-19 RX ORDER — HYDRALAZINE HYDROCHLORIDE 10 MG/1
TABLET, FILM COATED ORAL
COMMUNITY
Start: 2022-09-29

## 2022-10-19 RX ORDER — ROPINIROLE 1 MG/1
1 TABLET, FILM COATED ORAL NIGHTLY
COMMUNITY
Start: 2022-08-19

## 2022-10-24 ENCOUNTER — OFFICE VISIT (OUTPATIENT)
Dept: FAMILY MEDICINE CLINIC | Facility: CLINIC | Age: 82
End: 2022-10-24
Payer: MEDICAID

## 2022-10-24 ENCOUNTER — TELEPHONE (OUTPATIENT)
Dept: FAMILY MEDICINE CLINIC | Facility: CLINIC | Age: 82
End: 2022-10-24

## 2022-10-24 VITALS
SYSTOLIC BLOOD PRESSURE: 120 MMHG | TEMPERATURE: 98 F | WEIGHT: 167 LBS | BODY MASS INDEX: 24.73 KG/M2 | HEART RATE: 76 BPM | DIASTOLIC BLOOD PRESSURE: 50 MMHG | HEIGHT: 69 IN | OXYGEN SATURATION: 97 % | RESPIRATION RATE: 16 BRPM

## 2022-10-24 DIAGNOSIS — R05.3 CHRONIC COUGH: Primary | ICD-10-CM

## 2022-10-24 PROBLEM — E87.70 FLUID OVERLOAD, UNSPECIFIED: Status: ACTIVE | Noted: 2022-08-16

## 2022-10-24 LAB
CONTROL LINE PRESENT WITH A CLEAR BACKGROUND (YES/NO): YES YES/NO
KIT LOT #: NORMAL NUMERIC
STREP GRP A CUL-SCR: NEGATIVE

## 2022-11-13 DIAGNOSIS — G47.00 INSOMNIA, UNSPECIFIED TYPE: ICD-10-CM

## 2022-11-13 RX ORDER — ESCITALOPRAM OXALATE 20 MG/1
TABLET ORAL
Qty: 90 TABLET | Refills: 1 | Status: CANCELLED | OUTPATIENT
Start: 2022-11-13

## 2022-11-15 RX ORDER — FOLIC ACID 1 MG/1
1 TABLET ORAL DAILY
Qty: 90 TABLET | Refills: 1 | Status: SHIPPED | OUTPATIENT
Start: 2022-11-15

## 2022-11-15 RX ORDER — TRAZODONE HYDROCHLORIDE 50 MG/1
50 TABLET ORAL NIGHTLY
Qty: 90 TABLET | Refills: 1 | Status: SHIPPED | OUTPATIENT
Start: 2022-11-15

## 2022-11-29 ENCOUNTER — EKG ENCOUNTER (OUTPATIENT)
Dept: LAB | Facility: HOSPITAL | Age: 82
End: 2022-11-29
Attending: SURGERY
Payer: MEDICAID

## 2022-11-29 ENCOUNTER — LAB ENCOUNTER (OUTPATIENT)
Dept: LAB | Facility: HOSPITAL | Age: 82
End: 2022-11-29
Attending: SURGERY
Payer: MEDICAID

## 2022-11-29 ENCOUNTER — HOSPITAL ENCOUNTER (OUTPATIENT)
Dept: ULTRASOUND IMAGING | Facility: HOSPITAL | Age: 82
Discharge: HOME | End: 2022-11-29
Attending: SURGERY
Payer: MEDICAID

## 2022-11-29 DIAGNOSIS — Z99.2 ESRD ON HEMODIALYSIS (HCC): ICD-10-CM

## 2022-11-29 DIAGNOSIS — T82.318A BREAKDOWN (MECHANICAL) OF OTHER VASCULAR GRAFTS, INITIAL ENCOUNTER (HCC): ICD-10-CM

## 2022-11-29 DIAGNOSIS — N18.6 ESRD ON HEMODIALYSIS (HCC): ICD-10-CM

## 2022-11-29 DIAGNOSIS — Z01.818 PRE-OP TESTING: ICD-10-CM

## 2022-11-29 DIAGNOSIS — Z20.822 ENCOUNTER FOR PREOPERATIVE SCREENING LABORATORY TESTING FOR COVID-19 VIRUS: ICD-10-CM

## 2022-11-29 DIAGNOSIS — Z01.812 ENCOUNTER FOR PREOPERATIVE SCREENING LABORATORY TESTING FOR COVID-19 VIRUS: ICD-10-CM

## 2022-11-29 DIAGNOSIS — N18.6 END STAGE RENAL DISEASE (HCC): ICD-10-CM

## 2022-11-29 LAB
ALBUMIN SERPL-MCNC: 3.6 G/DL (ref 3.4–5)
ALBUMIN/GLOB SERPL: 0.9 {RATIO} (ref 1–2)
ALP LIVER SERPL-CCNC: 89 U/L
ALT SERPL-CCNC: 18 U/L
ANION GAP SERPL CALC-SCNC: 4 MMOL/L (ref 0–18)
APTT PPP: 30.3 SECONDS (ref 23.3–35.6)
AST SERPL-CCNC: 15 U/L (ref 15–37)
BASOPHILS # BLD AUTO: 0.03 X10(3) UL (ref 0–0.2)
BASOPHILS NFR BLD AUTO: 1.3 %
BILIRUB SERPL-MCNC: 0.5 MG/DL (ref 0.1–2)
BUN BLD-MCNC: 29 MG/DL (ref 7–18)
CALCIUM BLD-MCNC: 9.5 MG/DL (ref 8.5–10.1)
CHLORIDE SERPL-SCNC: 103 MMOL/L (ref 98–112)
CO2 SERPL-SCNC: 29 MMOL/L (ref 21–32)
CREAT BLD-MCNC: 4.44 MG/DL
EOSINOPHIL # BLD AUTO: 0.16 X10(3) UL (ref 0–0.7)
EOSINOPHIL NFR BLD AUTO: 6.7 %
ERYTHROCYTE [DISTWIDTH] IN BLOOD BY AUTOMATED COUNT: 15.7 %
FASTING STATUS PATIENT QL REPORTED: NO
GFR SERPLBLD BASED ON 1.73 SQ M-ARVRAT: 13 ML/MIN/1.73M2 (ref 60–?)
GLOBULIN PLAS-MCNC: 3.9 G/DL (ref 2.8–4.4)
GLUCOSE BLD-MCNC: 144 MG/DL (ref 70–99)
HCT VFR BLD AUTO: 30.4 %
HGB BLD-MCNC: 10.3 G/DL
IMM GRANULOCYTES # BLD AUTO: 0 X10(3) UL (ref 0–1)
IMM GRANULOCYTES NFR BLD: 0 %
INR BLD: 1.03 (ref 0.85–1.16)
LYMPHOCYTES # BLD AUTO: 0.77 X10(3) UL (ref 1–4)
LYMPHOCYTES NFR BLD AUTO: 32.4 %
MCH RBC QN AUTO: 32.6 PG (ref 26–34)
MCHC RBC AUTO-ENTMCNC: 33.9 G/DL (ref 31–37)
MCV RBC AUTO: 96.2 FL
MONOCYTES # BLD AUTO: 0.42 X10(3) UL (ref 0.1–1)
MONOCYTES NFR BLD AUTO: 17.6 %
NEUTROPHILS # BLD AUTO: 1 X10 (3) UL (ref 1.5–7.7)
NEUTROPHILS # BLD AUTO: 1 X10(3) UL (ref 1.5–7.7)
NEUTROPHILS NFR BLD AUTO: 42 %
OSMOLALITY SERPL CALC.SUM OF ELEC: 290 MOSM/KG (ref 275–295)
PLATELET # BLD AUTO: 126 10(3)UL (ref 150–450)
POTASSIUM SERPL-SCNC: 4.3 MMOL/L (ref 3.5–5.1)
PROT SERPL-MCNC: 7.5 G/DL (ref 6.4–8.2)
PROTHROMBIN TIME: 13.5 SECONDS (ref 11.6–14.8)
RBC # BLD AUTO: 3.16 X10(6)UL
SODIUM SERPL-SCNC: 136 MMOL/L (ref 136–145)
WBC # BLD AUTO: 2.4 X10(3) UL (ref 4–11)

## 2022-11-29 PROCEDURE — 93010 ELECTROCARDIOGRAM REPORT: CPT | Performed by: INTERNAL MEDICINE

## 2022-11-29 PROCEDURE — 85610 PROTHROMBIN TIME: CPT

## 2022-11-29 PROCEDURE — 85025 COMPLETE CBC W/AUTO DIFF WBC: CPT

## 2022-11-29 PROCEDURE — 93005 ELECTROCARDIOGRAM TRACING: CPT

## 2022-11-29 PROCEDURE — 80053 COMPREHEN METABOLIC PANEL: CPT

## 2022-11-29 PROCEDURE — 93971 EXTREMITY STUDY: CPT | Performed by: SURGERY

## 2022-11-29 PROCEDURE — 36415 COLL VENOUS BLD VENIPUNCTURE: CPT

## 2022-11-29 PROCEDURE — 85730 THROMBOPLASTIN TIME PARTIAL: CPT

## 2022-11-30 LAB
ATRIAL RATE: 69 BPM
P AXIS: -12 DEGREES
P-R INTERVAL: 190 MS
Q-T INTERVAL: 432 MS
QRS DURATION: 126 MS
QTC CALCULATION (BEZET): 462 MS
R AXIS: -33 DEGREES
SARS-COV-2 RNA RESP QL NAA+PROBE: NOT DETECTED
T AXIS: 119 DEGREES
VENTRICULAR RATE: 69 BPM

## 2022-12-05 RX ORDER — LOSARTAN POTASSIUM 100 MG/1
100 TABLET ORAL DAILY
Qty: 90 TABLET | Refills: 1 | Status: SHIPPED | OUTPATIENT
Start: 2022-12-05

## 2022-12-05 RX ORDER — AMLODIPINE BESYLATE 10 MG/1
10 TABLET ORAL DAILY
Qty: 90 TABLET | Refills: 1 | Status: SHIPPED | OUTPATIENT
Start: 2022-12-05

## 2022-12-07 ENCOUNTER — LAB ENCOUNTER (OUTPATIENT)
Dept: LAB | Age: 82
End: 2022-12-07
Attending: SURGERY
Payer: MEDICAID

## 2022-12-07 DIAGNOSIS — Z20.822 ENCOUNTER FOR PREOPERATIVE SCREENING LABORATORY TESTING FOR COVID-19 VIRUS: ICD-10-CM

## 2022-12-07 DIAGNOSIS — Z01.812 ENCOUNTER FOR PREOPERATIVE SCREENING LABORATORY TESTING FOR COVID-19 VIRUS: ICD-10-CM

## 2022-12-08 LAB — SARS-COV-2 RNA RESP QL NAA+PROBE: NOT DETECTED

## 2022-12-13 ENCOUNTER — ANESTHESIA EVENT (OUTPATIENT)
Dept: CARDIAC SURGERY | Facility: HOSPITAL | Age: 82
End: 2022-12-13
Payer: MEDICAID

## 2022-12-14 ENCOUNTER — HOSPITAL ENCOUNTER (OUTPATIENT)
Facility: HOSPITAL | Age: 82
Setting detail: HOSPITAL OUTPATIENT SURGERY
Discharge: HOME | End: 2022-12-14
Attending: SURGERY | Admitting: SURGERY
Payer: MEDICAID

## 2022-12-14 ENCOUNTER — ANESTHESIA (OUTPATIENT)
Dept: CARDIAC SURGERY | Facility: HOSPITAL | Age: 82
End: 2022-12-14
Payer: MEDICAID

## 2022-12-14 VITALS
TEMPERATURE: 98 F | OXYGEN SATURATION: 91 % | RESPIRATION RATE: 14 BRPM | WEIGHT: 170 LBS | HEIGHT: 69 IN | SYSTOLIC BLOOD PRESSURE: 174 MMHG | BODY MASS INDEX: 25.18 KG/M2 | HEART RATE: 62 BPM | DIASTOLIC BLOOD PRESSURE: 75 MMHG

## 2022-12-14 DIAGNOSIS — Z01.818 PRE-OP TESTING: ICD-10-CM

## 2022-12-14 DIAGNOSIS — Z01.812 ENCOUNTER FOR PREOPERATIVE SCREENING LABORATORY TESTING FOR COVID-19 VIRUS: ICD-10-CM

## 2022-12-14 DIAGNOSIS — Z99.2 ESRD ON HEMODIALYSIS (HCC): Primary | ICD-10-CM

## 2022-12-14 DIAGNOSIS — Z20.822 ENCOUNTER FOR PREOPERATIVE SCREENING LABORATORY TESTING FOR COVID-19 VIRUS: ICD-10-CM

## 2022-12-14 DIAGNOSIS — N18.6 ESRD ON HEMODIALYSIS (HCC): Primary | ICD-10-CM

## 2022-12-14 LAB
ANION GAP SERPL CALC-SCNC: 4 MMOL/L (ref 0–18)
BUN BLD-MCNC: 55 MG/DL (ref 7–18)
CALCIUM BLD-MCNC: 9.4 MG/DL (ref 8.5–10.1)
CHLORIDE SERPL-SCNC: 103 MMOL/L (ref 98–112)
CO2 SERPL-SCNC: 30 MMOL/L (ref 21–32)
CREAT BLD-MCNC: 5.94 MG/DL
GFR SERPLBLD BASED ON 1.73 SQ M-ARVRAT: 9 ML/MIN/1.73M2 (ref 60–?)
GLUCOSE BLD-MCNC: 101 MG/DL (ref 70–99)
GLUCOSE BLD-MCNC: 120 MG/DL (ref 70–99)
GLUCOSE BLD-MCNC: 129 MG/DL (ref 70–99)
OSMOLALITY SERPL CALC.SUM OF ELEC: 301 MOSM/KG (ref 275–295)
POTASSIUM SERPL-SCNC: 4.5 MMOL/L (ref 3.5–5.1)
SARS-COV-2 RNA RESP QL NAA+PROBE: NOT DETECTED
SODIUM SERPL-SCNC: 137 MMOL/L (ref 136–145)

## 2022-12-14 PROCEDURE — 82962 GLUCOSE BLOOD TEST: CPT

## 2022-12-14 PROCEDURE — 03170JD BYPASS RIGHT BRACHIAL ARTERY TO UPPER ARM VEIN WITH SYNTHETIC SUBSTITUTE, OPEN APPROACH: ICD-10-PCS | Performed by: SURGERY

## 2022-12-14 PROCEDURE — 80048 BASIC METABOLIC PNL TOTAL CA: CPT | Performed by: SURGERY

## 2022-12-14 DEVICE — PROPATEN VASCULAR GRAFT SW 4-6MMX45CM TAPERED HEPARIN
Type: IMPLANTABLE DEVICE | Site: ARM | Status: FUNCTIONAL
Brand: GORE PROPATEN VASCULAR GRAFT

## 2022-12-14 RX ORDER — AMLODIPINE BESYLATE 10 MG/1
10 TABLET ORAL DAILY
Status: DISCONTINUED | OUTPATIENT
Start: 2022-12-14 | End: 2022-12-14

## 2022-12-14 RX ORDER — LOSARTAN POTASSIUM 100 MG/1
100 TABLET ORAL DAILY
Status: DISCONTINUED | OUTPATIENT
Start: 2022-12-14 | End: 2022-12-14

## 2022-12-14 RX ORDER — HEPARIN SODIUM 5000 [USP'U]/ML
5000 INJECTION, SOLUTION INTRAVENOUS; SUBCUTANEOUS ONCE
Status: DISCONTINUED | OUTPATIENT
Start: 2022-12-14 | End: 2022-12-14 | Stop reason: HOSPADM

## 2022-12-14 RX ORDER — HYDRALAZINE HYDROCHLORIDE 20 MG/ML
5 INJECTION INTRAMUSCULAR; INTRAVENOUS ONCE
Status: DISCONTINUED | OUTPATIENT
Start: 2022-12-14 | End: 2022-12-14

## 2022-12-14 RX ORDER — CISATRACURIUM BESYLATE 2 MG/ML
INJECTION INTRAVENOUS AS NEEDED
Status: DISCONTINUED | OUTPATIENT
Start: 2022-12-14 | End: 2022-12-14 | Stop reason: SURG

## 2022-12-14 RX ORDER — HYDROMORPHONE HYDROCHLORIDE 1 MG/ML
INJECTION, SOLUTION INTRAMUSCULAR; INTRAVENOUS; SUBCUTANEOUS
Status: COMPLETED
Start: 2022-12-14 | End: 2022-12-14

## 2022-12-14 RX ORDER — ACETAMINOPHEN AND CODEINE PHOSPHATE 300; 30 MG/1; MG/1
2 TABLET ORAL ONCE AS NEEDED
Status: COMPLETED | OUTPATIENT
Start: 2022-12-14 | End: 2022-12-14

## 2022-12-14 RX ORDER — HEPARIN SODIUM 1000 [USP'U]/ML
INJECTION, SOLUTION INTRAVENOUS; SUBCUTANEOUS AS NEEDED
Status: DISCONTINUED | OUTPATIENT
Start: 2022-12-14 | End: 2022-12-14 | Stop reason: SURG

## 2022-12-14 RX ORDER — GLYCOPYRROLATE 0.2 MG/ML
INJECTION, SOLUTION INTRAMUSCULAR; INTRAVENOUS AS NEEDED
Status: DISCONTINUED | OUTPATIENT
Start: 2022-12-14 | End: 2022-12-14 | Stop reason: SURG

## 2022-12-14 RX ORDER — SODIUM CHLORIDE 9 MG/ML
INJECTION, SOLUTION INTRAVENOUS CONTINUOUS PRN
Status: DISCONTINUED | OUTPATIENT
Start: 2022-12-14 | End: 2022-12-14 | Stop reason: SURG

## 2022-12-14 RX ORDER — CEFAZOLIN SODIUM/WATER 2 G/20 ML
SYRINGE (ML) INTRAVENOUS AS NEEDED
Status: DISCONTINUED | OUTPATIENT
Start: 2022-12-14 | End: 2022-12-14 | Stop reason: SURG

## 2022-12-14 RX ORDER — SODIUM CHLORIDE 9 MG/ML
INJECTION, SOLUTION INTRAVENOUS CONTINUOUS
Status: DISCONTINUED | OUTPATIENT
Start: 2022-12-14 | End: 2022-12-14

## 2022-12-14 RX ORDER — HYDROMORPHONE HYDROCHLORIDE 1 MG/ML
0.4 INJECTION, SOLUTION INTRAMUSCULAR; INTRAVENOUS; SUBCUTANEOUS EVERY 5 MIN PRN
Status: DISCONTINUED | OUTPATIENT
Start: 2022-12-14 | End: 2022-12-14

## 2022-12-14 RX ORDER — NALOXONE HYDROCHLORIDE 0.4 MG/ML
80 INJECTION, SOLUTION INTRAMUSCULAR; INTRAVENOUS; SUBCUTANEOUS AS NEEDED
Status: DISCONTINUED | OUTPATIENT
Start: 2022-12-14 | End: 2022-12-14

## 2022-12-14 RX ORDER — INSULIN ASPART 100 [IU]/ML
INJECTION, SOLUTION INTRAVENOUS; SUBCUTANEOUS ONCE
Status: DISCONTINUED | OUTPATIENT
Start: 2022-12-14 | End: 2022-12-14

## 2022-12-14 RX ORDER — NEOSTIGMINE METHYLSULFATE 1 MG/ML
INJECTION, SOLUTION INTRAVENOUS AS NEEDED
Status: DISCONTINUED | OUTPATIENT
Start: 2022-12-14 | End: 2022-12-14 | Stop reason: SURG

## 2022-12-14 RX ORDER — HYDROMORPHONE HYDROCHLORIDE 1 MG/ML
0.2 INJECTION, SOLUTION INTRAMUSCULAR; INTRAVENOUS; SUBCUTANEOUS EVERY 5 MIN PRN
Status: DISCONTINUED | OUTPATIENT
Start: 2022-12-14 | End: 2022-12-14

## 2022-12-14 RX ORDER — BUPIVACAINE HYDROCHLORIDE 5 MG/ML
INJECTION, SOLUTION EPIDURAL; INTRACAUDAL AS NEEDED
Status: DISCONTINUED | OUTPATIENT
Start: 2022-12-14 | End: 2022-12-14 | Stop reason: HOSPADM

## 2022-12-14 RX ORDER — ACETAMINOPHEN AND CODEINE PHOSPHATE 300; 30 MG/1; MG/1
1 TABLET ORAL ONCE AS NEEDED
Status: COMPLETED | OUTPATIENT
Start: 2022-12-14 | End: 2022-12-14

## 2022-12-14 RX ORDER — PROTAMINE SULFATE 10 MG/ML
INJECTION, SOLUTION INTRAVENOUS AS NEEDED
Status: DISCONTINUED | OUTPATIENT
Start: 2022-12-14 | End: 2022-12-14 | Stop reason: SURG

## 2022-12-14 RX ORDER — LIDOCAINE HYDROCHLORIDE 10 MG/ML
INJECTION, SOLUTION EPIDURAL; INFILTRATION; INTRACAUDAL; PERINEURAL AS NEEDED
Status: DISCONTINUED | OUTPATIENT
Start: 2022-12-14 | End: 2022-12-14 | Stop reason: SURG

## 2022-12-14 RX ORDER — DEXTROSE MONOHYDRATE 25 G/50ML
50 INJECTION, SOLUTION INTRAVENOUS
Status: DISCONTINUED | OUTPATIENT
Start: 2022-12-14 | End: 2022-12-14

## 2022-12-14 RX ORDER — ACETAMINOPHEN 500 MG
1000 TABLET ORAL ONCE AS NEEDED
Status: COMPLETED | OUTPATIENT
Start: 2022-12-14 | End: 2022-12-14

## 2022-12-14 RX ORDER — HYDROCODONE BITARTRATE AND ACETAMINOPHEN 5; 325 MG/1; MG/1
1 TABLET ORAL EVERY 6 HOURS PRN
Status: DISCONTINUED | OUTPATIENT
Start: 2022-12-14 | End: 2022-12-14

## 2022-12-14 RX ORDER — NICOTINE POLACRILEX 4 MG
15 LOZENGE BUCCAL
Status: DISCONTINUED | OUTPATIENT
Start: 2022-12-14 | End: 2022-12-14

## 2022-12-14 RX ORDER — ONDANSETRON 2 MG/ML
4 INJECTION INTRAMUSCULAR; INTRAVENOUS EVERY 6 HOURS PRN
Status: DISCONTINUED | OUTPATIENT
Start: 2022-12-14 | End: 2022-12-14

## 2022-12-14 RX ORDER — METOCLOPRAMIDE HYDROCHLORIDE 5 MG/ML
5 INJECTION INTRAMUSCULAR; INTRAVENOUS EVERY 8 HOURS PRN
Status: DISCONTINUED | OUTPATIENT
Start: 2022-12-14 | End: 2022-12-14

## 2022-12-14 RX ORDER — ROSUVASTATIN CALCIUM 10 MG/1
10 TABLET, COATED ORAL NIGHTLY
Status: DISCONTINUED | OUTPATIENT
Start: 2022-12-14 | End: 2022-12-14

## 2022-12-14 RX ORDER — ONDANSETRON 2 MG/ML
INJECTION INTRAMUSCULAR; INTRAVENOUS AS NEEDED
Status: DISCONTINUED | OUTPATIENT
Start: 2022-12-14 | End: 2022-12-14 | Stop reason: SURG

## 2022-12-14 RX ORDER — NICOTINE POLACRILEX 4 MG
30 LOZENGE BUCCAL
Status: DISCONTINUED | OUTPATIENT
Start: 2022-12-14 | End: 2022-12-14

## 2022-12-14 RX ORDER — HYDROMORPHONE HYDROCHLORIDE 1 MG/ML
0.6 INJECTION, SOLUTION INTRAMUSCULAR; INTRAVENOUS; SUBCUTANEOUS EVERY 5 MIN PRN
Status: DISCONTINUED | OUTPATIENT
Start: 2022-12-14 | End: 2022-12-14

## 2022-12-14 RX ORDER — HYDRALAZINE HYDROCHLORIDE 25 MG/1
25 TABLET, FILM COATED ORAL EVERY 8 HOURS SCHEDULED
Status: DISCONTINUED | OUTPATIENT
Start: 2022-12-14 | End: 2022-12-14

## 2022-12-14 RX ADMIN — LIDOCAINE HYDROCHLORIDE 50 MG: 10 INJECTION, SOLUTION EPIDURAL; INFILTRATION; INTRACAUDAL; PERINEURAL at 09:28:00

## 2022-12-14 RX ADMIN — PROTAMINE SULFATE 25 MG: 10 INJECTION, SOLUTION INTRAVENOUS at 10:36:00

## 2022-12-14 RX ADMIN — SODIUM CHLORIDE: 9 INJECTION, SOLUTION INTRAVENOUS at 09:24:00

## 2022-12-14 RX ADMIN — SODIUM CHLORIDE: 9 INJECTION, SOLUTION INTRAVENOUS at 10:36:00

## 2022-12-14 RX ADMIN — CISATRACURIUM BESYLATE 8 MG: 2 INJECTION INTRAVENOUS at 09:28:00

## 2022-12-14 RX ADMIN — HEPARIN SODIUM 9000 UNITS: 1000 INJECTION, SOLUTION INTRAVENOUS; SUBCUTANEOUS at 09:58:00

## 2022-12-14 RX ADMIN — NEOSTIGMINE METHYLSULFATE 3 MG: 1 INJECTION, SOLUTION INTRAVENOUS at 11:13:00

## 2022-12-14 RX ADMIN — CISATRACURIUM BESYLATE 2 MG: 2 INJECTION INTRAVENOUS at 10:39:00

## 2022-12-14 RX ADMIN — GLYCOPYRROLATE 0.6 MG: 0.2 INJECTION, SOLUTION INTRAMUSCULAR; INTRAVENOUS at 11:13:00

## 2022-12-14 RX ADMIN — PROTAMINE SULFATE 10 MG: 10 INJECTION, SOLUTION INTRAVENOUS at 10:44:00

## 2022-12-14 RX ADMIN — ONDANSETRON 4 MG: 2 INJECTION INTRAMUSCULAR; INTRAVENOUS at 09:36:00

## 2022-12-14 RX ADMIN — CEFAZOLIN SODIUM/WATER 2 G: 2 G/20 ML SYRINGE (ML) INTRAVENOUS at 09:36:00

## 2022-12-14 NOTE — ANESTHESIA PROCEDURE NOTES
Airway  Date/Time: 12/14/2022 9:30 AM  Urgency: elective    Airway not difficult    General Information and Staff    Patient location during procedure: OR  Anesthesiologist: Saurabh Macias MD  Performed: anesthesiologist     Indications and Patient Condition  Indications for airway management: anesthesia  Sedation level: deep  Preoxygenated: yes  Patient position: sniffing  Mask difficulty assessment: 1 - vent by mask    Final Airway Details  Final airway type: endotracheal airway      Successful airway: ETT  Cuffed: yes   Successful intubation technique: Video laryngoscopy  Facilitating devices/methods: intubating stylet  Endotracheal tube insertion site: oral  Blade: GlideScope  Blade size: #3  ETT size (mm): 7.5    Cormack-Lehane Classification: grade I - full view of glottis  Placement verified by: chest auscultation and capnometry   Measured from: lips  ETT to lips (cm): 22  Number of attempts at approach: 1

## 2022-12-14 NOTE — ANESTHESIA POSTPROCEDURE EVALUATION
[address] Patient Status:  Hospital Outpatient Surgery   Age/Gender 81year old male MRN [id number]   Location [facility] Attending Marissa Low MD   Hosp Day # 0 PCP Jolly Perez MD       Anesthesia Post-op Note    CREATION OF RIGHT ARM ARTERIOVENOUS GRAFT    Procedure Summary     Date: 12/14/22 Room / Location: [address] [place] [address] 02 / [facility]    Anesthesia Start: 1461 Anesthesia Stop: 1132    Procedure: CREATION OF RIGHT ARM ARTERIOVENOUS GRAFT (Right: Upper Arm) Diagnosis: (end stage renal disease)    Surgeons: Marissa Low MD Anesthesiologist: Naa Rodrigez MD    Anesthesia Type: general ASA Status: 3          Anesthesia Type: general    Vitals Value Taken Time   /78 12/14/22 1133   Temp 98.1 12/14/22 1133   Pulse 71 12/14/22 1133   Resp 18 12/14/22 1133   SpO2 95 12/14/22 1133       Patient Location: PACU    Anesthesia Type: general    Airway Patency: patent and extubated    Mental Status: mildly sedated but able to meaningfully participate in the post-anesthesia evaluation    Nausea/Vomiting: none    Cardiopulmonary/Hydration status: stable euvolemic    Complications: no apparent anesthesia related complications    Postop vital signs: stable    Dental Exam: Unchanged from Preop    Patient to be discharged from PACU when criteria met.

## 2022-12-14 NOTE — DISCHARGE INSTRUCTIONS
Keep wound clean/ dry 10 days- no shower 10 days. Can remove dressing 4 days- paint incision with betadine/ cover dry gauze. Call office if temperature> 100. 5/ wound drainage. No driving 10 days.  No lifting more than 5 pounds right arm 4 weeks

## 2022-12-14 NOTE — OPERATIVE REPORT
Pre-op Dx: ESRD. Post-op Dx: Same. Procedure: Creation right brachial- axillary 4x6mm GTX graft fistula. Surgeon: Mercy Vickers.  EBL- 150cc

## 2022-12-14 NOTE — H&P
[facility]    PATIENT'S NAME: Molina Nichols   ATTENDING PHYSICIAN: Damaris Lara M.D. PATIENT ACCOUNT#:   [de-identified]    LOCATION:  [facility] CV[facility]  MEDICAL RECORD #:   [id number]       YOB: 1940  ADMISSION DATE:       12/14/2022    HISTORY AND PHYSICAL EXAMINATION    HISTORY OF PRESENT ILLNESS:  An 52-year-old [facility] [place] male who was seen in the office accompanied by his daughter who is a pharmacist, with an occluded left brachial artery-axillary prosthetic graft fistula placed about 2-1/2 years ago in [place] which currently is not working. It got infected. He has had multiple revisions, multiple endovascular procedures. The patient undergoes dialysis Tuesday, Thursday, and Saturday. Patient has been referred for new fistula placement and looking to do this in his right arm. He has a permacath for 6 months and used it prior to the fistula in his left arm. PAST MEDICAL HISTORY:  He has a history of a CVA back in 1996, probably related to hypertension, affecting the right side of his body. MEDICATIONS:  He is on aspirin 81 mg a day, midodrine 5 mg 3 times a day, insulin, hydralazine, Sensipar, Crestor, trazodone, amlodipine, gabapentin. ALLERGIES:  No known drug allergies. FAMILY HISTORY:  Coronary artery disease. SOCIAL HISTORY:  Wife passed away about 2 years ago from [facility]. He has 5 kids. No EtOH abuse, drug abuse, or tobacco abuse. He is retired. He used to be an  for an [employer]. REVIEW OF SYSTEMS:  Currently, no chest pain, no shortness of breath, no cerebrovascular symptoms. He denies any abdominal pain or back pain. No nausea, vomiting, hematemesis. No bright red blood per rectum. No hematuria, dysuria, hemoptysis, cough, sputum production. No weight loss. No fevers or chills. PHYSICAL EXAMINATION:    GENERAL:  He is awake, alert, appropriate. No acute distress.   VITAL SIGNS:  Blood pressure 136/70, heart rate 72, respirations 20. HEENT:  Pupils equal, round. Sclerae clear. Mouth without lesions. NECK:  No cervical bruit. No JVD. LUNGS:  Have breath sounds bilaterally. HEART:  Normal.  Questionable systolic ejection murmur, left sternal border. ABDOMEN:  Soft. No tenderness or masses. EXTREMITIES:  Fistula in the left arm is completely occluded, does not appear to be infected. Upper extremity pulses are palpable. Normal Pedro Pablo test in right arm. No gross veins seen. NEUROLOGIC:  He is intact. IMPRESSION:  Patient with end-stage renal disease based on hypertension and diabetes for at least 20 years, with a past history of cerebrovascular accident probably related to hypertension, for creation of a new fistula. Plan to place a fistula in the right arm. The procedure, risks, and complications were explained to the patient. His vein mapping shows no veins in the upper arm that are of any quality for the cephalic vein. Basilic vein may be adequate. Will look at the vein, but because he is 81years old, will probably place a prosthetic graft fistula. He is aware that there is risk of death, cardiac complications, bleeding, infection, arterial steal, venous hypertension with swelling of the arm, congestive heart failure, future thrombosis, future infection. The permacath may affect how the outflow of the graft works. He is aware of the surgery, the incisions, the hospital stay and the followup, and when the fistula can be used. Options for continuing with the permacath versus peritoneal dialysis were also explained. All questions answered for the patient and family.     Dictated By Anabela Vela M.D.  d: 12/14/2022 06:35:50  t: 12/14/2022 10:44:30  [facility] [id number]  [facility]/

## 2022-12-15 NOTE — OPERATIVE REPORT
[facility]    PATIENT'S NAME: Artemio Yang   ATTENDING PHYSICIAN: Amado Aguillon M.D. OPERATING PHYSICIAN: Amado Aguillon M.D. PATIENT ACCOUNT#:   [de-identified]    LOCATION:  [place] [address]  MEDICAL RECORD #:   [id number]       YOB: 1940  ADMISSION DATE:       12/14/2022      OPERATION DATE:  12/14/2022    OPERATIVE REPORT      PREOPERATIVE DIAGNOSIS:  End-stage renal disease, for creation of right arm fistula. POSTOPERATIVE DIAGNOSIS:  End-stage renal disease, for creation of right arm fistula, with small cephalic vein and adequate length of basilic vein. PROCEDURE:  Right brachial artery to distal brachial-basilic vein fistula 4 x 6 mm prosthetic graft. ASSISTANT:   Paulette Owens. INDICATIONS:  This is an 81-year-old [facility] [place] male for end-stage renal disease for creation of a fistula. He had a previous fistula in his left arm, had been infected, is currently not functioning. It was elected not to place a fistula in his left arm but to put a fistula, a new one on his right arm. Risks and complications were explained to the patient and family members of death, cardiac, complications, bleeding, infection, thrombosis, future thrombosis, future infection, infection, arterial steal with hand loss, venous hypertension, congestive heart failure. Risks and complications of medical management were explained. Risks and complications of a permacath versus peritoneal dialysis were explained. The patient understood and agreed. All questions answered. The patient is accompanied also by his daughter, his daughter is a pharmacist.    OPERATIVE TECHNIQUE:  Patient was placed supine on procedure table, underwent general anesthesia. The right arm was prepped and draped in usual sterile technique. After ultrasound was done, it showed that the cephalic vein was inadequate, the basilic vein did not appear to have enough length.   The patient had a normal Pedro Pablo test.  Good brachial artery at the antecubital fossa. The arm was prepped and draped with Lionel San to cover the operative field, receiving preoperative antibiotics. IV started by Surgery and with SCDs on both lower legs, a time-out was done. Incision was made over the brachial artery above the antecubital fossa and the axillary vein in the axilla. Dissection brought down to expose the brachial artery, was a good quality and this mobilized behind the nerve as well as the distal brachial vein, axillary vein. Brachial vein seemed to be better quality at this part. They both were isolated. A subcutaneous tunnel was created with a Alicia-Wick tunneler. The patient received appropriate amount of heparin. After greater than 5-minute period of time, the graft to 4 mm was trimmed, anastomosed to the brachial artery. The artery was controlled with a Bailey clamp proximally and profunda clamp distally. The graft was anastomosed with 6-0 Prolene suture. Prior to completion of this, all vessels were flushed and flow established down the arm with care being taken to prevent any embolization of air or debris. Hemostasis obtained with 6-0 Prolene suture. Clamp was placed on the graft just after the arterial anastomosis as the graft distended nicely, was trimmed, anastomosed to the area of the basilic-brachial vein proximally. The vein was controlled with a Bailey clamp proximally and profunda clamp distally. A venotomy was performed. Approximately 6 mm of the graft was trimmed appropriately and anastomosed with 6-0 Prolene suture. Prior to completion of this, all vessels were flushed, irrigated, and flow established through the fistula with care being taken to prevent any embolization of air or debris. Hemostasis was obtained with 6-0 Prolene suture. The patient required reversal of heparin with protamine Gelfoam and thrombin, FloSeal as well as Surgicel.   Final hemostasis was obtained, as it was kept on oozing through the needles. Once hemostasis obtained, the wounds were closed in layers with 2-0 Vicryl and then 3-0 Vicryl subcuticular. There area was anesthetized with 30 mL of 0.25% Marcaine plain. Doppler flow appreciated below the fistula going into the hand. Neurologically, he appeared to be intact. Vital signs remained stable. Estimated blood loss noted to be 100, 150 mL. Sponge, needle, and instrument counts were correct. Patient received 250 mL of crystalloid. FINAL DIAGNOSIS:  Creation of right brachial artery-axillary vein 4 x 6 mm prosthetic graft fistula.     Dictated By Jazmyn Gordon M.D.  d: 12/14/2022 11:18:16  t: 12/15/2022 05:32:11  Job [id number]  [facility]/

## 2022-12-16 NOTE — DISCHARGE SUMMARY
[facility]    PATIENT'S NAME: Orlandolurdes Andino   ATTENDING PHYSICIAN: Anabela Vela M.D. PATIENT ACCOUNT#:   [de-identified]    LOCATION:  [place] [address]  MEDICAL RECORD #:   [id number]       YOB: 1940  ADMISSION DATE:       12/14/2022      DISCHARGE DATE:  12/14/2022    DISCHARGE SUMMARY    ADMITTING DIAGNOSIS:  End-stage renal disease. HISTORY AND HOSPITAL COURSE:  Admitted to the hospital for creation of right arm fistula. Patient had a previous fistula in the left arm that got infected and currently not functioning. He tolerated procedure well. He had a right brachial artery axillary vein/basilic vein 4 x 6 mm Worthington-Reagan graft fistula. Instructed on wound care and activity. All questions answered.     Dictated By Anabela Vela M.D.  d: 12/14/2022 11:30:35  t: 12/16/2022 10:27:02  [facility] [id number]  [facility]/

## 2022-12-18 ENCOUNTER — HOSPITAL ENCOUNTER (INPATIENT)
Facility: HOSPITAL | Age: 82
LOS: 4 days | Discharge: HOME HEALTH | End: 2022-12-22
Attending: EMERGENCY MEDICINE | Admitting: INTERNAL MEDICINE
Payer: MEDICAID

## 2022-12-18 ENCOUNTER — HOSPITAL ENCOUNTER (INPATIENT)
Facility: HOSPITAL | Age: 82
LOS: 4 days | Discharge: HOME | End: 2022-12-22
Attending: EMERGENCY MEDICINE | Admitting: INTERNAL MEDICINE
Payer: MEDICAID

## 2022-12-18 DIAGNOSIS — N18.6 ESRD (END STAGE RENAL DISEASE) (HCC): ICD-10-CM

## 2022-12-18 DIAGNOSIS — R73.9 HYPERGLYCEMIA: ICD-10-CM

## 2022-12-18 DIAGNOSIS — R23.8 VESICULAR SKIN LESIONS: ICD-10-CM

## 2022-12-18 DIAGNOSIS — L03.113 CELLULITIS OF RIGHT UPPER EXTREMITY: Primary | ICD-10-CM

## 2022-12-18 DIAGNOSIS — D64.9 ANEMIA, UNSPECIFIED TYPE: ICD-10-CM

## 2022-12-18 LAB
ALBUMIN SERPL-MCNC: 3.6 G/DL (ref 3.4–5)
ALBUMIN/GLOB SERPL: 0.9 {RATIO} (ref 1–2)
ALP LIVER SERPL-CCNC: 84 U/L
ALT SERPL-CCNC: <6 U/L
ANION GAP SERPL CALC-SCNC: 8 MMOL/L (ref 0–18)
AST SERPL-CCNC: 19 U/L (ref 15–37)
BASOPHILS # BLD AUTO: 0.02 X10(3) UL (ref 0–0.2)
BASOPHILS NFR BLD AUTO: 0.3 %
BILIRUB SERPL-MCNC: 0.3 MG/DL (ref 0.1–2)
BUN BLD-MCNC: 57 MG/DL (ref 7–18)
CALCIUM BLD-MCNC: 9.5 MG/DL (ref 8.5–10.1)
CHLORIDE SERPL-SCNC: 100 MMOL/L (ref 98–112)
CO2 SERPL-SCNC: 30 MMOL/L (ref 21–32)
CREAT BLD-MCNC: 7.16 MG/DL
EOSINOPHIL # BLD AUTO: 0.63 X10(3) UL (ref 0–0.7)
EOSINOPHIL NFR BLD AUTO: 10.1 %
ERYTHROCYTE [DISTWIDTH] IN BLOOD BY AUTOMATED COUNT: 15.4 %
GFR SERPLBLD BASED ON 1.73 SQ M-ARVRAT: 7 ML/MIN/1.73M2 (ref 60–?)
GLOBULIN PLAS-MCNC: 4.1 G/DL (ref 2.8–4.4)
GLUCOSE BLD-MCNC: 142 MG/DL (ref 70–99)
GLUCOSE BLD-MCNC: 189 MG/DL (ref 70–99)
HCT VFR BLD AUTO: 30.2 %
HGB BLD-MCNC: 10.3 G/DL
IMM GRANULOCYTES # BLD AUTO: 0.01 X10(3) UL (ref 0–1)
IMM GRANULOCYTES NFR BLD: 0.2 %
LACTATE SERPL-SCNC: 0.8 MMOL/L (ref 0.4–2)
LYMPHOCYTES # BLD AUTO: 1.43 X10(3) UL (ref 1–4)
LYMPHOCYTES NFR BLD AUTO: 22.9 %
MCH RBC QN AUTO: 32.7 PG (ref 26–34)
MCHC RBC AUTO-ENTMCNC: 34.1 G/DL (ref 31–37)
MCV RBC AUTO: 95.9 FL
MONOCYTES # BLD AUTO: 0.84 X10(3) UL (ref 0.1–1)
MONOCYTES NFR BLD AUTO: 13.4 %
NEUTROPHILS # BLD AUTO: 3.32 X10 (3) UL (ref 1.5–7.7)
NEUTROPHILS # BLD AUTO: 3.32 X10(3) UL (ref 1.5–7.7)
NEUTROPHILS NFR BLD AUTO: 53.1 %
OSMOLALITY SERPL CALC.SUM OF ELEC: 304 MOSM/KG (ref 275–295)
PLATELET # BLD AUTO: 119 10(3)UL (ref 150–450)
POTASSIUM SERPL-SCNC: 4.1 MMOL/L (ref 3.5–5.1)
PROT SERPL-MCNC: 7.7 G/DL (ref 6.4–8.2)
RBC # BLD AUTO: 3.15 X10(6)UL
SARS-COV-2 RNA RESP QL NAA+PROBE: NOT DETECTED
SODIUM SERPL-SCNC: 138 MMOL/L (ref 136–145)
WBC # BLD AUTO: 6.3 X10(3) UL (ref 4–11)

## 2022-12-18 PROCEDURE — 99223 1ST HOSP IP/OBS HIGH 75: CPT | Performed by: INTERNAL MEDICINE

## 2022-12-18 RX ORDER — SENNOSIDES 8.6 MG
17.2 TABLET ORAL NIGHTLY PRN
Status: DISCONTINUED | OUTPATIENT
Start: 2022-12-18 | End: 2022-12-22

## 2022-12-18 RX ORDER — METOCLOPRAMIDE HYDROCHLORIDE 5 MG/ML
5 INJECTION INTRAMUSCULAR; INTRAVENOUS EVERY 8 HOURS PRN
Status: DISCONTINUED | OUTPATIENT
Start: 2022-12-18 | End: 2022-12-22

## 2022-12-18 RX ORDER — BISACODYL 10 MG
10 SUPPOSITORY, RECTAL RECTAL
Status: DISCONTINUED | OUTPATIENT
Start: 2022-12-18 | End: 2022-12-22

## 2022-12-18 RX ORDER — POLYETHYLENE GLYCOL 3350 17 G/17G
17 POWDER, FOR SOLUTION ORAL DAILY PRN
Status: DISCONTINUED | OUTPATIENT
Start: 2022-12-18 | End: 2022-12-22

## 2022-12-18 RX ORDER — HEPARIN SODIUM 5000 [USP'U]/ML
5000 INJECTION, SOLUTION INTRAVENOUS; SUBCUTANEOUS EVERY 8 HOURS SCHEDULED
Status: DISCONTINUED | OUTPATIENT
Start: 2022-12-18 | End: 2022-12-22

## 2022-12-18 RX ORDER — MELATONIN
3 NIGHTLY PRN
Status: DISCONTINUED | OUTPATIENT
Start: 2022-12-18 | End: 2022-12-22

## 2022-12-18 RX ORDER — ACETAMINOPHEN 500 MG
500 TABLET ORAL EVERY 4 HOURS PRN
Status: DISCONTINUED | OUTPATIENT
Start: 2022-12-18 | End: 2022-12-22

## 2022-12-18 RX ORDER — ONDANSETRON 2 MG/ML
4 INJECTION INTRAMUSCULAR; INTRAVENOUS EVERY 6 HOURS PRN
Status: DISCONTINUED | OUTPATIENT
Start: 2022-12-18 | End: 2022-12-22

## 2022-12-18 RX ORDER — VANCOMYCIN 2 GRAM/500 ML IN 0.9 % SODIUM CHLORIDE INTRAVENOUS
25 ONCE
Status: COMPLETED | OUTPATIENT
Start: 2022-12-18 | End: 2022-12-19

## 2022-12-19 PROBLEM — N18.6 ANEMIA IN ESRD (END-STAGE RENAL DISEASE) (HCC): Status: ACTIVE | Noted: 2022-06-03

## 2022-12-19 PROBLEM — D63.1 ANEMIA IN ESRD (END-STAGE RENAL DISEASE) (HCC): Status: ACTIVE | Noted: 2022-06-03

## 2022-12-19 PROBLEM — N18.6 ANEMIA IN ESRD (END-STAGE RENAL DISEASE)  (HCC): Status: ACTIVE | Noted: 2022-06-03

## 2022-12-19 PROBLEM — N18.6 ANEMIA IN ESRD (END-STAGE RENAL DISEASE): Status: ACTIVE | Noted: 2022-06-03

## 2022-12-19 PROBLEM — D63.1 ANEMIA IN ESRD (END-STAGE RENAL DISEASE)  (HCC): Status: ACTIVE | Noted: 2022-06-03

## 2022-12-19 PROBLEM — D63.1 ANEMIA IN ESRD (END-STAGE RENAL DISEASE): Status: ACTIVE | Noted: 2022-06-03

## 2022-12-19 LAB
ALBUMIN SERPL-MCNC: 2.9 G/DL (ref 3.4–5)
ALBUMIN/GLOB SERPL: 0.9 {RATIO} (ref 1–2)
ALP LIVER SERPL-CCNC: 69 U/L
ALT SERPL-CCNC: <6 U/L
ANION GAP SERPL CALC-SCNC: 7 MMOL/L (ref 0–18)
AST SERPL-CCNC: 12 U/L (ref 15–37)
BILIRUB SERPL-MCNC: 0.3 MG/DL (ref 0.1–2)
BUN BLD-MCNC: 67 MG/DL (ref 7–18)
CALCIUM BLD-MCNC: 8.9 MG/DL (ref 8.5–10.1)
CHLORIDE SERPL-SCNC: 104 MMOL/L (ref 98–112)
CO2 SERPL-SCNC: 27 MMOL/L (ref 21–32)
CREAT BLD-MCNC: 7.8 MG/DL
ERYTHROCYTE [DISTWIDTH] IN BLOOD BY AUTOMATED COUNT: 15.6 %
GFR SERPLBLD BASED ON 1.73 SQ M-ARVRAT: 6 ML/MIN/1.73M2 (ref 60–?)
GLOBULIN PLAS-MCNC: 3.3 G/DL (ref 2.8–4.4)
GLUCOSE BLD-MCNC: 115 MG/DL (ref 70–99)
GLUCOSE BLD-MCNC: 142 MG/DL (ref 70–99)
GLUCOSE BLD-MCNC: 161 MG/DL (ref 70–99)
GLUCOSE BLD-MCNC: 164 MG/DL (ref 70–99)
GLUCOSE BLD-MCNC: 267 MG/DL (ref 70–99)
HCT VFR BLD AUTO: 27.7 %
HGB BLD-MCNC: 8.7 G/DL
MAGNESIUM SERPL-MCNC: 2.4 MG/DL (ref 1.6–2.6)
MCH RBC QN AUTO: 33 PG (ref 26–34)
MCHC RBC AUTO-ENTMCNC: 31.4 G/DL (ref 31–37)
MCV RBC AUTO: 104.9 FL
OSMOLALITY SERPL CALC.SUM OF ELEC: 309 MOSM/KG (ref 275–295)
PHOSPHATE SERPL-MCNC: 3.8 MG/DL (ref 2.5–4.9)
PLATELET # BLD AUTO: 100 10(3)UL (ref 150–450)
POTASSIUM SERPL-SCNC: 4.2 MMOL/L (ref 3.5–5.1)
PROT SERPL-MCNC: 6.2 G/DL (ref 6.4–8.2)
RBC # BLD AUTO: 2.64 X10(6)UL
SODIUM SERPL-SCNC: 138 MMOL/L (ref 136–145)
WBC # BLD AUTO: 4.3 X10(3) UL (ref 4–11)

## 2022-12-19 PROCEDURE — 99222 1ST HOSP IP/OBS MODERATE 55: CPT | Performed by: INTERNAL MEDICINE

## 2022-12-19 PROCEDURE — 99232 SBSQ HOSP IP/OBS MODERATE 35: CPT | Performed by: INTERNAL MEDICINE

## 2022-12-19 RX ORDER — LOSARTAN POTASSIUM 100 MG/1
100 TABLET ORAL DAILY
Status: DISCONTINUED | OUTPATIENT
Start: 2022-12-19 | End: 2022-12-22

## 2022-12-19 RX ORDER — CALCIUM ACETATE 667 MG/1
2 CAPSULE ORAL
Status: DISCONTINUED | OUTPATIENT
Start: 2022-12-19 | End: 2022-12-22

## 2022-12-19 RX ORDER — ESCITALOPRAM OXALATE 20 MG/1
20 TABLET ORAL EVERY MORNING
Status: DISCONTINUED | OUTPATIENT
Start: 2022-12-19 | End: 2022-12-22

## 2022-12-19 RX ORDER — AMLODIPINE BESYLATE 10 MG/1
10 TABLET ORAL DAILY
Status: DISCONTINUED | OUTPATIENT
Start: 2022-12-19 | End: 2022-12-22

## 2022-12-19 RX ORDER — ROSUVASTATIN CALCIUM 10 MG/1
10 TABLET, COATED ORAL NIGHTLY
Status: DISCONTINUED | OUTPATIENT
Start: 2022-12-19 | End: 2022-12-22

## 2022-12-19 RX ORDER — LORAZEPAM 0.5 MG/1
0.5 TABLET ORAL NIGHTLY PRN
Status: DISCONTINUED | OUTPATIENT
Start: 2022-12-19 | End: 2022-12-22

## 2022-12-19 RX ORDER — DOCUSATE SODIUM 100 MG/1
100 CAPSULE, LIQUID FILLED ORAL 2 TIMES DAILY
Status: DISCONTINUED | OUTPATIENT
Start: 2022-12-19 | End: 2022-12-22

## 2022-12-19 RX ORDER — TRAZODONE HYDROCHLORIDE 50 MG/1
50 TABLET ORAL NIGHTLY
Status: DISCONTINUED | OUTPATIENT
Start: 2022-12-19 | End: 2022-12-22

## 2022-12-19 RX ORDER — MORPHINE SULFATE 2 MG/ML
2 INJECTION, SOLUTION INTRAMUSCULAR; INTRAVENOUS EVERY 2 HOUR PRN
Status: DISCONTINUED | OUTPATIENT
Start: 2022-12-19 | End: 2022-12-22

## 2022-12-19 RX ORDER — ALBUTEROL SULFATE 90 UG/1
2 AEROSOL, METERED RESPIRATORY (INHALATION) EVERY 6 HOURS PRN
Status: DISCONTINUED | OUTPATIENT
Start: 2022-12-19 | End: 2022-12-22

## 2022-12-19 RX ORDER — ROPINIROLE 0.5 MG/1
1 TABLET, FILM COATED ORAL NIGHTLY
Status: DISCONTINUED | OUTPATIENT
Start: 2022-12-19 | End: 2022-12-22

## 2022-12-19 RX ORDER — MORPHINE SULFATE 2 MG/ML
1 INJECTION, SOLUTION INTRAMUSCULAR; INTRAVENOUS EVERY 2 HOUR PRN
Status: DISCONTINUED | OUTPATIENT
Start: 2022-12-19 | End: 2022-12-22

## 2022-12-19 RX ORDER — HEPARIN SODIUM 1000 [USP'U]/ML
1.5 INJECTION, SOLUTION INTRAVENOUS; SUBCUTANEOUS AS NEEDED
Status: DISCONTINUED | OUTPATIENT
Start: 2022-12-19 | End: 2022-12-22

## 2022-12-19 RX ORDER — GABAPENTIN 300 MG/1
300 CAPSULE ORAL 3 TIMES DAILY
Status: DISCONTINUED | OUTPATIENT
Start: 2022-12-19 | End: 2022-12-22

## 2022-12-19 RX ORDER — MORPHINE SULFATE 2 MG/ML
0.5 INJECTION, SOLUTION INTRAMUSCULAR; INTRAVENOUS EVERY 2 HOUR PRN
Status: DISCONTINUED | OUTPATIENT
Start: 2022-12-19 | End: 2022-12-22

## 2022-12-19 RX ORDER — ASPIRIN 81 MG/1
81 TABLET ORAL DAILY
Status: DISCONTINUED | OUTPATIENT
Start: 2022-12-20 | End: 2022-12-22

## 2022-12-19 RX ORDER — CINACALCET 30 MG/1
30 TABLET, FILM COATED ORAL
Status: DISCONTINUED | OUTPATIENT
Start: 2022-12-19 | End: 2022-12-19

## 2022-12-19 RX ORDER — VALACYCLOVIR HYDROCHLORIDE 500 MG/1
500 TABLET, FILM COATED ORAL
Status: DISCONTINUED | OUTPATIENT
Start: 2022-12-19 | End: 2022-12-22

## 2022-12-19 RX ORDER — HYDRALAZINE HYDROCHLORIDE 25 MG/1
25 TABLET, FILM COATED ORAL 3 TIMES DAILY
Status: DISCONTINUED | OUTPATIENT
Start: 2022-12-19 | End: 2022-12-22

## 2022-12-19 RX ORDER — FOLIC ACID 1 MG/1
1 TABLET ORAL DAILY
Status: DISCONTINUED | OUTPATIENT
Start: 2022-12-19 | End: 2022-12-22

## 2022-12-19 RX ORDER — CINACALCET 30 MG/1
30 TABLET, FILM COATED ORAL
Status: DISCONTINUED | OUTPATIENT
Start: 2022-12-22 | End: 2022-12-22

## 2022-12-19 NOTE — PROGRESS NOTES
[facility] Pharmacy Note:  Renal Adjustment for ampicillin/sulbactam (Nolvia Truong)    Sanchez Cortez is a 81year old patient who has been prescribed ampicillin/sulbactam (UNASYN) 1.5 gm every 6 hrs. The estimated creatinine clearance is 8.1 mL/min (A) (based on SCr of 7.16 mg/dL (H)). Pt is on hemodialysis and the dose has been adjusted to ampicillin/sulbactam (UNASYN) 3 gm every 24 hrs per hospital renal dose adjustment protocol for treatment of cellulitis. Pharmacy will follow and adjust dose as warranted for additional renal function changes.     Thank you,    Linda Edwards, PharmD  12/18/2022  11:42 PM

## 2022-12-19 NOTE — ED INITIAL ASSESSMENT (HPI)
Family states he had a new RUE fistula placed on 12/15 by Dr. Keyshawn Lagunas. C/o redness and swelling, decreased ROM. Denies fever. Has been taking norco for pain, states it not helping.  Last dose 5pm.

## 2022-12-19 NOTE — ED QUICK NOTES
Spoke with MD regarding pt being a difficult IV stick. Discussed deferring 2nd set of blood cultures at this time.  Cleared with MD.

## 2022-12-19 NOTE — PLAN OF CARE
Assumed care around 0730. AxO x4. NSR on tele, RA, VSS. Pt c/o pain 5-6/10. PRN Tylenol given with minimal relief. New orders for PRN Morphine, given with relief. RUE dressing removed by Dr. Clarke Espana. Orders to cleanse with Betadine and wrap with Kerlix. Dressing applied. HD scheduled for tomorrow. Daughter at bedside this am.  Pt and daughter updated on [de-identified].

## 2022-12-19 NOTE — PLAN OF CARE
NURSING ADMISSION NOTE    Patient admitted via Cart  Oriented to room. Safety precautions initiated. Bed in low position. Call light in reach. Assumed care around 0000. Pt A & O x 4.   RA.   NSR on tele. RUE AV fistula site swollen with decreased range of motion. Primarily Sinhala speaking, but understands and speaks some [place]. Plan for ultrasound of fistula site. Admission completed with pt daughter and patient. Patient and family updated on plan of care.

## 2022-12-19 NOTE — ED QUICK NOTES
Orders for admission, patient is aware of plan and ready to go upstairs. Any questions, please call ED RN philip at extension [facility] nurses station [phone].      Patient Covid vaccination status: Fully vaccinated     COVID Test Ordered in ED: Rapid SARS-CoV-2 by PCR    COVID Suspicion at Admission: Low clinical suspicion for COVID    Running Infusions:  antibiotic infusing    Mental Status/LOC at time of transport: a&ox3    Other pertinent information:   CIWA score: N/A   NIH score:  N/A

## 2022-12-19 NOTE — CONSULTS
[facility] Dosing Service    Initial Pharmacokinetic Consult for Vancomycin Dosing     Ana Garcia is a 81year old patient who is being treated for cellulitis. Pharmacy has been asked to dose Vancomycin by Dr. Dorie Kelly     Weights:  Ideal body weight: 70.7 kg (155 lb 13.8 oz)  Adjusted ideal body weight: 74 kg (163 lb 1.9 oz)  Actual weight:  78.9 kg (174 lb)    Dialysis Details:  [facility]  Last dialysis was pta    Based on the above:    1. This patient has received a loading dose of Vancomycin  2000 mg IVPB (25mg/kg, capped at 2000 mg) x 1 dose. This will be followed by 750 mg given after each dialysis session. 2.  Pharmacy will order a vancomycin random level prior to the 3rd dialysis session. Goal pre-dialysis level is 15-20 mcg/mL which is likely to achieve the goal AUC24 of 400 to 600 mg-h/L.    3. Pharmacy will follow and monitor renal function, toxicity and efficacy. We appreciate the opportunity to assist in the care of this patient.     Hayley Blue, PharmD  12/19/2022  12:26 PM  [address] [place] [address] Extension: [phone]

## 2022-12-19 NOTE — PROGRESS NOTES
[facility] Pharmacy Note:  Renal Adjustment for valacyclovir (Kelly Luu)    Leida De Leon is a 81year old patient who has been prescribed valacyclovir (VALTREX) 1000 mg every 24 hrs. The estimated creatinine clearance is 8.1 mL/min (A) (based on SCr of 7.16 mg/dL (H)). The dose has been adjusted to valacyclovir (VALTREX) 500 mg every 24 hrs per hospital renal dose adjustment protocol for treatment of  severe varicella infection . Pharmacy will follow and adjust dose as warranted for additional renal function changes.     Thank you,    Rosi Chahal, PharmD  12/19/2022  3:50 AM

## 2022-12-20 LAB
ANION GAP SERPL CALC-SCNC: 6 MMOL/L (ref 0–18)
BASOPHILS # BLD AUTO: 0.02 X10(3) UL (ref 0–0.2)
BASOPHILS NFR BLD AUTO: 0.5 %
BUN BLD-MCNC: 86 MG/DL (ref 7–18)
CALCIUM BLD-MCNC: 8.6 MG/DL (ref 8.5–10.1)
CHLORIDE SERPL-SCNC: 103 MMOL/L (ref 98–112)
CO2 SERPL-SCNC: 28 MMOL/L (ref 21–32)
CREAT BLD-MCNC: 9.38 MG/DL
EOSINOPHIL # BLD AUTO: 0.65 X10(3) UL (ref 0–0.7)
EOSINOPHIL NFR BLD AUTO: 15.5 %
ERYTHROCYTE [DISTWIDTH] IN BLOOD BY AUTOMATED COUNT: 15.1 %
GFR SERPLBLD BASED ON 1.73 SQ M-ARVRAT: 5 ML/MIN/1.73M2 (ref 60–?)
GLUCOSE BLD-MCNC: 101 MG/DL (ref 70–99)
GLUCOSE BLD-MCNC: 103 MG/DL (ref 70–99)
GLUCOSE BLD-MCNC: 114 MG/DL (ref 70–99)
GLUCOSE BLD-MCNC: 127 MG/DL (ref 70–99)
GLUCOSE BLD-MCNC: 154 MG/DL (ref 70–99)
HCT VFR BLD AUTO: 26.8 %
HGB BLD-MCNC: 9.2 G/DL
IMM GRANULOCYTES # BLD AUTO: 0.01 X10(3) UL (ref 0–1)
IMM GRANULOCYTES NFR BLD: 0.2 %
LYMPHOCYTES # BLD AUTO: 1.13 X10(3) UL (ref 1–4)
LYMPHOCYTES NFR BLD AUTO: 27 %
MCH RBC QN AUTO: 33.5 PG (ref 26–34)
MCHC RBC AUTO-ENTMCNC: 34.3 G/DL (ref 31–37)
MCV RBC AUTO: 97.5 FL
MONOCYTES # BLD AUTO: 0.52 X10(3) UL (ref 0.1–1)
MONOCYTES NFR BLD AUTO: 12.4 %
NEUTROPHILS # BLD AUTO: 1.86 X10 (3) UL (ref 1.5–7.7)
NEUTROPHILS # BLD AUTO: 1.86 X10(3) UL (ref 1.5–7.7)
NEUTROPHILS NFR BLD AUTO: 44.4 %
OSMOLALITY SERPL CALC.SUM OF ELEC: 310 MOSM/KG (ref 275–295)
PLATELET # BLD AUTO: 112 10(3)UL (ref 150–450)
POTASSIUM SERPL-SCNC: 4.8 MMOL/L (ref 3.5–5.1)
RBC # BLD AUTO: 2.75 X10(6)UL
SODIUM SERPL-SCNC: 137 MMOL/L (ref 136–145)
WBC # BLD AUTO: 4.2 X10(3) UL (ref 4–11)

## 2022-12-20 PROCEDURE — 99232 SBSQ HOSP IP/OBS MODERATE 35: CPT | Performed by: INTERNAL MEDICINE

## 2022-12-20 PROCEDURE — 90935 HEMODIALYSIS ONE EVALUATION: CPT | Performed by: INTERNAL MEDICINE

## 2022-12-20 PROCEDURE — 5A1D70Z PERFORMANCE OF URINARY FILTRATION, INTERMITTENT, LESS THAN 6 HOURS PER DAY: ICD-10-PCS | Performed by: INTERNAL MEDICINE

## 2022-12-20 RX ORDER — HYDROCODONE BITARTRATE AND ACETAMINOPHEN 5; 325 MG/1; MG/1
1 TABLET ORAL EVERY 6 HOURS PRN
Status: DISCONTINUED | OUTPATIENT
Start: 2022-12-20 | End: 2022-12-22

## 2022-12-20 NOTE — CONSULTS
[facility]    PATIENT'S NAME: Moriah Sanchez   ATTENDING PHYSICIAN: Rene Tate MD   CONSULTING PHYSICIAN: Zoey Lewis M.D. PATIENT ACCOUNT#:   [de-identified]    LOCATION:  [address] [place] [address]  MEDICAL RECORD #:   [id number]       YOB: 1940  ADMISSION DATE:       12/18/2022      CONSULT DATE:  12/19/2022    REPORT OF CONSULTATION    HISTORY OF PRESENT ILLNESS:  This is an 81-year-old male who received a call last night from his daughter that he had an extensive amount of ecchymosis on his right arm. He has a previous right brachial artery to axillary vein prosthetic graft fistula done 3 days ago. Patient was sent home. He has had some swelling in the right arm. He has a right jugular permacath that has been there for years. He has a little bit of blistering from the Tegaderm that was on his arm. He denies any ischemic pain in the hand. The fistula was still functioning. Question if there is mild redness in the area of the arm. The patient currently has no chest pain or shortness of breath. He has no abdominal pain, no back pain. He denies any nausea or vomiting. The patient is a [place] male who had the procedure performed. He has a known left brachial artery axillary vein prosthetic graft fistula placed years ago in [place]. He has had multiple revascularizations. Supposedly it was infected, and it was decided not to use the left arm at all, but just focus on the right arm. He has had this right jugular permacath for a period of time. He is on dialysis Tuesday, Thursday, and Saturday. PAST MEDICAL HISTORY:  He has a past history of a CVA probably related to hypertension, affecting the right side of his body. MEDICATIONS:  He is on aspirin, midodrine, insulin, hydralazine, Sensipar, Crestor, trazodone, amlodipine, gabapentin. He also received Unasyn as well as vancomycin. ALLERGIES:  No known allergies.       SOCIAL HISTORY:  His wife passed away 2 years ago from [facility]. He has 5 kids. He denies EtOH abuse, drug abuse, tobacco abuse. He is a former . PHYSICAL EXAMINATION:    GENERAL:  He is awake, alert. He is appropriate. No acute distress. VITAL SIGNS:  He is afebrile. Blood pressure 146/70, heart rate 72, respirations are 20. HEENT:  Pupils round. Sclerae clear. Mouth without lesions. LUNGS:  Clear to auscultation. HEART:  Normal without murmur. ABDOMEN:  Soft. No tenderness or masses. EXTREMITIES:  Some swelling in the right arm compared to the left arm. There is some amount ecchymosis in the right arm, maybe some questionable redness near the area of the graft. The graft is functioning. Hand is viable, and there is some mild swelling. There is some blistering of the skin where the Tegaderm was. Moving all 4 extremities. IMPRESSION:  A patent right brachial artery to axillary vein prosthetic graft fistula with surgery done on 12/14/2022. He was placed on IV antibiotics just to prevent it and make sure there is no infection or cellulitis. I consulted Dr. Emigdio Eason. We will get an ultrasound of the fistula probably by tomorrow, but at this time today we will just treat this with local wound care. He has blistering along the back on the other side, which may be compatible with herpes zoster/shingles, and will defer this to Infectious Disease. Discussed with the patient and daughter. All questions were answered. Dictated By Ulysses Aran, M.D.  d: 12/19/2022 15:04:55  t: 12/19/2022 16:31:48  Job [id number]  J/    cc: Roverto Gutierres M.D.    Ulysses Aran, M.D.

## 2022-12-20 NOTE — PLAN OF CARE
Pt alert and oriented x4 Complaints of R arm pain. Dilaudid given. Arm elevated and dressing cdi. Up x1 w/w. NSR on tele. Plan for dialysis today. Slept well through then night. Saline locked. Dr Roxann Echevarria to evaluate plan of care. Will continue to monitor. Problem: Diabetes/Glucose Control  Goal: Glucose maintained within prescribed range  Description: INTERVENTIONS:  - Monitor Blood Glucose as ordered  - Assess for signs and symptoms of hyperglycemia and hypoglycemia  - Administer ordered medications to maintain glucose within target range  - Assess barriers to adequate nutritional intake and initiate nutrition consult as needed  - Instruct patient on self management of diabetes  Outcome: Progressing     Problem: CARDIOVASCULAR - ADULT  Goal: Maintains optimal cardiac output and hemodynamic stability  Description: INTERVENTIONS:  - Monitor vital signs, rhythm, and trends  - Monitor for bleeding, hypotension and signs of decreased cardiac output  - Evaluate effectiveness of vasoactive medications to optimize hemodynamic stability  - Monitor arterial and/or venous puncture sites for bleeding and/or hematoma  - Assess quality of pulses, skin color and temperature  - Assess for signs of decreased coronary artery perfusion - ex.  Angina  - Evaluate fluid balance, assess for edema, trend weights  Outcome: Progressing  Goal: Absence of cardiac arrhythmias or at baseline  Description: INTERVENTIONS:  - Continuous cardiac monitoring, monitor vital signs, obtain 12 lead EKG if indicated  - Evaluate effectiveness of antiarrhythmic and heart rate control medications as ordered  - Initiate emergency measures for life threatening arrhythmias  - Monitor electrolytes and administer replacement therapy as ordered  Outcome: Progressing     Problem: GASTROINTESTINAL - ADULT  Goal: Minimal or absence of nausea and vomiting  Description: INTERVENTIONS:  - Maintain adequate hydration with IV or PO as ordered and tolerated  - Nasogastric tube to low intermittent suction as ordered  - Evaluate effectiveness of ordered antiemetic medications  - Provide nonpharmacologic comfort measures as appropriate  - Advance diet as tolerated, if ordered  - Obtain nutritional consult as needed  - Evaluate fluid balance  Outcome: Progressing  Goal: Maintains or returns to baseline bowel function  Description: INTERVENTIONS:  - Assess bowel function  - Maintain adequate hydration with IV or PO as ordered and tolerated  - Evaluate effectiveness of GI medications  - Encourage mobilization and activity  - Obtain nutritional consult as needed  - Establish a toileting routine/schedule  - Consider collaborating with pharmacy to review patient's medication profile  Outcome: Progressing  Goal: Maintains adequate nutritional intake (undernourished)  Description: INTERVENTIONS:  - Monitor percentage of each meal consumed  - Identify factors contributing to decreased intake, treat as appropriate  - Assist with meals as needed  - Monitor I&O, WT and lab values  - Obtain nutritional consult as needed  - Optimize oral hygiene and moisture  - Encourage food from home; allow for food preferences  - Enhance eating environment  Outcome: Progressing  Goal: Achieves appropriate nutritional intake (bariatric)  Description: INTERVENTIONS:  - Monitor for over-consumption  - Identify factors contributing to increased intake, treat as appropriate  - Monitor I&O, WT and lab values  - Obtain nutritional consult as needed  - Evaluate psychosocial factors contributing to over-consumption  Outcome: Progressing     Problem: MUSCULOSKELETAL - ADULT  Goal: Return mobility to safest level of function  Description: INTERVENTIONS:  - Assess patient stability and activity tolerance for standing, transferring and ambulating w/ or w/o assistive devices  - Assist with transfers and ambulation using safe patient handling equipment as needed  - Ensure adequate protection for wounds/incisions during mobilization  - Obtain PT/OT consults as needed  - Advance activity as appropriate  - Communicate ordered activity level and limitations with patient/family  Outcome: Progressing  Goal: Maintain proper alignment of affected body part  Description: INTERVENTIONS:  - Support and protect limb and body alignment per provider's orders  - Instruct and reinforce with patient and family use of appropriate assistive device and precautions (e.g. spinal or hip dislocation precautions)  Outcome: Progressing     Problem: NEUROLOGICAL - ADULT  Goal: Achieves stable or improved neurological status  Description: INTERVENTIONS  - Assess for and report changes in neurological status  - Initiate measures to prevent increased intracranial pressure  - Maintain blood pressure and fluid volume within ordered parameters to optimize cerebral perfusion and minimize risk of hemorrhage  - Monitor temperature, glucose, and sodium.  Initiate appropriate interventions as ordered  Outcome: Progressing  Goal: Absence of seizures  Description: INTERVENTIONS  - Monitor for seizure activity  - Administer anti-seizure medications as ordered  - Monitor neurological status  Outcome: Progressing  Goal: Remains free of injury related to seizure activity  Description: INTERVENTIONS:  - Maintain airway, patient safety  and administer oxygen as ordered  - Monitor patient for seizure activity, document and report duration and description of seizure to MD/LIP  - If seizure occurs, turn patient to side and suction secretions as needed  - Reorient patient post seizure  - Seizure pads on all 4 side rails  - Instruct patient/family to notify RN of any seizure activity  - Instruct patient/family to call for assistance with activity based on assessment  Outcome: Progressing  Goal: Achieves maximal functionality and self care  Description: INTERVENTIONS  - Monitor swallowing and airway patency with patient fatigue and changes in neurological status  - Encourage and assist patient to increase activity and self care with guidance from PT/OT  - Encourage visually impaired, hearing impaired and aphasic patients to use assistive/communication devices  Outcome: Progressing

## 2022-12-20 NOTE — PLAN OF CARE
Assumed care at 0730. A&Ox4, RA, VSS.  2.5L off during dialysis. C/o R arm pain, PRN's given. Afebrile. Abx infusing per MAR. Contact iso for possible herpes zoster. US of RUE ordered. Daughter at bedside.

## 2022-12-21 LAB
GLUCOSE BLD-MCNC: 144 MG/DL (ref 70–99)
GLUCOSE BLD-MCNC: 148 MG/DL (ref 70–99)
GLUCOSE BLD-MCNC: 151 MG/DL (ref 70–99)
GLUCOSE BLD-MCNC: 86 MG/DL (ref 70–99)

## 2022-12-21 PROCEDURE — 99232 SBSQ HOSP IP/OBS MODERATE 35: CPT | Performed by: HOSPITALIST

## 2022-12-21 PROCEDURE — 99232 SBSQ HOSP IP/OBS MODERATE 35: CPT | Performed by: INTERNAL MEDICINE

## 2022-12-21 RX ORDER — VANCOMYCIN/0.9 % SOD CHLORIDE 750 MG/250
750 PLASTIC BAG, INJECTION (ML) INTRAVENOUS
Qty: 7500 ML | Refills: 0 | Status: SHIPPED | OUTPATIENT
Start: 2022-12-21 | End: 2023-01-04

## 2022-12-21 RX ORDER — VALACYCLOVIR HYDROCHLORIDE 500 MG/1
500 TABLET, FILM COATED ORAL
Qty: 7 TABLET | Refills: 0 | Status: SHIPPED | OUTPATIENT
Start: 2022-12-22 | End: 2022-12-22

## 2022-12-21 NOTE — PROGRESS NOTES
No complaints- seen with family present.  Wounds intact- still some erythema on skin- agree with antibiotics- can go home tomorrow on outpatient iv antibiotics- told patient to follow up

## 2022-12-21 NOTE — CM/SW NOTE
Pt discussed with ID PA, plans for iv abx with dialysis. SW called pt dtr, confirmed pt receives dialysis T,Th,SA at [facility]. Will need to fax clinical as well as Final orders and latest progress notes to [facility] location at [phone]. Dtr inquiring if [facility] RN can be set up for continued oversight. SW sent referral in [facility], awaiting response. Message sent to RN, PA on plans. Referral packet left at RN station in the case of pt dc and orders are entered when SW not available.      ALEXA Shepard  Discharge 2011 [facility]

## 2022-12-22 ENCOUNTER — TELEPHONE (OUTPATIENT)
Dept: FAMILY MEDICINE CLINIC | Facility: CLINIC | Age: 82
End: 2022-12-22

## 2022-12-22 VITALS
HEART RATE: 74 BPM | TEMPERATURE: 98 F | DIASTOLIC BLOOD PRESSURE: 61 MMHG | SYSTOLIC BLOOD PRESSURE: 167 MMHG | BODY MASS INDEX: 25 KG/M2 | RESPIRATION RATE: 14 BRPM | OXYGEN SATURATION: 96 % | WEIGHT: 170.88 LBS

## 2022-12-22 LAB
GLUCOSE BLD-MCNC: 105 MG/DL (ref 70–99)
GLUCOSE BLD-MCNC: 174 MG/DL (ref 70–99)

## 2022-12-22 PROCEDURE — 90935 HEMODIALYSIS ONE EVALUATION: CPT | Performed by: INTERNAL MEDICINE

## 2022-12-22 PROCEDURE — 99239 HOSP IP/OBS DSCHRG MGMT >30: CPT | Performed by: HOSPITALIST

## 2022-12-22 RX ORDER — VALACYCLOVIR HYDROCHLORIDE 500 MG/1
500 TABLET, FILM COATED ORAL
Qty: 7 TABLET | Refills: 0 | Status: SHIPPED | OUTPATIENT
Start: 2022-12-22 | End: 2022-12-29

## 2022-12-22 NOTE — CM/SW NOTE
SW faxed complete packet with orders and latest ID note to [address]- [phone]. [facility] referral checked, currently no accepting- SW sent [facility] for next week for pt to be seen as [facility] may be delayed due to weather and Holiday. SW to remain available for dc needs.     ALEXA Hartley  Discharge 2011 [facility]

## 2022-12-22 NOTE — PLAN OF CARE
Assumed care at 0730. A&Ox4, RA, VSS. C/o R arm pain, PRN's given. Afebrile. Contact iso for possible herpes zoster. Possible discharge tomorrow. Dialysis ordered for tomorrow. Daughter at bedside.

## 2022-12-22 NOTE — TELEPHONE ENCOUNTER
Pt being discharged from hospital today, disha calling to see if [address] [place] [address] will follow patient for home health. Advised since pcp he would.  TERRENCE

## 2022-12-22 NOTE — PROGRESS NOTES
No complaints- wounds intact/ Erythema resolving. Afebrile. Neuro intact. Graft patent- No US done - will do as outpatient.  Can discharge if OK with other MDs

## 2022-12-22 NOTE — TELEPHONE ENCOUNTER
Spoke to Pt's daughter. She is not ready to schedule Hospital F/U. She will call or schedule through My Chart once Pt is released.

## 2022-12-22 NOTE — PLAN OF CARE
Alert and oriented x4. On RA. Denies any SOB or chest pain. NSR on tele. Continent of bowel and bladder. Pain to RUE controlled with prn meds. Up SBA + cane. QID accucheck. Call light within reach. Problem: Diabetes/Glucose Control  Goal: Glucose maintained within prescribed range  Description: INTERVENTIONS:  - Monitor Blood Glucose as ordered  - Assess for signs and symptoms of hyperglycemia and hypoglycemia  - Administer ordered medications to maintain glucose within target range  - Assess barriers to adequate nutritional intake and initiate nutrition consult as needed  - Instruct patient on self management of diabetes  Outcome: Progressing     Problem: CARDIOVASCULAR - ADULT  Goal: Maintains optimal cardiac output and hemodynamic stability  Description: INTERVENTIONS:  - Monitor vital signs, rhythm, and trends  - Monitor for bleeding, hypotension and signs of decreased cardiac output  - Evaluate effectiveness of vasoactive medications to optimize hemodynamic stability  - Monitor arterial and/or venous puncture sites for bleeding and/or hematoma  - Assess quality of pulses, skin color and temperature  - Assess for signs of decreased coronary artery perfusion - ex.  Angina  - Evaluate fluid balance, assess for edema, trend weights  Outcome: Progressing  Goal: Absence of cardiac arrhythmias or at baseline  Description: INTERVENTIONS:  - Continuous cardiac monitoring, monitor vital signs, obtain 12 lead EKG if indicated  - Evaluate effectiveness of antiarrhythmic and heart rate control medications as ordered  - Initiate emergency measures for life threatening arrhythmias  - Monitor electrolytes and administer replacement therapy as ordered  Outcome: Progressing     Problem: GASTROINTESTINAL - ADULT  Goal: Minimal or absence of nausea and vomiting  Description: INTERVENTIONS:  - Maintain adequate hydration with IV or PO as ordered and tolerated  - Nasogastric tube to low intermittent suction as ordered  - Evaluate effectiveness of ordered antiemetic medications  - Provide nonpharmacologic comfort measures as appropriate  - Advance diet as tolerated, if ordered  - Obtain nutritional consult as needed  - Evaluate fluid balance  Outcome: Progressing  Goal: Maintains or returns to baseline bowel function  Description: INTERVENTIONS:  - Assess bowel function  - Maintain adequate hydration with IV or PO as ordered and tolerated  - Evaluate effectiveness of GI medications  - Encourage mobilization and activity  - Obtain nutritional consult as needed  - Establish a toileting routine/schedule  - Consider collaborating with pharmacy to review patient's medication profile  Outcome: Progressing  Goal: Maintains adequate nutritional intake (undernourished)  Description: INTERVENTIONS:  - Monitor percentage of each meal consumed  - Identify factors contributing to decreased intake, treat as appropriate  - Assist with meals as needed  - Monitor I&O, WT and lab values  - Obtain nutritional consult as needed  - Optimize oral hygiene and moisture  - Encourage food from home; allow for food preferences  - Enhance eating environment  Outcome: Progressing  Goal: Achieves appropriate nutritional intake (bariatric)  Description: INTERVENTIONS:  - Monitor for over-consumption  - Identify factors contributing to increased intake, treat as appropriate  - Monitor I&O, WT and lab values  - Obtain nutritional consult as needed  - Evaluate psychosocial factors contributing to over-consumption  Outcome: Progressing

## 2022-12-22 NOTE — PLAN OF CARE
Assumed care of pt 0730  HD in AM   -BP meds rescheduled post HD  Glucose controlled per protocol  Tolerating meals  All consults clear for discharge   -Hspx   -Nephro   -ID   -Vascular  2 week IV ABX alongside HD schedule  AVS reviewed, all questions answered  Pt discharged [address]    Discharged Home via Wheelchair. Accompanied by Support staff  Belongings Taken by patient/family.

## 2022-12-22 NOTE — DISCHARGE INSTRUCTIONS
Keep wounds clean/dry for one more week- no shower. Can paint incisions with betadine/ cover dry gauze. Call office for temperature> 100. 5/ wound drainage

## 2022-12-23 ENCOUNTER — TELEPHONE (OUTPATIENT)
Dept: FAMILY MEDICINE CLINIC | Facility: CLINIC | Age: 82
End: 2022-12-23

## 2022-12-23 ENCOUNTER — PATIENT OUTREACH (OUTPATIENT)
Dept: CASE MANAGEMENT | Age: 82
End: 2022-12-23

## 2022-12-23 DIAGNOSIS — Z02.9 ENCOUNTERS FOR ADMINISTRATIVE PURPOSE: ICD-10-CM

## 2022-12-23 PROCEDURE — 1111F DSCHRG MED/CURRENT MED MERGE: CPT

## 2022-12-23 NOTE — PROGRESS NOTES
MAITE spoke with patient's daughter Lonnie Garcia who states pt is sleeping at this time. Daughter will call NC back later when patient is awake. [facility] confirmed daughter has contact information.

## 2022-12-23 NOTE — TELEPHONE ENCOUNTER
Spoke to pt's  Daughter Jf Chavez  for HFU today. Pt is not a TCM. He does not have HFU appt scheduled at this time. No available appts by recommended timeframe  HFU appt recommended by 12/29/22 as pt is a high risk for readmission and no later than 1/5/23. Please follow up with daughter to assist in scheduling appt. Thank you.

## 2022-12-27 NOTE — TELEPHONE ENCOUNTER
Dr. Anamaria Pate, do you want patient to be seen sooner or 01/05/23 is ok? Please advise where to schedule if want to be seen sooner.

## 2022-12-28 NOTE — TELEPHONE ENCOUNTER
Called and spoke to patient's daughter. Patient is unable to come in for HFU appt tomorrow as he has dialysis at that time. Earliest time he is able to come is 1/9/23 due to dialysis schedule, other appts and transportation.   Appt scheduled for HFU    Future Appointments   Date Time Provider James Camila   1/9/2023  7:00 AM [facility] LAB [facility]   1/9/2023  8:00 AM 1404 [facility] RM 2 [facility]   1/9/2023  1:30 PM Flores Elizabeth MD [facility] [address] [facility] [address]     Dr. Judah Lozano, [facility]

## 2022-12-30 ENCOUNTER — TELEPHONE (OUTPATIENT)
Dept: FAMILY MEDICINE CLINIC | Facility: CLINIC | Age: 82
End: 2022-12-30

## 2022-12-30 NOTE — TELEPHONE ENCOUNTER
Dr. Harriett Dandy,   [facility]    Per Home Health, paperwork faxed yesterday without signature. They are requesting it be signed asap and refaxed as patient is supposed to start with [facility] on Monday. Form placed on your desk on keyboard.

## 2022-12-30 NOTE — TELEPHONE ENCOUNTER
[facility] called saying they received paperwork from us yesterday with no signature from Dr. Dona Gonzales. They need this signed asap since patient is suppose to start next week Monday.

## 2023-01-04 ENCOUNTER — MED REC SCAN ONLY (OUTPATIENT)
Dept: FAMILY MEDICINE CLINIC | Facility: CLINIC | Age: 83
End: 2023-01-04

## 2023-01-09 ENCOUNTER — OFFICE VISIT (OUTPATIENT)
Dept: FAMILY MEDICINE CLINIC | Facility: CLINIC | Age: 83
End: 2023-01-09
Payer: MEDICAID

## 2023-01-09 ENCOUNTER — NURSE ONLY (OUTPATIENT)
Dept: LAB | Facility: HOSPITAL | Age: 83
End: 2023-01-09
Attending: SURGERY
Payer: MEDICAID

## 2023-01-09 ENCOUNTER — MED REC SCAN ONLY (OUTPATIENT)
Dept: FAMILY MEDICINE CLINIC | Facility: CLINIC | Age: 83
End: 2023-01-09

## 2023-01-09 ENCOUNTER — HOSPITAL ENCOUNTER (OUTPATIENT)
Dept: ULTRASOUND IMAGING | Facility: HOSPITAL | Age: 83
Discharge: HOME | End: 2023-01-09
Attending: SURGERY
Payer: MEDICAID

## 2023-01-09 VITALS
TEMPERATURE: 97 F | DIASTOLIC BLOOD PRESSURE: 64 MMHG | RESPIRATION RATE: 16 BRPM | OXYGEN SATURATION: 98 % | HEIGHT: 69 IN | BODY MASS INDEX: 24.73 KG/M2 | WEIGHT: 167 LBS | SYSTOLIC BLOOD PRESSURE: 174 MMHG | HEART RATE: 79 BPM

## 2023-01-09 DIAGNOSIS — N18.6 END STAGE RENAL DISEASE (HCC): ICD-10-CM

## 2023-01-09 DIAGNOSIS — M54.41 CHRONIC RIGHT-SIDED LOW BACK PAIN WITH RIGHT-SIDED SCIATICA: Primary | ICD-10-CM

## 2023-01-09 DIAGNOSIS — I15.2 HYPERTENSION ASSOCIATED WITH DIABETES (HCC): ICD-10-CM

## 2023-01-09 DIAGNOSIS — G89.29 CHRONIC RIGHT-SIDED LOW BACK PAIN WITH RIGHT-SIDED SCIATICA: Primary | ICD-10-CM

## 2023-01-09 DIAGNOSIS — B02.8 HERPES ZOSTER WITH COMPLICATION: ICD-10-CM

## 2023-01-09 DIAGNOSIS — N18.6 ESRD ON HEMODIALYSIS (HCC): ICD-10-CM

## 2023-01-09 DIAGNOSIS — E11.59 HYPERTENSION ASSOCIATED WITH DIABETES (HCC): ICD-10-CM

## 2023-01-09 DIAGNOSIS — G47.00 INSOMNIA, UNSPECIFIED TYPE: ICD-10-CM

## 2023-01-09 DIAGNOSIS — Z99.2 ESRD ON HEMODIALYSIS (HCC): ICD-10-CM

## 2023-01-09 PROBLEM — R06.02 SHORTNESS OF BREATH: Status: ACTIVE | Noted: 2022-10-31

## 2023-01-09 PROCEDURE — 3008F BODY MASS INDEX DOCD: CPT | Performed by: FAMILY MEDICINE

## 2023-01-09 PROCEDURE — 99214 OFFICE O/P EST MOD 30 MIN: CPT | Performed by: FAMILY MEDICINE

## 2023-01-09 PROCEDURE — 1111F DSCHRG MED/CURRENT MED MERGE: CPT | Performed by: FAMILY MEDICINE

## 2023-01-09 PROCEDURE — 3078F DIAST BP <80 MM HG: CPT | Performed by: FAMILY MEDICINE

## 2023-01-09 PROCEDURE — 93990 DOPPLER FLOW TESTING: CPT | Performed by: SURGERY

## 2023-01-09 PROCEDURE — 3077F SYST BP >= 140 MM HG: CPT | Performed by: FAMILY MEDICINE

## 2023-01-09 RX ORDER — HYDRALAZINE HYDROCHLORIDE 50 MG/1
TABLET, FILM COATED ORAL 3 TIMES DAILY
COMMUNITY
Start: 2023-01-05

## 2023-01-09 RX ORDER — CYCLOBENZAPRINE HCL 5 MG
5 TABLET ORAL NIGHTLY
Qty: 20 TABLET | Refills: 0 | Status: SHIPPED | OUTPATIENT
Start: 2023-01-09

## 2023-01-11 ENCOUNTER — TELEPHONE (OUTPATIENT)
Dept: FAMILY MEDICINE CLINIC | Facility: CLINIC | Age: 83
End: 2023-01-11

## 2023-01-11 NOTE — TELEPHONE ENCOUNTER
Carmen calling from [address] [place] [address] to request order for Physical Therapy. Will also fax telephone order for provider's signature once nurse confirms.

## 2023-01-11 NOTE — TELEPHONE ENCOUNTER
Spoke to [place], from home health and provided verbal regarding Physical Therapy. Order will be faxed to office for PCP's signature. Understanding was verbalized.

## 2023-01-12 ENCOUNTER — TELEPHONE (OUTPATIENT)
Dept: FAMILY MEDICINE CLINIC | Facility: CLINIC | Age: 83
End: 2023-01-12

## 2023-01-12 NOTE — TELEPHONE ENCOUNTER
Haleigh from [address] [place] [address] calling to get recent OV notes for pt. Advised to fax over request and can send. Received request. Printed 1/9/23 OV and faxed to [phone]. Confirmation received.

## 2023-01-17 RX ORDER — ESCITALOPRAM OXALATE 20 MG/1
TABLET ORAL
Qty: 90 TABLET | Refills: 1 | Status: SHIPPED | OUTPATIENT
Start: 2023-01-17

## 2023-01-20 ENCOUNTER — TELEPHONE (OUTPATIENT)
Dept: FAMILY MEDICINE CLINIC | Facility: CLINIC | Age: 83
End: 2023-01-20

## 2023-01-20 DIAGNOSIS — R68.89 FORGETFULNESS: Primary | ICD-10-CM

## 2023-01-20 NOTE — TELEPHONE ENCOUNTER
Blood test and urine test ordered  Patient should get it done at [address]  Referral for Chela Calvert is in the chart, daughter to call and make appointment

## 2023-01-20 NOTE — TELEPHONE ENCOUNTER
Carmen Steward 11 calling stating the daughter has reported the patient has become increasingly forgetful and confused. Worried he could have an infection of some sort. Daughter would like bloodwork ordered as well as a urinalysis. Additionally, they would like a neurological exam done on pt as well.      Please advise

## 2023-01-23 ENCOUNTER — TELEPHONE (OUTPATIENT)
Dept: HEMATOLOGY/ONCOLOGY | Facility: HOSPITAL | Age: 83
End: 2023-01-23

## 2023-01-24 LAB
CREATININE, RANDOM URINE: 91 MG/DL (ref 20–320)
MICROALBUMIN/CREATININE RATIO, RANDOM URINE: 2077 MCG/MG CREAT
MICROALBUMIN: 189 MG/DL

## 2023-01-25 ENCOUNTER — TELEPHONE (OUTPATIENT)
Dept: FAMILY MEDICINE CLINIC | Facility: CLINIC | Age: 83
End: 2023-01-25

## 2023-01-25 LAB
BILIRUBIN: NEGATIVE
COLOR: YELLOW
KETONES: NEGATIVE
NITRITE: NEGATIVE
OCCULT BLOOD: NEGATIVE
PH: 7 (ref 5–8)
SPECIFIC GRAVITY: 1.02 (ref 1–1.03)

## 2023-01-25 NOTE — TELEPHONE ENCOUNTER
Pt's daughter dropped off paperwork to be completed by Dr Leslie Qureshi. Once completed fax to Dr Zarina Manning at [phone] as per daughter. Let daughter know it was faxed. She does not need copy. P # [phone]    Paperwork: For diabetic shoes. Paperwork placed in Dr Seth Rogers in box in [facility]

## 2023-01-25 NOTE — TELEPHONE ENCOUNTER
TERRENCE - sent to Dr. Fortunato Ontiveros  Please fax the paperwork once completed by Dr. Fortunato Ontiveros.

## 2023-01-26 ENCOUNTER — MED REC SCAN ONLY (OUTPATIENT)
Dept: FAMILY MEDICINE CLINIC | Facility: CLINIC | Age: 83
End: 2023-01-26

## 2023-01-27 NOTE — TELEPHONE ENCOUNTER
Form faxed to Dr. Chelle Diaz office at [phone] as requested. Confirmation fax received. Patient's daughter notified. She is asking if Dr. Shane Gerardo has received patient's lab result yet. States urine testing was done at [facility]. [facility] was unable to find a vein to draw blood but patient just had blood draw at [facility]. Lab results are in [place].     LOV 1/9/23   HFU Back pain  +4

## 2023-02-01 RX ORDER — ROPINIROLE 1 MG/1
1 TABLET, FILM COATED ORAL NIGHTLY
Qty: 90 TABLET | Refills: 0 | Status: SHIPPED | OUTPATIENT
Start: 2023-02-01

## 2023-02-02 DIAGNOSIS — M54.41 CHRONIC RIGHT-SIDED LOW BACK PAIN WITH RIGHT-SIDED SCIATICA: ICD-10-CM

## 2023-02-02 DIAGNOSIS — G89.29 CHRONIC RIGHT-SIDED LOW BACK PAIN WITH RIGHT-SIDED SCIATICA: ICD-10-CM

## 2023-02-03 RX ORDER — GABAPENTIN 300 MG/1
300 CAPSULE ORAL 3 TIMES DAILY
Qty: 270 CAPSULE | Refills: 0 | Status: SHIPPED | OUTPATIENT
Start: 2023-02-03

## 2023-02-06 ENCOUNTER — TELEPHONE (OUTPATIENT)
Dept: HEMATOLOGY/ONCOLOGY | Facility: HOSPITAL | Age: 83
End: 2023-02-06

## 2023-02-13 ENCOUNTER — MED REC SCAN ONLY (OUTPATIENT)
Dept: FAMILY MEDICINE CLINIC | Facility: CLINIC | Age: 83
End: 2023-02-13

## 2023-02-13 ENCOUNTER — APPOINTMENT (OUTPATIENT)
Dept: GENERAL RADIOLOGY | Facility: HOSPITAL | Age: 83
End: 2023-02-13
Payer: MEDICAID

## 2023-02-13 ENCOUNTER — HOSPITAL ENCOUNTER (EMERGENCY)
Facility: HOSPITAL | Age: 83
Discharge: HOME | End: 2023-02-13
Attending: EMERGENCY MEDICINE
Payer: MEDICAID

## 2023-02-13 ENCOUNTER — APPOINTMENT (OUTPATIENT)
Dept: CT IMAGING | Facility: HOSPITAL | Age: 83
End: 2023-02-13
Attending: EMERGENCY MEDICINE
Payer: MEDICAID

## 2023-02-13 VITALS
TEMPERATURE: 98 F | SYSTOLIC BLOOD PRESSURE: 158 MMHG | HEIGHT: 69 IN | OXYGEN SATURATION: 94 % | BODY MASS INDEX: 24.5 KG/M2 | HEART RATE: 68 BPM | DIASTOLIC BLOOD PRESSURE: 70 MMHG | WEIGHT: 165.38 LBS | RESPIRATION RATE: 14 BRPM

## 2023-02-13 DIAGNOSIS — S09.90XA INJURY OF HEAD, INITIAL ENCOUNTER: Primary | ICD-10-CM

## 2023-02-13 DIAGNOSIS — S20.212A CONTUSION OF LEFT CHEST WALL, INITIAL ENCOUNTER: ICD-10-CM

## 2023-02-13 PROBLEM — N18.1 CKD (CHRONIC KIDNEY DISEASE) STAGE 1, GFR 90 ML/MIN OR GREATER: Status: ACTIVE | Noted: 2023-01-25

## 2023-02-13 PROCEDURE — 71101 X-RAY EXAM UNILAT RIBS/CHEST: CPT

## 2023-02-13 PROCEDURE — 99284 EMERGENCY DEPT VISIT MOD MDM: CPT

## 2023-02-13 PROCEDURE — 70450 CT HEAD/BRAIN W/O DYE: CPT | Performed by: EMERGENCY MEDICINE

## 2023-02-13 RX ORDER — HYDROCODONE BITARTRATE AND ACETAMINOPHEN 5; 325 MG/1; MG/1
1 TABLET ORAL EVERY 6 HOURS PRN
Qty: 10 TABLET | Refills: 0 | Status: SHIPPED | OUTPATIENT
Start: 2023-02-13

## 2023-02-13 RX ORDER — CYCLOBENZAPRINE HCL 5 MG
5 TABLET ORAL NIGHTLY
Qty: 20 TABLET | Refills: 0 | Status: SHIPPED | OUTPATIENT
Start: 2023-02-13

## 2023-02-13 RX ORDER — LIDOCAINE 50 MG/G
2 PATCH TOPICAL EVERY 24 HOURS
Qty: 14 PATCH | Refills: 0 | Status: SHIPPED | OUTPATIENT
Start: 2023-02-13 | End: 2023-02-20

## 2023-02-13 RX ORDER — HYDROCODONE BITARTRATE AND ACETAMINOPHEN 5; 325 MG/1; MG/1
1 TABLET ORAL ONCE
Status: COMPLETED | OUTPATIENT
Start: 2023-02-13 | End: 2023-02-13

## 2023-02-13 NOTE — DISCHARGE INSTRUCTIONS
Continue using the lidocaine patches, take the Norco if continued symptoms. Follow-up with your primary care physician if return if any severe headache, dizziness, confusion or any other concerns return here immediately to the emergency room. You were seen in the emergency room in a limited time. There is a possibility that although we do not see any acute process at this present time that things can change with time. Is therefore imperative that you follow-up with primary care physician for close follow-up. If there is any significant progression of your pain  or other symptoms you to return immediately to the emergency room.

## 2023-02-13 NOTE — ED INITIAL ASSESSMENT (HPI)
Pt states had a fall this morning while walking to the BR, denies c/o dizziness or syncope. Contusion to back of his head, c/o left rib pain.

## 2023-02-14 NOTE — ED QUICK NOTES
Pt ambulatory around room with personal walker without complaints or issues. Pt feels safe going home today.

## 2023-02-15 ENCOUNTER — OFFICE VISIT (OUTPATIENT)
Dept: HEMATOLOGY/ONCOLOGY | Facility: HOSPITAL | Age: 83
End: 2023-02-15
Attending: INTERNAL MEDICINE
Payer: MEDICAID

## 2023-02-15 VITALS
HEART RATE: 83 BPM | OXYGEN SATURATION: 99 % | TEMPERATURE: 98 F | WEIGHT: 169 LBS | RESPIRATION RATE: 16 BRPM | SYSTOLIC BLOOD PRESSURE: 155 MMHG | BODY MASS INDEX: 25 KG/M2 | DIASTOLIC BLOOD PRESSURE: 68 MMHG

## 2023-02-15 DIAGNOSIS — C90.00 MULTIPLE MYELOMA NOT HAVING ACHIEVED REMISSION (HCC): Primary | ICD-10-CM

## 2023-02-15 LAB
ALBUMIN SERPL-MCNC: 4 G/DL (ref 3.4–5)
ALBUMIN/GLOB SERPL: 1 {RATIO} (ref 1–2)
ALP LIVER SERPL-CCNC: 69 U/L
ALT SERPL-CCNC: 20 U/L
ANION GAP SERPL CALC-SCNC: 11 MMOL/L (ref 0–18)
AST SERPL-CCNC: 27 U/L (ref 15–37)
BASOPHILS # BLD AUTO: 0.07 X10(3) UL (ref 0–0.2)
BASOPHILS NFR BLD AUTO: 1.4 %
BILIRUB SERPL-MCNC: 0.3 MG/DL (ref 0.1–2)
BUN BLD-MCNC: 70 MG/DL (ref 7–18)
CALCIUM BLD-MCNC: 9.5 MG/DL (ref 8.5–10.1)
CHLORIDE SERPL-SCNC: 99 MMOL/L (ref 98–112)
CO2 SERPL-SCNC: 28 MMOL/L (ref 21–32)
CREAT BLD-MCNC: 7.29 MG/DL
EOSINOPHIL # BLD AUTO: 0.15 X10(3) UL (ref 0–0.7)
EOSINOPHIL NFR BLD AUTO: 3.1 %
ERYTHROCYTE [DISTWIDTH] IN BLOOD BY AUTOMATED COUNT: 13.8 %
GFR SERPLBLD BASED ON 1.73 SQ M-ARVRAT: 7 ML/MIN/1.73M2 (ref 60–?)
GLOBULIN PLAS-MCNC: 4.2 G/DL (ref 2.8–4.4)
GLUCOSE BLD-MCNC: 80 MG/DL (ref 70–99)
HCT VFR BLD AUTO: 34.2 %
HGB BLD-MCNC: 11.9 G/DL
IMM GRANULOCYTES # BLD AUTO: 0.01 X10(3) UL (ref 0–1)
IMM GRANULOCYTES NFR BLD: 0.2 %
LYMPHOCYTES # BLD AUTO: 2.1 X10(3) UL (ref 1–4)
LYMPHOCYTES NFR BLD AUTO: 43.2 %
MCH RBC QN AUTO: 34.9 PG (ref 26–34)
MCHC RBC AUTO-ENTMCNC: 34.8 G/DL (ref 31–37)
MCV RBC AUTO: 100.3 FL
MONOCYTES # BLD AUTO: 0.66 X10(3) UL (ref 0.1–1)
MONOCYTES NFR BLD AUTO: 13.6 %
NEUTROPHILS # BLD AUTO: 1.87 X10 (3) UL (ref 1.5–7.7)
NEUTROPHILS # BLD AUTO: 1.87 X10(3) UL (ref 1.5–7.7)
NEUTROPHILS NFR BLD AUTO: 38.5 %
OSMOLALITY SERPL CALC.SUM OF ELEC: 305 MOSM/KG (ref 275–295)
PLATELET # BLD AUTO: 111 10(3)UL (ref 150–450)
POTASSIUM SERPL-SCNC: 4.7 MMOL/L (ref 3.5–5.1)
PROT SERPL-MCNC: 8.2 G/DL (ref 6.4–8.2)
RBC # BLD AUTO: 3.41 X10(6)UL
SODIUM SERPL-SCNC: 138 MMOL/L (ref 136–145)
WBC # BLD AUTO: 4.9 X10(3) UL (ref 4–11)

## 2023-02-15 PROCEDURE — 99205 OFFICE O/P NEW HI 60 MIN: CPT | Performed by: INTERNAL MEDICINE

## 2023-02-15 RX ORDER — LENALIDOMIDE 5 MG/1
CAPSULE ORAL
Qty: 30 CAPSULE | Refills: 0 | Status: SHIPPED | OUTPATIENT
Start: 2023-02-15

## 2023-02-15 NOTE — PATIENT INSTRUCTIONS
For triage nurse: [phone] Monday through Friday 7:30-5:00.  *Please note this is a new phone number*    After hours or weekends for emergent needs:  [phone].      To schedule diagnostic testing: Central [phone]    For Medical Records: [phone]

## 2023-02-17 LAB
ALBUMIN SERPL ELPH-MCNC: 4.76 G/DL (ref 3.75–5.21)
ALBUMIN/GLOB SERPL: 1.38 {RATIO} (ref 1–2)
ALPHA1 GLOB SERPL ELPH-MCNC: 0.3 G/DL (ref 0.19–0.46)
ALPHA2 GLOB SERPL ELPH-MCNC: 0.86 G/DL (ref 0.48–1.05)
B-GLOBULIN SERPL ELPH-MCNC: 0.82 G/DL (ref 0.68–1.23)
GAMMA GLOB SERPL ELPH-MCNC: 1.46 G/DL (ref 0.62–1.7)
KAPPA LC FREE SER-MCNC: 43.73 MG/DL (ref 0.33–1.94)
KAPPA LC FREE/LAMBDA FREE SER NEPH: 2.01 {RATIO} (ref 0.26–1.65)
LAMBDA LC FREE SERPL-MCNC: 21.77 MG/DL (ref 0.57–2.63)
PROT SERPL-MCNC: 8.2 G/DL (ref 6.4–8.2)

## 2023-02-20 ENCOUNTER — TELEPHONE (OUTPATIENT)
Dept: HEMATOLOGY/ONCOLOGY | Facility: HOSPITAL | Age: 83
End: 2023-02-20

## 2023-02-20 NOTE — TELEPHONE ENCOUNTER
[facility] called needing clartification on the Revlimid prescription. Please call [phone] [facility]#[id number]. Thank you.

## 2023-02-22 ENCOUNTER — APPOINTMENT (OUTPATIENT)
Dept: HEMATOLOGY/ONCOLOGY | Facility: HOSPITAL | Age: 83
End: 2023-02-22
Attending: INTERNAL MEDICINE
Payer: MEDICAID

## 2023-02-26 ENCOUNTER — PATIENT MESSAGE (OUTPATIENT)
Dept: FAMILY MEDICINE CLINIC | Facility: CLINIC | Age: 83
End: 2023-02-26

## 2023-02-27 ENCOUNTER — OFFICE VISIT (OUTPATIENT)
Dept: FAMILY MEDICINE CLINIC | Facility: CLINIC | Age: 83
End: 2023-02-27
Payer: MEDICAID

## 2023-02-27 VITALS
DIASTOLIC BLOOD PRESSURE: 48 MMHG | WEIGHT: 169 LBS | OXYGEN SATURATION: 94 % | HEART RATE: 78 BPM | BODY MASS INDEX: 25.03 KG/M2 | RESPIRATION RATE: 16 BRPM | HEIGHT: 69 IN | SYSTOLIC BLOOD PRESSURE: 130 MMHG | TEMPERATURE: 97 F

## 2023-02-27 DIAGNOSIS — R07.81 RIB PAIN ON LEFT SIDE: ICD-10-CM

## 2023-02-27 DIAGNOSIS — K59.00 CONSTIPATION, UNSPECIFIED CONSTIPATION TYPE: ICD-10-CM
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
## 2023-02-27 DIAGNOSIS — S50.819A ABRASION FOREARM: ICD-10-CM

## 2023-02-27 RX ORDER — MELOXICAM 7.5 MG/1
7.5 TABLET ORAL DAILY
Qty: 7 TABLET | Refills: 0 | Status: SHIPPED | OUTPATIENT
Start: 2023-02-27

## 2023-02-27 RX ORDER — PREDNISONE 20 MG/1
20 TABLET ORAL DAILY
Qty: 5 TABLET | Refills: 0 | Status: SHIPPED | OUTPATIENT
Start: 2023-02-27 | End: 2023-03-04

## 2023-02-27 NOTE — TELEPHONE ENCOUNTER
Spoke to patient's daughter,    Who fell 2/26/23 am  Scratched left arm  Left side abdomen pain   Rates pain as 8 out of 10  \"A little dizziness\"  Blood pressure: 114/59  Patients daughter has not given him his hydralazine today 2/27/23 or yesterday 2/26/23    Denies:  Chest pain  Difficulty breathing     Would you advise ER or would you like to see him in office today 2/27/23?

## 2023-02-27 NOTE — TELEPHONE ENCOUNTER
Spoke with Anjolie from [facility] called to report pt. Had a fall on 2/26/23 pt. Has open wound on Lt. Lower arm. Requesting order for Physical Therapy to be faxed to [phone].

## 2023-03-04 ENCOUNTER — MED REC SCAN ONLY (OUTPATIENT)
Dept: FAMILY MEDICINE CLINIC | Facility: CLINIC | Age: 83
End: 2023-03-04

## 2023-03-08 ENCOUNTER — TELEPHONE (OUTPATIENT)
Dept: FAMILY MEDICINE CLINIC | Facility: CLINIC | Age: 83
End: 2023-03-08

## 2023-03-08 ENCOUNTER — PATIENT MESSAGE (OUTPATIENT)
Dept: FAMILY MEDICINE CLINIC | Facility: CLINIC | Age: 83
End: 2023-03-08

## 2023-03-08 RX ORDER — CYCLOBENZAPRINE HCL 5 MG
5 TABLET ORAL NIGHTLY
Qty: 20 TABLET | Refills: 0 | Status: SHIPPED | OUTPATIENT
Start: 2023-03-08

## 2023-03-08 RX ORDER — MELOXICAM 7.5 MG/1
7.5 TABLET ORAL DAILY
Qty: 7 TABLET | Refills: 0 | Status: SHIPPED | OUTPATIENT
Start: 2023-03-08

## 2023-03-08 NOTE — TELEPHONE ENCOUNTER
[facility] called saying they want to know who is facilitating the patient's xray that should have been ordered. Pt has fallen and is hh nurse wants to do a x-ray. There was a note put in on 3/3 regarding this matter.

## 2023-03-08 NOTE — TELEPHONE ENCOUNTER
Last ordered: 1 week ago by Kylah Arriaga MD      Patient comment: he needs a refill on cyclobenzaprine as well

## 2023-03-08 NOTE — TELEPHONE ENCOUNTER
Called back Kin Cj @[phone] as provided. No answer. Voicemail is full, unable to leave message. Noted pt had initial fall on 2/13/23 and seen in ER. XR of ribs and head CT completed- negative. Not clear if pt had another fall and where the area of concern is on pt, need additional information. Dr. Reilly Barrientos- have you received any order request from [facility] for this pt? Would you like to order an XR? Please advise. Thank you.

## 2023-03-08 NOTE — TELEPHONE ENCOUNTER
Home health nurse called asking for last progress notes to be faxed to #[phone], is pt currently under home health care with [facility]\"? Ok to fax progress notes?

## 2023-03-08 NOTE — TELEPHONE ENCOUNTER
Yes I will approve for the x-rays but I would like to know what his injury? Head back neck shoulders hips knees leg?

## 2023-03-09 ENCOUNTER — PATIENT MESSAGE (OUTPATIENT)
Dept: FAMILY MEDICINE CLINIC | Facility: CLINIC | Age: 83
End: 2023-03-09

## 2023-03-09 RX ORDER — HYDROCODONE BITARTRATE AND ACETAMINOPHEN 10; 325 MG/1; MG/1
1 TABLET ORAL EVERY 8 HOURS PRN
Qty: 30 TABLET | Refills: 0 | Status: SHIPPED | OUTPATIENT
Start: 2023-03-09

## 2023-03-09 NOTE — TELEPHONE ENCOUNTER
From: Sina Jules  To: Loren Alford MD  Sent: 3/8/2023 11:59 AM CST  Subject: pain    The home health nurse called to inform me that finally the Dr signed for an approval for home X-ray for my dad. he is in a lot of pain and hurt s more with movement. He refuses to got to the Er. I would really appreciate if you could have Dr Nia Tee sign the approval for the mobility bed for my dad as it is getting very hard for him to get in and out of bed. Also for the cane. I would be grateful if you could expedite it and call me with any questions. Thank you!

## 2023-03-09 NOTE — TELEPHONE ENCOUNTER
Per clinical supervisor, RON, indicted received verbal ok from PCP for pt's XR request.  Received left rib XR results from [address] [facility] services- no acute pathology. Correlate with areas of concerns and repeat as needed. Report placed in PCP bin to review. Also received DME order request for hospital bed. Form placed in PCP bin to review and sign. Thank you.

## 2023-03-10 ENCOUNTER — TELEPHONE (OUTPATIENT)
Dept: FAMILY MEDICINE CLINIC | Facility: CLINIC | Age: 83
End: 2023-03-10

## 2023-03-10 NOTE — TELEPHONE ENCOUNTER
Order for [facility] hospital bed faxed to [facility] at [phone], confirmation received. X-ray results relayed with [employer] message. Paperwork sent to scan.

## 2023-03-10 NOTE — TELEPHONE ENCOUNTER
Office visit faxed to [facility] at the number provided below. In the message, noted face sheet will be faxed separate. PSR - Please fax the face sheet to the number below. Thank you!

## 2023-03-10 NOTE — TELEPHONE ENCOUNTER
Attempted to call Gema Plunkett back, it gives me busy signal. If they return call. Results received and patient daughter notified of results.

## 2023-03-10 NOTE — TELEPHONE ENCOUNTER
Spoke with Laron Mccray- desiree to confirm if provider received pts. X-Ray results that were faxed to office on 3/8/23.

## 2023-03-10 NOTE — TELEPHONE ENCOUNTER
From: Rahul Connelly  To: Jolly Perez MD  Sent: 3/9/2023 4:26 PM CST  Subject: urgent    Hello, I am not sure why it is taking so long for Dr Harriett Dandy to sign the paper work for my dad's bed. and cane. It took a long time for I'm to sign for the x ray. I have been kept informed by the insurance about the correspondence between your office and the home health care. Please know that my dad is in a lot of pain and not the kind to take pills un necessarily. His bruising was also not taken very seriously and that is concerning for me. I would really appreciate if you could attend to his concerns at your earliest convenience. Thank you.

## 2023-03-10 NOTE — TELEPHONE ENCOUNTER
Son from [address] [facility]. Said he received a fax of signed orders for a hospital bed today. Needs the Diagnosis Code & recent progress notes faxed to  along with the Pt's Demographic face sheet - which shows insurance. Fax to Zoe at [phone]. Special note - prior to faxing ask  to print out face sheet from Pt's chart.

## 2023-03-20 RX ORDER — LENALIDOMIDE 5 MG/1
CAPSULE ORAL
Qty: 30 CAPSULE | Refills: 0 | Status: SHIPPED | OUTPATIENT
Start: 2023-03-20

## 2023-03-21 ENCOUNTER — APPOINTMENT (OUTPATIENT)
Dept: HEMATOLOGY/ONCOLOGY | Facility: HOSPITAL | Age: 83
End: 2023-03-21
Attending: INTERNAL MEDICINE
Payer: MEDICAID

## 2023-03-22 ENCOUNTER — OFFICE VISIT (OUTPATIENT)
Dept: HEMATOLOGY/ONCOLOGY | Facility: HOSPITAL | Age: 83
End: 2023-03-22
Attending: INTERNAL MEDICINE
Payer: MEDICAID

## 2023-03-22 VITALS
HEART RATE: 66 BPM | TEMPERATURE: 98 F | RESPIRATION RATE: 18 BRPM | SYSTOLIC BLOOD PRESSURE: 143 MMHG | OXYGEN SATURATION: 99 % | WEIGHT: 166 LBS | BODY MASS INDEX: 25 KG/M2 | DIASTOLIC BLOOD PRESSURE: 58 MMHG

## 2023-03-22 DIAGNOSIS — C90.00 MULTIPLE MYELOMA NOT HAVING ACHIEVED REMISSION (HCC): Primary | ICD-10-CM

## 2023-03-22 LAB
ALBUMIN SERPL-MCNC: 3.9 G/DL (ref 3.4–5)
ALBUMIN/GLOB SERPL: 1 {RATIO} (ref 1–2)
ALP LIVER SERPL-CCNC: 72 U/L
ALT SERPL-CCNC: 15 U/L
ANION GAP SERPL CALC-SCNC: 6 MMOL/L (ref 0–18)
AST SERPL-CCNC: 18 U/L (ref 15–37)
BASOPHILS # BLD AUTO: 0.06 X10(3) UL (ref 0–0.2)
BASOPHILS NFR BLD AUTO: 1.4 %
BILIRUB SERPL-MCNC: 0.4 MG/DL (ref 0.1–2)
BUN BLD-MCNC: 53 MG/DL (ref 7–18)
CALCIUM BLD-MCNC: 9.4 MG/DL (ref 8.5–10.1)
CHLORIDE SERPL-SCNC: 100 MMOL/L (ref 98–112)
CO2 SERPL-SCNC: 31 MMOL/L (ref 21–32)
CREAT BLD-MCNC: 6.49 MG/DL
EOSINOPHIL # BLD AUTO: 0.06 X10(3) UL (ref 0–0.7)
EOSINOPHIL NFR BLD AUTO: 1.4 %
ERYTHROCYTE [DISTWIDTH] IN BLOOD BY AUTOMATED COUNT: 14.9 %
FASTING STATUS PATIENT QL REPORTED: NO
GFR SERPLBLD BASED ON 1.73 SQ M-ARVRAT: 8 ML/MIN/1.73M2 (ref 60–?)
GLOBULIN PLAS-MCNC: 4.1 G/DL (ref 2.8–4.4)
GLUCOSE BLD-MCNC: 126 MG/DL (ref 70–99)
HCT VFR BLD AUTO: 27.8 %
HGB BLD-MCNC: 9.4 G/DL
IMM GRANULOCYTES # BLD AUTO: 0.01 X10(3) UL (ref 0–1)
IMM GRANULOCYTES NFR BLD: 0.2 %
LYMPHOCYTES # BLD AUTO: 1.42 X10(3) UL (ref 1–4)
LYMPHOCYTES NFR BLD AUTO: 32.6 %
MCH RBC QN AUTO: 34.9 PG (ref 26–34)
MCHC RBC AUTO-ENTMCNC: 33.8 G/DL (ref 31–37)
MCV RBC AUTO: 103.3 FL
MONOCYTES # BLD AUTO: 0.41 X10(3) UL (ref 0.1–1)
MONOCYTES NFR BLD AUTO: 9.4 %
NEUTROPHILS # BLD AUTO: 2.39 X10 (3) UL (ref 1.5–7.7)
NEUTROPHILS # BLD AUTO: 2.39 X10(3) UL (ref 1.5–7.7)
NEUTROPHILS NFR BLD AUTO: 55 %
OSMOLALITY SERPL CALC.SUM OF ELEC: 300 MOSM/KG (ref 275–295)
PLATELET # BLD AUTO: 182 10(3)UL (ref 150–450)
POTASSIUM SERPL-SCNC: 4.5 MMOL/L (ref 3.5–5.1)
PROT SERPL-MCNC: 8 G/DL (ref 6.4–8.2)
RBC # BLD AUTO: 2.69 X10(6)UL
SODIUM SERPL-SCNC: 137 MMOL/L (ref 136–145)
WBC # BLD AUTO: 4.4 X10(3) UL (ref 4–11)

## 2023-03-22 PROCEDURE — 99214 OFFICE O/P EST MOD 30 MIN: CPT | Performed by: INTERNAL MEDICINE

## 2023-03-22 NOTE — PROGRESS NOTES
Patient is here today for follow up with  for Multiple Myeloma - Revlimid 5mg 14 days on followed by 14 days off. Patient denies pain. Fatigued. Patient is also on Dialysis three times weekly. Medication list,medical history and toxicities were reviewed and updated. Education Record    Learner:  Patient and daughter    Disease / Diagnosis: Revlimid     Barriers / Limitations:  None   Comments:    Method:  Brief focused, Discussion, Printed material and Reinforcement   Comments:    General Topics:  Medication, Pain, Procedure and Plan of care reviewed   Comments:    Outcome:  Shows understanding   Comments:      AVS provided and follow up reviewed. Patient instructed to call as needed.

## 2023-03-23 ENCOUNTER — TELEPHONE (OUTPATIENT)
Dept: HEMATOLOGY/ONCOLOGY | Facility: HOSPITAL | Age: 83
End: 2023-03-23

## 2023-03-23 NOTE — TELEPHONE ENCOUNTER
[facility], [facility] , Please call  [phone]- Liban Loza is calling for order clarification, Ref # [id number],.

## 2023-03-24 LAB
ALBUMIN SERPL ELPH-MCNC: 4.67 G/DL (ref 3.75–5.21)
ALBUMIN/GLOB SERPL: 1.49 {RATIO} (ref 1–2)
ALPHA1 GLOB SERPL ELPH-MCNC: 0.32 G/DL (ref 0.19–0.46)
ALPHA2 GLOB SERPL ELPH-MCNC: 0.83 G/DL (ref 0.48–1.05)
B-GLOBULIN SERPL ELPH-MCNC: 0.78 G/DL (ref 0.68–1.23)
GAMMA GLOB SERPL ELPH-MCNC: 1.2 G/DL (ref 0.62–1.7)
KAPPA LC FREE SER-MCNC: 32.57 MG/DL (ref 0.33–1.94)
KAPPA LC FREE/LAMBDA FREE SER NEPH: 1.58 {RATIO} (ref 0.26–1.65)
LAMBDA LC FREE SERPL-MCNC: 20.61 MG/DL (ref 0.57–2.63)
PROT SERPL-MCNC: 7.8 G/DL (ref 6.4–8.2)

## 2023-04-05 ENCOUNTER — MED REC SCAN ONLY (OUTPATIENT)
Dept: FAMILY MEDICINE CLINIC | Facility: CLINIC | Age: 83
End: 2023-04-05

## 2023-04-19 RX ORDER — LENALIDOMIDE 5 MG/1
CAPSULE ORAL
Qty: 14 CAPSULE | Refills: 0 | Status: SHIPPED | OUTPATIENT
Start: 2023-04-19 | End: 2023-04-24 | Stop reason: ALTCHOICE

## 2023-04-24 ENCOUNTER — OFFICE VISIT (OUTPATIENT)
Dept: NEUROLOGY | Facility: CLINIC | Age: 83
End: 2023-04-24
Payer: MEDICAID

## 2023-04-24 VITALS
BODY MASS INDEX: 25 KG/M2 | RESPIRATION RATE: 16 BRPM | WEIGHT: 166.63 LBS | SYSTOLIC BLOOD PRESSURE: 158 MMHG | HEART RATE: 82 BPM | DIASTOLIC BLOOD PRESSURE: 82 MMHG

## 2023-04-24 DIAGNOSIS — F01.B0 MODERATE VASCULAR DEMENTIA, UNSPECIFIED WHETHER BEHAVIORAL, PSYCHOTIC, OR MOOD DISTURBANCE OR ANXIETY (HCC): ICD-10-CM

## 2023-04-24 DIAGNOSIS — G62.9 PERIPHERAL POLYNEUROPATHY: ICD-10-CM

## 2023-04-24 PROBLEM — F02.80 ALZHEIMER'S DISEASE, UNSPECIFIED (HCC): Status: ACTIVE | Noted: 2023-04-24

## 2023-04-24 PROBLEM — G30.9 ALZHEIMER'S DISEASE, UNSPECIFIED (HCC): Status: ACTIVE | Noted: 2023-04-24

## 2023-04-24 PROCEDURE — 99204 OFFICE O/P NEW MOD 45 MIN: CPT | Performed by: OTHER

## 2023-04-24 PROCEDURE — 3079F DIAST BP 80-89 MM HG: CPT | Performed by: OTHER

## 2023-04-24 PROCEDURE — 3077F SYST BP >= 140 MM HG: CPT | Performed by: OTHER

## 2023-04-24 RX ORDER — DONEPEZIL HYDROCHLORIDE 10 MG/1
10 TABLET, FILM COATED ORAL NIGHTLY
Qty: 30 TABLET | Refills: 2 | Status: SHIPPED | OUTPATIENT
Start: 2023-04-24

## 2023-05-01 RX ORDER — ROPINIROLE 1 MG/1
1 TABLET, FILM COATED ORAL NIGHTLY
Qty: 90 TABLET | Refills: 0 | Status: SHIPPED | OUTPATIENT
Start: 2023-05-01

## 2023-05-03 ENCOUNTER — OFFICE VISIT (OUTPATIENT)
Dept: HEMATOLOGY/ONCOLOGY | Facility: HOSPITAL | Age: 83
End: 2023-05-03
Attending: INTERNAL MEDICINE
Payer: MEDICAID

## 2023-05-03 VITALS
BODY MASS INDEX: 25 KG/M2 | TEMPERATURE: 98 F | HEART RATE: 70 BPM | WEIGHT: 167.19 LBS | OXYGEN SATURATION: 97 % | DIASTOLIC BLOOD PRESSURE: 83 MMHG | SYSTOLIC BLOOD PRESSURE: 127 MMHG | RESPIRATION RATE: 18 BRPM

## 2023-05-03 DIAGNOSIS — C90.00 MULTIPLE MYELOMA NOT HAVING ACHIEVED REMISSION (HCC): Primary | ICD-10-CM

## 2023-05-03 LAB
ALBUMIN SERPL-MCNC: 3.7 G/DL (ref 3.4–5)
ALBUMIN/GLOB SERPL: 0.9 {RATIO} (ref 1–2)
ALP LIVER SERPL-CCNC: 84 U/L
ALT SERPL-CCNC: 25 U/L
ANION GAP SERPL CALC-SCNC: 4 MMOL/L (ref 0–18)
AST SERPL-CCNC: 20 U/L (ref 15–37)
BASOPHILS # BLD AUTO: 0.02 X10(3) UL (ref 0–0.2)
BASOPHILS NFR BLD AUTO: 0.4 %
BILIRUB SERPL-MCNC: 0.4 MG/DL (ref 0.1–2)
BUN BLD-MCNC: 50 MG/DL (ref 7–18)
CALCIUM BLD-MCNC: 8.9 MG/DL (ref 8.5–10.1)
CHLORIDE SERPL-SCNC: 104 MMOL/L (ref 98–112)
CO2 SERPL-SCNC: 29 MMOL/L (ref 21–32)
CREAT BLD-MCNC: 5.91 MG/DL
EOSINOPHIL # BLD AUTO: 0.55 X10(3) UL (ref 0–0.7)
EOSINOPHIL NFR BLD AUTO: 10.9 %
ERYTHROCYTE [DISTWIDTH] IN BLOOD BY AUTOMATED COUNT: 13.8 %
FASTING STATUS PATIENT QL REPORTED: NO
GFR SERPLBLD BASED ON 1.73 SQ M-ARVRAT: 9 ML/MIN/1.73M2 (ref 60–?)
GLOBULIN PLAS-MCNC: 4 G/DL (ref 2.8–4.4)
GLUCOSE BLD-MCNC: 117 MG/DL (ref 70–99)
HCT VFR BLD AUTO: 32.1 %
HGB BLD-MCNC: 10.7 G/DL
IMM GRANULOCYTES # BLD AUTO: 0.02 X10(3) UL (ref 0–1)
IMM GRANULOCYTES NFR BLD: 0.4 %
LYMPHOCYTES # BLD AUTO: 1.96 X10(3) UL (ref 1–4)
LYMPHOCYTES NFR BLD AUTO: 39 %
MCH RBC QN AUTO: 33.6 PG (ref 26–34)
MCHC RBC AUTO-ENTMCNC: 33.3 G/DL (ref 31–37)
MCV RBC AUTO: 100.9 FL
MONOCYTES # BLD AUTO: 0.52 X10(3) UL (ref 0.1–1)
MONOCYTES NFR BLD AUTO: 10.3 %
NEUTROPHILS # BLD AUTO: 1.96 X10 (3) UL (ref 1.5–7.7)
NEUTROPHILS # BLD AUTO: 1.96 X10(3) UL (ref 1.5–7.7)
NEUTROPHILS NFR BLD AUTO: 39 %
OSMOLALITY SERPL CALC.SUM OF ELEC: 298 MOSM/KG (ref 275–295)
PLATELET # BLD AUTO: 119 10(3)UL (ref 150–450)
POTASSIUM SERPL-SCNC: 3.9 MMOL/L (ref 3.5–5.1)
PROT SERPL-MCNC: 7.7 G/DL (ref 6.4–8.2)
RBC # BLD AUTO: 3.18 X10(6)UL
SODIUM SERPL-SCNC: 137 MMOL/L (ref 136–145)
WBC # BLD AUTO: 5 X10(3) UL (ref 4–11)

## 2023-05-03 PROCEDURE — 99214 OFFICE O/P EST MOD 30 MIN: CPT | Performed by: INTERNAL MEDICINE

## 2023-05-03 NOTE — PROGRESS NOTES
Patient is here for MD f/u for Multiple Myeloma. Patient continues on Revlimid 5 mg. Patient c/o chronic neuropathy in hands and feet. Neurologist increased Gabapentin to 2 caps at bedtime and 1 cap in the morning. Patient is scheduled for a MRI brain on Friday. At that time, daughter will decide if patient will start Aricept. Appetite has decreased this past month. Tired and fatigued most of the time. Using a cane to ambulate. Denies any GI complaints.         Education Record    Learner:  Patient and Family Member    Disease / Diagnosis:  Multiple myeloma     Barriers / Limitations:  None   Comments:    Method:  Discussion   Comments:    General Topics:  Plan of care reviewed   Comments:    Outcome:  Shows understanding   Comments:

## 2023-05-04 ENCOUNTER — MED REC SCAN ONLY (OUTPATIENT)
Dept: FAMILY MEDICINE CLINIC | Facility: CLINIC | Age: 83
End: 2023-05-04

## 2023-05-04 ENCOUNTER — APPOINTMENT (OUTPATIENT)
Dept: HEMATOLOGY/ONCOLOGY | Facility: HOSPITAL | Age: 83
End: 2023-05-04
Attending: INTERNAL MEDICINE
Payer: MEDICAID

## 2023-05-05 ENCOUNTER — APPOINTMENT (OUTPATIENT)
Dept: GENERAL RADIOLOGY | Age: 83
End: 2023-05-05
Attending: NURSE PRACTITIONER
Payer: MEDICAID

## 2023-05-05 ENCOUNTER — HOSPITAL ENCOUNTER (OUTPATIENT)
Age: 83
Discharge: HOME | End: 2023-05-05
Payer: MEDICAID

## 2023-05-05 VITALS
TEMPERATURE: 98 F | DIASTOLIC BLOOD PRESSURE: 57 MMHG | SYSTOLIC BLOOD PRESSURE: 142 MMHG | HEART RATE: 74 BPM | RESPIRATION RATE: 22 BRPM | OXYGEN SATURATION: 95 %

## 2023-05-05 DIAGNOSIS — J32.9 SINOBRONCHITIS: Primary | ICD-10-CM

## 2023-05-05 DIAGNOSIS — J40 SINOBRONCHITIS: Primary | ICD-10-CM

## 2023-05-05 LAB
ALBUMIN SERPL ELPH-MCNC: 4.22 G/DL (ref 3.75–5.21)
ALBUMIN/GLOB SERPL: 1.42 {RATIO} (ref 1–2)
ALPHA1 GLOB SERPL ELPH-MCNC: 0.35 G/DL (ref 0.19–0.46)
ALPHA2 GLOB SERPL ELPH-MCNC: 0.78 G/DL (ref 0.48–1.05)
B-GLOBULIN SERPL ELPH-MCNC: 0.73 G/DL (ref 0.68–1.23)
GAMMA GLOB SERPL ELPH-MCNC: 1.12 G/DL (ref 0.62–1.7)
KAPPA LC FREE SER-MCNC: 42.63 MG/DL (ref 0.33–1.94)
KAPPA LC FREE/LAMBDA FREE SER NEPH: 1.83 {RATIO} (ref 0.26–1.65)
LAMBDA LC FREE SERPL-MCNC: 23.31 MG/DL (ref 0.57–2.63)
PROT SERPL-MCNC: 7.2 G/DL (ref 6.4–8.2)

## 2023-05-05 PROCEDURE — 71046 X-RAY EXAM CHEST 2 VIEWS: CPT | Performed by: NURSE PRACTITIONER

## 2023-05-05 PROCEDURE — 99213 OFFICE O/P EST LOW 20 MIN: CPT

## 2023-05-05 PROCEDURE — 99214 OFFICE O/P EST MOD 30 MIN: CPT

## 2023-05-05 RX ORDER — AZITHROMYCIN 250 MG/1
TABLET, FILM COATED ORAL
Qty: 6 TABLET | Refills: 0 | Status: SHIPPED | OUTPATIENT
Start: 2023-05-05

## 2023-05-05 RX ORDER — ALBUTEROL SULFATE 90 UG/1
2 AEROSOL, METERED RESPIRATORY (INHALATION) EVERY 4 HOURS PRN
Qty: 18 G | Refills: 0 | Status: SHIPPED | OUTPATIENT
Start: 2023-05-05

## 2023-05-05 NOTE — ED INITIAL ASSESSMENT (HPI)
Patient here for chest and nasal congestion x 1 week. Family member states PMD told them he may need chest xray for diminished lung sounds. States cough is productive with white phlegm. occasional headache reported as well. Being treated for myeloma currently. Does states he feels some shortness of breath as well.

## 2023-05-05 NOTE — DISCHARGE INSTRUCTIONS
Drink plenty fluids  Also consider using inhaler every 4 hours   Follow-up with your family doctor in 2-3 days if no better  Return to ED for any worsening or persisting symptoms, shortness of breath, chest pain, dizziness, fever.

## 2023-05-08 RX ORDER — CALCIUM ACETATE 667 MG/1
2 CAPSULE ORAL 3 TIMES DAILY
Qty: 180 CAPSULE | Refills: 0 | Status: SHIPPED | OUTPATIENT
Start: 2023-05-08

## 2023-05-11 DIAGNOSIS — M54.41 CHRONIC RIGHT-SIDED LOW BACK PAIN WITH RIGHT-SIDED SCIATICA: ICD-10-CM

## 2023-05-11 DIAGNOSIS — G89.29 CHRONIC RIGHT-SIDED LOW BACK PAIN WITH RIGHT-SIDED SCIATICA: ICD-10-CM

## 2023-05-11 DIAGNOSIS — G47.00 INSOMNIA, UNSPECIFIED TYPE: ICD-10-CM

## 2023-05-12 RX ORDER — GABAPENTIN 300 MG/1
300 CAPSULE ORAL 3 TIMES DAILY
Qty: 270 CAPSULE | Refills: 0 | Status: SHIPPED | OUTPATIENT
Start: 2023-05-12

## 2023-05-15 RX ORDER — ROSUVASTATIN CALCIUM 10 MG/1
10 TABLET, COATED ORAL NIGHTLY
Qty: 90 TABLET | Refills: 1 | Status: SHIPPED | OUTPATIENT
Start: 2023-05-15

## 2023-05-15 RX ORDER — TRAZODONE HYDROCHLORIDE 50 MG/1
50 TABLET ORAL NIGHTLY
Qty: 90 TABLET | Refills: 1 | Status: SHIPPED | OUTPATIENT
Start: 2023-05-15

## 2023-05-15 NOTE — TELEPHONE ENCOUNTER
LOV 2/27/2023    Future Appointments   Date Time Provider James Camila   5/19/2023 12:45 PM SUZY MR RM1 (1.5T) [address] [address]   8/2/2023 11:15 AM Anil Navarrete MD [address] [employer]   8/14/2023 10:50 AM MD SARAH Ma

## 2023-05-17 RX ORDER — LENALIDOMIDE 5 MG/1
1 CAPSULE ORAL DAILY
Qty: 14 CAPSULE | Refills: 0 | Status: SHIPPED | OUTPATIENT
Start: 2023-05-17

## 2023-05-19 ENCOUNTER — HOSPITAL ENCOUNTER (OUTPATIENT)
Dept: MRI IMAGING | Age: 83
Discharge: HOME | End: 2023-05-19
Attending: Other
Payer: MEDICAID

## 2023-05-19 DIAGNOSIS — F01.B0 MODERATE VASCULAR DEMENTIA, UNSPECIFIED WHETHER BEHAVIORAL, PSYCHOTIC, OR MOOD DISTURBANCE OR ANXIETY (HCC): ICD-10-CM

## 2023-05-19 PROCEDURE — 70551 MRI BRAIN STEM W/O DYE: CPT | Performed by: OTHER

## 2023-05-28 ENCOUNTER — PATIENT MESSAGE (OUTPATIENT)
Dept: NEUROLOGY | Facility: CLINIC | Age: 83
End: 2023-05-28

## 2023-05-30 NOTE — TELEPHONE ENCOUNTER
From: Sri Diaz  To: Dwight uQezada MD  Sent: 5/28/2023 11:50 AM CDT  Subject: donepezil    Hello,  My name is Jaky and I am Mr Lisa Espinosa daughter. I was really hoping that someone from the office would call us regarding his MRI result. Just to update, I finally gave my dad the first dose of aricept last night and after an hour of taking the med, he experienced confusion and could hardly walk. he was in a state of numbness. .we had to assist him getting him to his room and in the bed. I have decided that I am not going to continue with this medication.   Hoping to hear from you soon

## 2023-05-31 RX ORDER — AMLODIPINE BESYLATE 10 MG/1
10 TABLET ORAL DAILY
Qty: 90 TABLET | Refills: 1 | Status: SHIPPED | OUTPATIENT
Start: 2023-05-31

## 2023-05-31 RX ORDER — LOSARTAN POTASSIUM 100 MG/1
100 TABLET ORAL DAILY
Qty: 90 TABLET | Refills: 1 | Status: SHIPPED | OUTPATIENT
Start: 2023-05-31

## 2023-05-31 NOTE — TELEPHONE ENCOUNTER
LOV 2/27/2023      LAST LAB 5/3/23    LAST RX 12/5/22 90 tab 1 refill     Next OV   Future Appointments   Date Time Provider James Perezi   8/2/2023 11:15 AM Benson Spence MD [address] [email or web address]   8/14/2023 10:50 AM Vida Mahajan MD [facility]         PROTOCOL failed

## 2023-06-05 ENCOUNTER — MED REC SCAN ONLY (OUTPATIENT)
Dept: FAMILY MEDICINE CLINIC | Facility: CLINIC | Age: 83
End: 2023-06-05

## 2023-06-06 ENCOUNTER — APPOINTMENT (OUTPATIENT)
Dept: HEMATOLOGY/ONCOLOGY | Facility: HOSPITAL | Age: 83
End: 2023-06-06
Payer: MEDICAID

## 2023-06-14 RX ORDER — LENALIDOMIDE 5 MG/1
1 CAPSULE ORAL DAILY
Qty: 14 CAPSULE | Refills: 0 | Status: SHIPPED | OUTPATIENT
Start: 2023-06-14

## 2023-06-26 RX ORDER — FOLIC ACID 1 MG/1
1 TABLET ORAL DAILY
Qty: 90 TABLET | Refills: 0 | Status: SHIPPED | OUTPATIENT
Start: 2023-06-26

## 2023-06-26 NOTE — TELEPHONE ENCOUNTER
LOV 2/27/2023    Future Appointments   Date Time Provider James Jensen   8/2/2023 11:15 AM Nithin Camargo MD [facility] [employer]   8/14/2023 10:50 AM Shari Darden MD [facility] [place]   12/6/2023 11:40 AM George Lynn MD [facility] [place]     folic acid 1 MG Oral Tab 90 tablet 1 11/15/2022

## 2023-07-05 RX ORDER — ESCITALOPRAM OXALATE 20 MG/1
TABLET ORAL
Qty: 90 TABLET | Refills: 1 | Status: SHIPPED | OUTPATIENT
Start: 2023-07-05

## 2023-07-05 NOTE — TELEPHONE ENCOUNTER
escitalopram 20 MG Oral Tab         Si mg. 1 tablet    Disp: 90 tablet    Refills: 1    Start: 2023    Class: Normal    Non-formulary     LOV  23 dr Engle Check     LAST LAB n/a     LAST RX 23 90 with 1     Next OV   Future Appointments   Date Time Provider James Jensen   2023 11:15 AM Deidre Schmidt MD [employer]   2023 10:50 AM Shelton Wyatt MD [facility] [place]   2023 11:40 AM Sienna Johnston MD [facility] [place]         PROTOCOL none

## 2023-07-11 RX ORDER — LENALIDOMIDE 5 MG/1
1 CAPSULE ORAL DAILY
Qty: 14 CAPSULE | Refills: 0 | Status: SHIPPED | OUTPATIENT
Start: 2023-07-11

## 2023-07-20 RX ORDER — ROPINIROLE 1 MG/1
1 TABLET, FILM COATED ORAL NIGHTLY
Qty: 90 TABLET | Refills: 0 | Status: SHIPPED | OUTPATIENT
Start: 2023-07-20

## 2023-08-02 ENCOUNTER — OFFICE VISIT (OUTPATIENT)
Dept: HEMATOLOGY/ONCOLOGY | Facility: HOSPITAL | Age: 83
End: 2023-08-02
Attending: INTERNAL MEDICINE
Payer: MEDICAID

## 2023-08-02 VITALS
OXYGEN SATURATION: 97 % | BODY MASS INDEX: 25 KG/M2 | DIASTOLIC BLOOD PRESSURE: 57 MMHG | WEIGHT: 167.19 LBS | TEMPERATURE: 98 F | SYSTOLIC BLOOD PRESSURE: 118 MMHG | HEART RATE: 77 BPM | RESPIRATION RATE: 20 BRPM

## 2023-08-02 DIAGNOSIS — C90.00 MULTIPLE MYELOMA NOT HAVING ACHIEVED REMISSION (HCC): Primary | ICD-10-CM

## 2023-08-02 LAB
ALBUMIN SERPL-MCNC: 3.8 G/DL (ref 3.4–5)
ALBUMIN/GLOB SERPL: 1 {RATIO} (ref 1–2)
ALP LIVER SERPL-CCNC: 124 U/L
ALT SERPL-CCNC: 23 U/L
ANION GAP SERPL CALC-SCNC: 9 MMOL/L (ref 0–18)
AST SERPL-CCNC: 12 U/L (ref 15–37)
BASOPHILS # BLD AUTO: 0.02 X10(3) UL (ref 0–0.2)
BASOPHILS NFR BLD AUTO: 0.6 %
BILIRUB SERPL-MCNC: 0.6 MG/DL (ref 0.1–2)
BUN BLD-MCNC: 50 MG/DL (ref 7–18)
CALCIUM BLD-MCNC: 8.5 MG/DL (ref 8.5–10.1)
CHLORIDE SERPL-SCNC: 103 MMOL/L (ref 98–112)
CO2 SERPL-SCNC: 26 MMOL/L (ref 21–32)
CREAT BLD-MCNC: 6.34 MG/DL
EGFRCR SERPLBLD CKD-EPI 2021: 8 ML/MIN/1.73M2 (ref 60–?)
EOSINOPHIL # BLD AUTO: 0.1 X10(3) UL (ref 0–0.7)
EOSINOPHIL NFR BLD AUTO: 2.8 %
ERYTHROCYTE [DISTWIDTH] IN BLOOD BY AUTOMATED COUNT: 15.9 %
GLOBULIN PLAS-MCNC: 3.8 G/DL (ref 2.8–4.4)
GLUCOSE BLD-MCNC: 152 MG/DL (ref 70–99)
HCT VFR BLD AUTO: 29.2 %
HGB BLD-MCNC: 9.9 G/DL
IMM GRANULOCYTES # BLD AUTO: 0.01 X10(3) UL (ref 0–1)
IMM GRANULOCYTES NFR BLD: 0.3 %
LYMPHOCYTES # BLD AUTO: 1.94 X10(3) UL (ref 1–4)
LYMPHOCYTES NFR BLD AUTO: 54.2 %
MCH RBC QN AUTO: 33.7 PG (ref 26–34)
MCHC RBC AUTO-ENTMCNC: 33.9 G/DL (ref 31–37)
MCV RBC AUTO: 99.3 FL
MONOCYTES # BLD AUTO: 0.57 X10(3) UL (ref 0.1–1)
MONOCYTES NFR BLD AUTO: 15.9 %
NEUTROPHILS # BLD AUTO: 0.94 X10 (3) UL (ref 1.5–7.7)
NEUTROPHILS # BLD AUTO: 0.94 X10(3) UL (ref 1.5–7.7)
NEUTROPHILS NFR BLD AUTO: 26.2 %
OSMOLALITY SERPL CALC.SUM OF ELEC: 302 MOSM/KG (ref 275–295)
PLATELET # BLD AUTO: 97 10(3)UL (ref 150–450)
POTASSIUM SERPL-SCNC: 4.7 MMOL/L (ref 3.5–5.1)
PROT SERPL-MCNC: 7.6 G/DL (ref 6.4–8.2)
RBC # BLD AUTO: 2.94 X10(6)UL
SODIUM SERPL-SCNC: 138 MMOL/L (ref 136–145)
WBC # BLD AUTO: 3.6 X10(3) UL (ref 4–11)

## 2023-08-02 PROCEDURE — 99214 OFFICE O/P EST MOD 30 MIN: CPT | Performed by: INTERNAL MEDICINE

## 2023-08-02 NOTE — PROGRESS NOTES
Outpatient Oncology Care Plan   Problem list:   fatigue   Problems related to:   side effect of treatment   Interventions:   provided general teaching   Expected outcomes:   understands plan of care   Progress towards outcome: making progress     Education Record   Learner: Patient and Family Member   Barriers / Limitations: None   Method: Discussion   Outcome: Shows understanding   Comments:  Patient here for follow-up. Remains on Revlimid 5 mg. On current off week. Still experiencing fatigue and neuropathy to bilateral feet. Feeling more restless and having a hard time sleeping at night.

## 2023-08-04 LAB
ALBUMIN SERPL ELPH-MCNC: 4.31 G/DL (ref 3.75–5.21)
ALBUMIN/GLOB SERPL: 1.6 {RATIO} (ref 1–2)
ALPHA1 GLOB SERPL ELPH-MCNC: 0.25 G/DL (ref 0.19–0.46)
ALPHA2 GLOB SERPL ELPH-MCNC: 0.65 G/DL (ref 0.48–1.05)
B-GLOBULIN SERPL ELPH-MCNC: 0.69 G/DL (ref 0.68–1.23)
GAMMA GLOB SERPL ELPH-MCNC: 1.1 G/DL (ref 0.62–1.7)
KAPPA LC FREE SER-MCNC: 40.99 MG/DL (ref 0.33–1.94)
KAPPA LC FREE/LAMBDA FREE SER NEPH: 1.79 {RATIO} (ref 0.26–1.65)
LAMBDA LC FREE SERPL-MCNC: 22.84 MG/DL (ref 0.57–2.63)
PROT SERPL-MCNC: 7 G/DL (ref 6.4–8.2)

## 2023-08-08 RX ORDER — LENALIDOMIDE 5 MG/1
1 CAPSULE ORAL DAILY
Qty: 14 CAPSULE | Refills: 0 | Status: SHIPPED | OUTPATIENT
Start: 2023-08-08

## 2023-08-14 ENCOUNTER — OFFICE VISIT (OUTPATIENT)
Dept: NEUROLOGY | Facility: CLINIC | Age: 83
End: 2023-08-14
Payer: MEDICAID

## 2023-08-14 VITALS
HEART RATE: 64 BPM | RESPIRATION RATE: 16 BRPM | DIASTOLIC BLOOD PRESSURE: 70 MMHG | SYSTOLIC BLOOD PRESSURE: 128 MMHG | BODY MASS INDEX: 25 KG/M2 | WEIGHT: 168 LBS

## 2023-08-14 DIAGNOSIS — M54.16 RIGHT LUMBAR RADICULOPATHY: ICD-10-CM

## 2023-08-14 DIAGNOSIS — F01.B0 MODERATE VASCULAR DEMENTIA, UNSPECIFIED WHETHER BEHAVIORAL, PSYCHOTIC, OR MOOD DISTURBANCE OR ANXIETY (HCC): Primary | ICD-10-CM

## 2023-08-14 PROCEDURE — 3074F SYST BP LT 130 MM HG: CPT | Performed by: OTHER

## 2023-08-14 PROCEDURE — 3078F DIAST BP <80 MM HG: CPT | Performed by: OTHER

## 2023-08-14 PROCEDURE — 99213 OFFICE O/P EST LOW 20 MIN: CPT | Performed by: OTHER

## 2023-08-14 RX ORDER — PREGABALIN 25 MG/1
25 CAPSULE ORAL 3 TIMES DAILY
Qty: 90 CAPSULE | Refills: 2 | Status: SHIPPED | OUTPATIENT
Start: 2023-08-14

## 2023-08-14 NOTE — PROGRESS NOTES
HPI:    Patient ID: Geri Matthews is a 81year old male. PCP: Dr Justyn Starks    HPI   Patient is here for follow up for memory problem. He is not taking Donepezil due to side effects- took once and had an episode of confusion. States memory problem is stable, continues to have sleep issues, usually sleeps 4 hours as he wakes up for Prayer every early morning. RLS relatively stable   C/o pain in the right LE lateral lower thigh and leg area. No tingling and numbness in feet. Geri Matthews is a 81year old male with history of diabetes, hypertension, ESRD who presents for evaluation of memory loss. He is here accompanied by his daughter who reports started having memory problem more noticeable after his wife's death in 3343 from Covid complications. He often repeats conversation and forgets conversation easily. He does sleep talking, though sleep fine over night. No sundowning or any hallucinations reported. He lives with family and independent with activity of daily living. 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 No history of known strokes. HISTORY:  Past Medical History:   Diagnosis Date    Chronic kidney disease     stage 5    Diabetes (HCC)     ESRD (end stage renal disease) ([facility] [address]     Essential hypertension     Glaucoma     High blood pressure     High cholesterol     History of blood transfusion     Multiple myeloma (HCC)     Myeloma ([facility] [address]     Peripheral vascular disease ([facility] [address]       Past Surgical History:   Procedure Laterality Date    APPENDECTOMY      AV FISTULA REVISION, OPEN      OTHER      dialysis      Family History   Problem Relation Age of Onset    Hypertension Father     Diabetes Father     Stroke Mother     Heart Disorder Mother       Social History     Socioeconomic History    Marital status:     Tobacco Use    Smoking status: Never    Smokeless tobacco: Never   Vaping Use    Vaping Use: Never used   Substance and Sexual Activity    Alcohol use: Never    Drug use: Never   Other Topics Concern    Caffeine Concern No    Exercise No    Seat Belt Yes        Review of Systems   Constitutional: Negative. HENT: Negative. Eyes: Negative. Respiratory: Negative. Cardiovascular: Negative. Gastrointestinal: Negative. Endocrine: Negative. Genitourinary: Negative. Musculoskeletal: Negative. Skin: Negative. Allergic/Immunologic: Negative. Neurological:  Positive for numbness. + memory loss   Psychiatric/Behavioral: Negative. All other systems reviewed and are negative. Current Outpatient Medications   Medication Sig Dispense Refill    Lenalidomide (REVLIMID) 5 MG Oral Cap Take 1 capsule by mouth daily. Take 1 capsule PO daily for 14 days of a 28 day cycle. 14 capsule 0    rOPINIRole 1 MG Oral Tab Take 1 tablet (1 mg total) by mouth nightly. 90 tablet 0    escitalopram 20 MG Oral Tab 20 mg. 1 tablet 90 tablet 1    folic acid 1 MG Oral Tab Take 1 tablet (1 mg total) by mouth daily. 90 tablet 0    losartan 100 MG Oral Tab Take 1 tablet (100 mg total) by mouth daily. 90 tablet 1    amLODIPine 10 MG Oral Tab Take 1 tablet (10 mg total) by mouth daily. 90 tablet 1    rosuvastatin 10 MG Oral Tab Take 1 tablet (10 mg total) by mouth nightly. 90 tablet 1    traZODone 50 MG Oral Tab Take 1 tablet (50 mg total) by mouth nightly. 90 tablet 1    gabapentin 300 MG Oral Cap Take 1 capsule (300 mg total) by mouth 3 (three) times daily. 270 capsule 0    calcium acetate 667 MG Oral Cap Take 2 capsules (1,334 mg total) by mouth 3 (three) times daily. 180 capsule 0    HYDROcodone-acetaminophen (NORCO)  MG Oral Tab Take 1 tablet by mouth every 8 (eight) hours as needed for Pain. 30 tablet 0    cyclobenzaprine 5 MG Oral Tab Take 1 tablet (5 mg total) by mouth nightly.  20 tablet 0    LATANOPROST OP Apply to eye.      hydrALAZINE 50 MG Oral Tab 2 tablets (100 mg total) 3 (three) times daily. cinacalcet 30 MG Oral Tab Take 1 tablet (30 mg total) by mouth 3 (three) times a week. T, Th, Sat      albuterol 108 (90 Base) MCG/ACT Inhalation Aero Soln Inhale 2 puffs into the lungs every 6 (six) hours as needed for Wheezing. 1 each 0    Insulin Pen Needle (TRUEPLUS 5-BEVEL PEN NEEDLES) 32G X 4 MM Does not apply Misc Test 2 times daily. 200 each 3    Continuous Blood Gluc Sensor (DEXCOM G6 SENSOR) Does not apply Misc 1 each Every 10 days. 9 each 3    Insulin Lispro Prot & Lispro (HUMALOG MIX 75/25 KWIKPEN) (75-25) 100 UNIT/ML SC SUPN Inject 20 Units into the skin 2 (two) times a day. 5 each 1    Insulin Pen Needle (BD PEN NEEDLE BETH U/F) 32G X 4 MM Does not apply Misc 1 each by Other route 2 (two) times a day. 200 each 3    Continuous Blood Gluc Transmit (DEXCOM G6 TRANSMITTER) Does not apply Misc Use one for 90 days. 1 each 3    OneTouch Delica Lancets 25Y Does not apply Misc Test once daily 100 each 3    Glucose Blood (ONETOUCH VERIO) In Vitro Strip 1 each by Other route daily. 100 strip 1    Blood Glucose Monitoring Suppl (ONETOUCH VERIO) w/Device Does not apply Kit 1 kit by Does not apply route as needed. 1 kit 0    Continuous Blood Gluc  (DEXCOM G6 ) Does not apply Device 1 strip by In Vitro route 5 (five) times daily. BABY ASPIRIN OR Take 81 mg by mouth daily. docusate sodium 100 MG Oral Cap 1 capsule (100 mg total) 2 (two) times daily. PRN      LORazepam 0.5 MG Oral Tab Take 1 tablet (0.5 mg total) by mouth nightly as needed for Anxiety. (Patient not taking: Reported on 8/14/2023) 10 tablet 0     Allergies:No Known Allergies  PHYSICAL EXAM:   Physical Exam  Blood pressure 128/70, pulse 64, resp. rate 16, weight 168 lb (76.2 kg). General Appearance: well nourished, well developed, no apparent distress. HEENT: Normocephalic and atraumatic. Cardiovascular: Normal rate, regular rhythm and normal heart sounds.     Pulmonary/Chest: Effort normal and breath sounds normal.   Abdominal: Soft. Bowel sounds are normal.   Skin: dry, clean and intact  Ext: peripheral pulses present  Psych: normal mood and affect    Neurological:  Patient is awake, alert and oriented to person, place and time   Normal speech and language. MOCA- 21/30 ( lost 1 point in executive function, 2 visuo-spatial skills  1 calculations, Verbal fluency, 4 in recall)    Cranial Nerves: II: Visual acuity: normal  II: Visual fields: normal  III: Pupils: equal, round, reactive to light  III,IV,VI: Extra Ocular Movements: intact  V: Facial sensation: intact  VII: Facial strength: intact  VIII: Hearing: intact  IX: Palate: intact  XI: Shoulder shrug: intact  XII: Tongue movement: normal    Motor: Normal tone. Strength is  5 out of 5 in all extremities bilaterally. Sensory: Sensory examination is normal to light touch and pinprick     Coordination: Finger-to-nose test intact    Gait: normal casual gait          TESTS/IMAGING:       CT head  No evidence of intracranial hemorrhage or extra-axial fluid collection. Lucencies in the deep periventricular white matter are likely sequelae of chronic small vessel ischemic disease. Prominence of the sulci is noted. Focal volume loss in the left basal ganglia is noted. No mass effect. Visualized portions of paranasal sinuses are unremarkable. Visualized portions of the mastoid air cells are unremarkable. Visualized portions of the orbits are unremarkable. IMPRESSION:   Sequelae of chronic small vessel ischemic disease is noted. No evidence of intracranial hemorrhage or extra-axial fluid collection.      ASSESSMENT/PLAN:     (F01.B0) Moderate vascular dementia, unspecified whether behavioral, psychotic, or mood disturbance or anxiety (HCC)  (primary encounter diagnosis)      (M54.16) Right lumbar radiculopathy          Patient is a 81year old female presenting with progressive memory loss likely vascular dementia    MRI brain shows mild microvascular changes and 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 Memory issues probably combination of SVID and sleep disturbance/chronic insomnia  Advise good sleep hygiene    Wean off Gabapentin as instructed. Take pregabalin 25 mg TID  Side effect explained    Follow up in about 6 months    See orders and medications filed with this encounter. The patient indicates understanding of these issues and agrees with the plan.       Moise Arteaga MD  [facility] [place] [address]      Meds This Visit:  Requested Prescriptions      No prescriptions requested or ordered in this encounter       Imaging & Referrals:  None     DONTE#[id number]

## 2023-08-14 NOTE — PROGRESS NOTES
Patient family states no changes in memory since last office visit. Patient discontinue donepezil due to side effects. Patient states confusion with donepezil.

## 2023-09-05 RX ORDER — LENALIDOMIDE 5 MG/1
1 CAPSULE ORAL DAILY
Qty: 14 CAPSULE | Refills: 0 | Status: SHIPPED | OUTPATIENT
Start: 2023-09-05

## 2023-09-25 DIAGNOSIS — G47.00 INSOMNIA, UNSPECIFIED TYPE: ICD-10-CM

## 2023-09-25 RX ORDER — TRAZODONE HYDROCHLORIDE 50 MG/1
50 TABLET ORAL NIGHTLY
Qty: 90 TABLET | Refills: 1 | OUTPATIENT
Start: 2023-09-25

## 2023-09-25 RX ORDER — ESCITALOPRAM OXALATE 20 MG/1
TABLET ORAL
Qty: 90 TABLET | Refills: 1 | OUTPATIENT
Start: 2023-09-25

## 2023-09-25 RX ORDER — FOLIC ACID 1 MG/1
1 TABLET ORAL DAILY
Qty: 90 TABLET | Refills: 0 | Status: SHIPPED | OUTPATIENT
Start: 2023-09-25

## 2023-09-25 NOTE — TELEPHONE ENCOUNTER
folic acid 1 MG Oral Tab         Sig: Take 1 tablet (1 mg total) by mouth daily.     Disp: 90 tablet    Refills: 0    Start: 9/25/2023       LOV  2/27/23     LAST LAB n/a     LAST RX  6/26/23 90     Next OV   Future Appointments   Date Time Provider James Camila   9/27/2023 12:45 PM Bradly Salas MD [address]   10/25/2023 11:00 AM Jenise Kinsey MD [facility] [place]         PROTOCOL none

## 2023-09-27 ENCOUNTER — OFFICE VISIT (OUTPATIENT)
Dept: HEMATOLOGY/ONCOLOGY | Facility: HOSPITAL | Age: 83
End: 2023-09-27
Attending: INTERNAL MEDICINE
Payer: MEDICAID

## 2023-09-27 VITALS
HEART RATE: 74 BPM | TEMPERATURE: 98 F | OXYGEN SATURATION: 99 % | WEIGHT: 162 LBS | BODY MASS INDEX: 24 KG/M2 | DIASTOLIC BLOOD PRESSURE: 53 MMHG | SYSTOLIC BLOOD PRESSURE: 94 MMHG | RESPIRATION RATE: 18 BRPM

## 2023-09-27 DIAGNOSIS — R63.4 WEIGHT LOSS: ICD-10-CM

## 2023-09-27 DIAGNOSIS — C90.00 MULTIPLE MYELOMA NOT HAVING ACHIEVED REMISSION (HCC): Primary | ICD-10-CM

## 2023-09-27 LAB
ALBUMIN SERPL-MCNC: 3.9 G/DL (ref 3.4–5)
ALBUMIN/GLOB SERPL: 1 {RATIO} (ref 1–2)
ALP LIVER SERPL-CCNC: 108 U/L
ALT SERPL-CCNC: 27 U/L
ANION GAP SERPL CALC-SCNC: 8 MMOL/L (ref 0–18)
AST SERPL-CCNC: 14 U/L (ref 15–37)
BASOPHILS # BLD AUTO: 0.01 X10(3) UL (ref 0–0.2)
BASOPHILS NFR BLD AUTO: 0.3 %
BILIRUB SERPL-MCNC: 0.4 MG/DL (ref 0.1–2)
BUN BLD-MCNC: 67 MG/DL (ref 7–18)
CALCIUM BLD-MCNC: 9.1 MG/DL (ref 8.5–10.1)
CHLORIDE SERPL-SCNC: 97 MMOL/L (ref 98–112)
CO2 SERPL-SCNC: 32 MMOL/L (ref 21–32)
CREAT BLD-MCNC: 6.84 MG/DL
EGFRCR SERPLBLD CKD-EPI 2021: 8 ML/MIN/1.73M2 (ref 60–?)
EOSINOPHIL # BLD AUTO: 0.04 X10(3) UL (ref 0–0.7)
EOSINOPHIL NFR BLD AUTO: 1.3 %
ERYTHROCYTE [DISTWIDTH] IN BLOOD BY AUTOMATED COUNT: 13.8 %
FASTING STATUS PATIENT QL REPORTED: NO
GLOBULIN PLAS-MCNC: 3.9 G/DL (ref 2.8–4.4)
GLUCOSE BLD-MCNC: 103 MG/DL (ref 70–99)
HCT VFR BLD AUTO: 33.2 %
HGB BLD-MCNC: 11.4 G/DL
IMM GRANULOCYTES # BLD AUTO: 0 X10(3) UL (ref 0–1)
IMM GRANULOCYTES NFR BLD: 0 %
LYMPHOCYTES # BLD AUTO: 1.32 X10(3) UL (ref 1–4)
LYMPHOCYTES NFR BLD AUTO: 43.6 %
MCH RBC QN AUTO: 34.9 PG (ref 26–34)
MCHC RBC AUTO-ENTMCNC: 34.3 G/DL (ref 31–37)
MCV RBC AUTO: 101.5 FL
MONOCYTES # BLD AUTO: 0.43 X10(3) UL (ref 0.1–1)
MONOCYTES NFR BLD AUTO: 14.2 %
NEUTROPHILS # BLD AUTO: 1.23 X10 (3) UL (ref 1.5–7.7)
NEUTROPHILS # BLD AUTO: 1.23 X10(3) UL (ref 1.5–7.7)
NEUTROPHILS NFR BLD AUTO: 40.6 %
OSMOLALITY SERPL CALC.SUM OF ELEC: 304 MOSM/KG (ref 275–295)
PLATELET # BLD AUTO: 79 10(3)UL (ref 150–450)
POTASSIUM SERPL-SCNC: 4.5 MMOL/L (ref 3.5–5.1)
PROT SERPL-MCNC: 7.8 G/DL (ref 6.4–8.2)
RBC # BLD AUTO: 3.27 X10(6)UL
SODIUM SERPL-SCNC: 137 MMOL/L (ref 136–145)
WBC # BLD AUTO: 3 X10(3) UL (ref 4–11)

## 2023-09-27 PROCEDURE — 99214 OFFICE O/P EST MOD 30 MIN: CPT | Performed by: INTERNAL MEDICINE

## 2023-09-27 NOTE — PROGRESS NOTES
Outpatient Oncology Care Plan   Problem list:   fatigue   Problems related to:   side effect of treatment   Interventions:   provided general teaching   Expected outcomes:   understands plan of care   Progress towards outcome: making progress     Education Record   Learner: Patient   Barriers / Limitations: None   Method: Discussion   Outcome: Shows understanding   Comments:  Patient here for follow-up. Remains on Revlimid 5 mg daily currently on off week. Will resume next week. On ASA 81 mg daily as well. Energy levels are still poor due to sleep issues.

## 2023-10-02 LAB
ALBUMIN SERPL ELPH-MCNC: 4.67 G/DL (ref 3.75–5.21)
ALBUMIN/GLOB SERPL: 1.54 {RATIO} (ref 1–2)
ALPHA1 GLOB SERPL ELPH-MCNC: 0.24 G/DL (ref 0.19–0.46)
ALPHA2 GLOB SERPL ELPH-MCNC: 0.7 G/DL (ref 0.48–1.05)
B-GLOBULIN SERPL ELPH-MCNC: 0.79 G/DL (ref 0.68–1.23)
GAMMA GLOB SERPL ELPH-MCNC: 1.29 G/DL (ref 0.62–1.7)
KAPPA LC FREE SER-MCNC: 37.96 MG/DL (ref 0.33–1.94)
KAPPA LC FREE/LAMBDA FREE SER NEPH: 1.53 {RATIO} (ref 0.26–1.65)
LAMBDA LC FREE SERPL-MCNC: 24.76 MG/DL (ref 0.57–2.63)
PROT SERPL-MCNC: 7.7 G/DL (ref 6.4–8.2)

## 2023-10-05 RX ORDER — LENALIDOMIDE 5 MG/1
CAPSULE ORAL
Qty: 14 CAPSULE | Refills: 0 | OUTPATIENT
Start: 2023-10-05

## 2023-10-05 RX ORDER — LENALIDOMIDE 5 MG/1
1 CAPSULE ORAL DAILY
Qty: 14 CAPSULE | Refills: 0 | Status: SHIPPED | OUTPATIENT
Start: 2023-10-05

## 2023-10-11 ENCOUNTER — HOSPITAL ENCOUNTER (OUTPATIENT)
Dept: CT IMAGING | Facility: HOSPITAL | Age: 83
Discharge: HOME | End: 2023-10-11
Attending: INTERNAL MEDICINE
Payer: MEDICAID

## 2023-10-11 DIAGNOSIS — R63.4 WEIGHT LOSS: ICD-10-CM

## 2023-10-11 DIAGNOSIS — C90.00 MULTIPLE MYELOMA NOT HAVING ACHIEVED REMISSION (HCC): ICD-10-CM

## 2023-10-11 PROCEDURE — 74176 CT ABD & PELVIS W/O CONTRAST: CPT | Performed by: INTERNAL MEDICINE

## 2023-10-11 PROCEDURE — 71250 CT THORAX DX C-: CPT | Performed by: INTERNAL MEDICINE

## 2023-10-17 ENCOUNTER — APPOINTMENT (OUTPATIENT)
Dept: CT IMAGING | Facility: HOSPITAL | Age: 83
End: 2023-10-17
Attending: EMERGENCY MEDICINE
Payer: MEDICAID

## 2023-10-17 ENCOUNTER — APPOINTMENT (OUTPATIENT)
Dept: GENERAL RADIOLOGY | Facility: HOSPITAL | Age: 83
End: 2023-10-17
Attending: EMERGENCY MEDICINE
Payer: MEDICAID

## 2023-10-17 ENCOUNTER — HOSPITAL ENCOUNTER (EMERGENCY)
Facility: HOSPITAL | Age: 83
Discharge: HOME | End: 2023-10-17
Attending: EMERGENCY MEDICINE
Payer: MEDICAID

## 2023-10-17 VITALS
TEMPERATURE: 98 F | SYSTOLIC BLOOD PRESSURE: 148 MMHG | DIASTOLIC BLOOD PRESSURE: 59 MMHG | OXYGEN SATURATION: 96 % | HEART RATE: 64 BPM | RESPIRATION RATE: 24 BRPM

## 2023-10-17 DIAGNOSIS — R53.1 WEAKNESS GENERALIZED: Primary | ICD-10-CM

## 2023-10-17 DIAGNOSIS — N18.6 STAGE 5 CHRONIC KIDNEY DISEASE ON CHRONIC DIALYSIS (HCC): ICD-10-CM

## 2023-10-17 DIAGNOSIS — D64.9 ANEMIA, UNSPECIFIED TYPE: ICD-10-CM

## 2023-10-17 DIAGNOSIS — Z99.2 STAGE 5 CHRONIC KIDNEY DISEASE ON CHRONIC DIALYSIS (HCC): ICD-10-CM

## 2023-10-17 LAB
ALBUMIN SERPL-MCNC: 3.5 G/DL (ref 3.4–5)
ALBUMIN/GLOB SERPL: 0.9 {RATIO} (ref 1–2)
ALP LIVER SERPL-CCNC: 101 U/L
ALT SERPL-CCNC: 29 U/L
ANION GAP SERPL CALC-SCNC: 5 MMOL/L (ref 0–18)
AST SERPL-CCNC: 22 U/L (ref 15–37)
BASOPHILS # BLD AUTO: 0.02 X10(3) UL (ref 0–0.2)
BASOPHILS NFR BLD AUTO: 0.4 %
BILIRUB SERPL-MCNC: 0.4 MG/DL (ref 0.1–2)
BUN BLD-MCNC: 17 MG/DL (ref 7–18)
CALCIUM BLD-MCNC: 8.9 MG/DL (ref 8.5–10.1)
CHLORIDE SERPL-SCNC: 99 MMOL/L (ref 98–112)
CO2 SERPL-SCNC: 34 MMOL/L (ref 21–32)
CREAT BLD-MCNC: 3.54 MG/DL
EGFRCR SERPLBLD CKD-EPI 2021: 16 ML/MIN/1.73M2 (ref 60–?)
EOSINOPHIL # BLD AUTO: 0.16 X10(3) UL (ref 0–0.7)
EOSINOPHIL NFR BLD AUTO: 2.8 %
ERYTHROCYTE [DISTWIDTH] IN BLOOD BY AUTOMATED COUNT: 13.5 %
GLOBULIN PLAS-MCNC: 4 G/DL (ref 2.8–4.4)
GLUCOSE BLD-MCNC: 156 MG/DL (ref 70–99)
HCT VFR BLD AUTO: 23.7 %
HGB BLD-MCNC: 8.6 G/DL
IMM GRANULOCYTES # BLD AUTO: 0.04 X10(3) UL (ref 0–1)
IMM GRANULOCYTES NFR BLD: 0.7 %
LYMPHOCYTES # BLD AUTO: 1.49 X10(3) UL (ref 1–4)
LYMPHOCYTES NFR BLD AUTO: 26.2 %
MCH RBC QN AUTO: 34.8 PG (ref 26–34)
MCHC RBC AUTO-ENTMCNC: 36.3 G/DL (ref 31–37)
MCV RBC AUTO: 96 FL
MONOCYTES # BLD AUTO: 0.52 X10(3) UL (ref 0.1–1)
MONOCYTES NFR BLD AUTO: 9.1 %
NEUTROPHILS # BLD AUTO: 3.46 X10 (3) UL (ref 1.5–7.7)
NEUTROPHILS # BLD AUTO: 3.46 X10(3) UL (ref 1.5–7.7)
NEUTROPHILS NFR BLD AUTO: 60.8 %
OSMOLALITY SERPL CALC.SUM OF ELEC: 291 MOSM/KG (ref 275–295)
PLATELET # BLD AUTO: 75 10(3)UL (ref 150–450)
POTASSIUM SERPL-SCNC: 3.6 MMOL/L (ref 3.5–5.1)
PROT SERPL-MCNC: 7.5 G/DL (ref 6.4–8.2)
RBC # BLD AUTO: 2.47 X10(6)UL
SARS-COV-2 RNA RESP QL NAA+PROBE: NOT DETECTED
SODIUM SERPL-SCNC: 138 MMOL/L (ref 136–145)
TROPONIN I HIGH SENSITIVITY: 32 NG/L
WBC # BLD AUTO: 5.7 X10(3) UL (ref 4–11)

## 2023-10-17 PROCEDURE — 80053 COMPREHEN METABOLIC PANEL: CPT | Performed by: EMERGENCY MEDICINE

## 2023-10-17 PROCEDURE — 70450 CT HEAD/BRAIN W/O DYE: CPT | Performed by: EMERGENCY MEDICINE

## 2023-10-17 PROCEDURE — 36415 COLL VENOUS BLD VENIPUNCTURE: CPT

## 2023-10-17 PROCEDURE — 71046 X-RAY EXAM CHEST 2 VIEWS: CPT | Performed by: EMERGENCY MEDICINE

## 2023-10-17 PROCEDURE — 99285 EMERGENCY DEPT VISIT HI MDM: CPT

## 2023-10-17 PROCEDURE — 73590 X-RAY EXAM OF LOWER LEG: CPT | Performed by: EMERGENCY MEDICINE

## 2023-10-17 PROCEDURE — 72100 X-RAY EXAM L-S SPINE 2/3 VWS: CPT | Performed by: EMERGENCY MEDICINE

## 2023-10-17 PROCEDURE — 93010 ELECTROCARDIOGRAM REPORT: CPT

## 2023-10-17 PROCEDURE — 85025 COMPLETE CBC W/AUTO DIFF WBC: CPT | Performed by: EMERGENCY MEDICINE

## 2023-10-17 PROCEDURE — 84484 ASSAY OF TROPONIN QUANT: CPT | Performed by: EMERGENCY MEDICINE

## 2023-10-17 PROCEDURE — 72125 CT NECK SPINE W/O DYE: CPT | Performed by: EMERGENCY MEDICINE

## 2023-10-17 PROCEDURE — 93005 ELECTROCARDIOGRAM TRACING: CPT

## 2023-10-17 NOTE — ED QUICK NOTES
Patient's daughter to bedside. Daughter reports she believes he has had at least one unwitnessed fall.

## 2023-10-17 NOTE — ED INITIAL ASSESSMENT (HPI)
Patient to the ER from home. Patient reports he has been dizzy and weak for past 5 days. Received dialysis today. Zachary Minors S. Fistula to right arm. Hx of dm and multiple myoma. Patient is on recent meds for a flare up.  No fever nausea and vomiting today no diarrhea glucose 166 in route [place] 0

## 2023-10-18 ENCOUNTER — TELEPHONE (OUTPATIENT)
Facility: CLINIC | Age: 83
End: 2023-10-18

## 2023-10-18 LAB
ATRIAL RATE: 69 BPM
P AXIS: 14 DEGREES
P-R INTERVAL: 184 MS
Q-T INTERVAL: 470 MS
QRS DURATION: 150 MS
QTC CALCULATION (BEZET): 503 MS
R AXIS: -19 DEGREES
T AXIS: 148 DEGREES
VENTRICULAR RATE: 69 BPM

## 2023-10-18 NOTE — DISCHARGE INSTRUCTIONS
Follow-up for further evaluation with primary physician and your nephrologist.  Call for an appointment. Rest, plenty fluids. Return if syncope, chest pain, new or worse symptoms.

## 2023-10-18 NOTE — TELEPHONE ENCOUNTER
Received call into office from Patients daughter Graciela Cormier who states having an issue getting patients Dexcom G6 connected to [employer]. When share code is put in it shows \"sharing error\"    RN provided [facility] Financial support number [phone] for them to call and troubleshoot. Graciela Cormier verbalized understanding and will call Technical support. Received call back from patients daughter to re send code. Code resent    Email address: [email or web address]    Still reading as code error. RN to consult DM educator Ricky Taylor and Jaswinder Donnelly to see what might be going on. Daughter Graciela Cormier gave the ok to give information to Webb Thomasmouth from [place] . Number to call her is  [phone]. Discussed with DM educator Ricky Taylor that this may be an ongoing problem. Routed to García Standing for review.

## 2023-10-18 NOTE — TELEPHONE ENCOUNTER
Called pharmacy and confirmed refill too soon       Medication: pregabalin 25 MG Oral Cap     Date of last refill: 08/14/23 (90/2)  [place] 42[place]19/09    Last office visit: 08/14/23  Due back to clinic per last office note:  6 months  Date next office visit scheduled:    Future Appointments   Date Time Provider James Camila   10/25/2023 11:00 AM Sienna Johnston MD [facility] [place]   10/25/2023 12:15 PM Lynette Robertson MD [facility] [address]        Last OV note recommendation:     PLAN:      (F01.B0) Moderate vascular dementia, unspecified whether behavioral, psychotic, or mood disturbance or anxiety ([facility] [address]  (primary encounter diagnosis)        (M54.16) Right lumbar radiculopathy              Patient is a 81year old female presenting with progressive memory loss likely vascular dementia     MRI brain shows mild microvascular changes and 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  Take pregabalin 25 mg TID  Side effect explained     Follow up in about 6 months

## 2023-10-18 NOTE — ED QUICK NOTES
Pt road tested by this pct with assistance of walker. Pt ambulated with steady gait but complained of dizziness when ambulating.

## 2023-10-18 NOTE — ED QUICK NOTES
Rounding Completed    Plan of Care reviewed. Waiting for MD re-assessment. Elimination needs assessed. Provided w/ warm blanket. Daughter at bedside    Bed is locked and in lowest position. Call light within reach.

## 2023-10-19 ENCOUNTER — TELEPHONE (OUTPATIENT)
Facility: CLINIC | Age: 83
End: 2023-10-19

## 2023-10-19 RX ORDER — PREGABALIN 25 MG/1
25 CAPSULE ORAL 3 TIMES DAILY
Qty: 90 CAPSULE | Refills: 2 | OUTPATIENT
Start: 2023-10-19

## 2023-10-25 ENCOUNTER — TELEPHONE (OUTPATIENT)
Facility: CLINIC | Age: 83
End: 2023-10-25

## 2023-10-25 ENCOUNTER — HOSPITAL ENCOUNTER (OUTPATIENT)
Dept: GENERAL RADIOLOGY | Age: 83
Discharge: HOME | End: 2023-10-25
Attending: FAMILY MEDICINE

## 2023-10-25 ENCOUNTER — OFFICE VISIT (OUTPATIENT)
Facility: CLINIC | Age: 83
End: 2023-10-25

## 2023-10-25 ENCOUNTER — LAB ENCOUNTER (OUTPATIENT)
Dept: LAB | Age: 83
End: 2023-10-25
Attending: STUDENT IN AN ORGANIZED HEALTH CARE EDUCATION/TRAINING PROGRAM

## 2023-10-25 ENCOUNTER — PATIENT MESSAGE (OUTPATIENT)
Facility: CLINIC | Age: 83
End: 2023-10-25

## 2023-10-25 ENCOUNTER — OFFICE VISIT (OUTPATIENT)
Dept: FAMILY MEDICINE CLINIC | Facility: CLINIC | Age: 83
End: 2023-10-25

## 2023-10-25 VITALS
SYSTOLIC BLOOD PRESSURE: 104 MMHG | WEIGHT: 163 LBS | DIASTOLIC BLOOD PRESSURE: 42 MMHG | HEIGHT: 69 IN | HEART RATE: 70 BPM | RESPIRATION RATE: 16 BRPM | BODY MASS INDEX: 24.14 KG/M2 | TEMPERATURE: 97 F | OXYGEN SATURATION: 100 %

## 2023-10-25 VITALS — HEART RATE: 65 BPM | SYSTOLIC BLOOD PRESSURE: 138 MMHG | DIASTOLIC BLOOD PRESSURE: 88 MMHG | OXYGEN SATURATION: 100 %

## 2023-10-25 DIAGNOSIS — N18.6 TYPE 2 DIABETES MELLITUS WITH CHRONIC KIDNEY DISEASE ON CHRONIC DIALYSIS, WITH LONG-TERM CURRENT USE OF INSULIN (HCC): Primary | ICD-10-CM

## 2023-10-25 DIAGNOSIS — Z79.4 TYPE 2 DIABETES MELLITUS WITH CHRONIC KIDNEY DISEASE ON CHRONIC DIALYSIS, WITH LONG-TERM CURRENT USE OF INSULIN (HCC): ICD-10-CM

## 2023-10-25 DIAGNOSIS — Z79.4 TYPE 2 DIABETES MELLITUS WITH CHRONIC KIDNEY DISEASE ON CHRONIC DIALYSIS, WITH LONG-TERM CURRENT USE OF INSULIN (HCC): Primary | ICD-10-CM

## 2023-10-25 DIAGNOSIS — Z99.2 TYPE 2 DIABETES MELLITUS WITH CHRONIC KIDNEY DISEASE ON CHRONIC DIALYSIS, WITH LONG-TERM CURRENT USE OF INSULIN (HCC): Primary | ICD-10-CM
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
## 2023-10-25 DIAGNOSIS — C90.00 MULTIPLE MYELOMA, REMISSION STATUS UNSPECIFIED (HCC): ICD-10-CM

## 2023-10-25 DIAGNOSIS — E11.3313 TYPE 2 DIABETES MELLITUS WITH BOTH EYES AFFECTED BY MODERATE NONPROLIFERATIVE RETINOPATHY AND MACULAR EDEMA, WITH LONG-TERM CURRENT USE OF INSULIN (HCC): ICD-10-CM

## 2023-10-25 DIAGNOSIS — I15.2 HYPERTENSION ASSOCIATED WITH DIABETES: ICD-10-CM

## 2023-10-25 DIAGNOSIS — E11.42 TYPE 2 DIABETES MELLITUS WITH POLYNEUROPATHY (HCC): ICD-10-CM

## 2023-10-25 DIAGNOSIS — R53.1 GENERALIZED WEAKNESS: ICD-10-CM

## 2023-10-25 DIAGNOSIS — N18.6 TYPE 2 DIABETES MELLITUS WITH CHRONIC KIDNEY DISEASE ON CHRONIC DIALYSIS, WITH LONG-TERM CURRENT USE OF INSULIN (HCC): ICD-10-CM

## 2023-10-25 DIAGNOSIS — E11.22 TYPE 2 DIABETES MELLITUS WITH CHRONIC KIDNEY DISEASE ON CHRONIC DIALYSIS, WITH LONG-TERM CURRENT USE OF INSULIN (HCC): ICD-10-CM

## 2023-10-25 DIAGNOSIS — E11.59 HYPERTENSION ASSOCIATED WITH DIABETES: ICD-10-CM

## 2023-10-25 DIAGNOSIS — M54.50 ACUTE LEFT-SIDED LOW BACK PAIN WITHOUT SCIATICA: ICD-10-CM

## 2023-10-25 DIAGNOSIS — Z99.2 TYPE 2 DIABETES MELLITUS WITH CHRONIC KIDNEY DISEASE ON CHRONIC DIALYSIS, WITH LONG-TERM CURRENT USE OF INSULIN (HCC): ICD-10-CM

## 2023-10-25 DIAGNOSIS — R29.6 FREQUENT FALLS: ICD-10-CM

## 2023-10-25 DIAGNOSIS — Z00.00 ENCOUNTER FOR ANNUAL PHYSICAL EXAM: Primary | ICD-10-CM

## 2023-10-25 DIAGNOSIS — Z99.2 ESRD ON HEMODIALYSIS (HCC): ICD-10-CM

## 2023-10-25 DIAGNOSIS — I10 ESSENTIAL HYPERTENSION: ICD-10-CM

## 2023-10-25 DIAGNOSIS — E11.22 TYPE 2 DIABETES MELLITUS WITH CHRONIC KIDNEY DISEASE ON CHRONIC DIALYSIS, WITH LONG-TERM CURRENT USE OF INSULIN (HCC): Primary | ICD-10-CM

## 2023-10-25 DIAGNOSIS — N18.6 ESRD ON HEMODIALYSIS (HCC): ICD-10-CM

## 2023-10-25 DIAGNOSIS — Z79.4 TYPE 2 DIABETES MELLITUS WITH BOTH EYES AFFECTED BY MODERATE NONPROLIFERATIVE RETINOPATHY AND MACULAR EDEMA, WITH LONG-TERM CURRENT USE OF INSULIN (HCC): ICD-10-CM

## 2023-10-25 DIAGNOSIS — G25.81 RLS (RESTLESS LEGS SYNDROME): ICD-10-CM

## 2023-10-25 LAB
T4 FREE SERPL-MCNC: 0.8 NG/DL (ref 0.8–1.7)
TSI SER-ACNC: 2.34 MIU/ML (ref 0.36–3.74)

## 2023-10-25 PROCEDURE — 72110 X-RAY EXAM L-2 SPINE 4/>VWS: CPT | Performed by: FAMILY MEDICINE

## 2023-10-25 PROCEDURE — 3074F SYST BP LT 130 MM HG: CPT | Performed by: FAMILY MEDICINE

## 2023-10-25 PROCEDURE — 3079F DIAST BP 80-89 MM HG: CPT | Performed by: STUDENT IN AN ORGANIZED HEALTH CARE EDUCATION/TRAINING PROGRAM

## 2023-10-25 PROCEDURE — 84443 ASSAY THYROID STIM HORMONE: CPT

## 2023-10-25 PROCEDURE — 3078F DIAST BP <80 MM HG: CPT | Performed by: FAMILY MEDICINE

## 2023-10-25 PROCEDURE — 3008F BODY MASS INDEX DOCD: CPT | Performed by: FAMILY MEDICINE

## 2023-10-25 PROCEDURE — 36415 COLL VENOUS BLD VENIPUNCTURE: CPT

## 2023-10-25 PROCEDURE — 95251 CONT GLUC MNTR ANALYSIS I&R: CPT | Performed by: STUDENT IN AN ORGANIZED HEALTH CARE EDUCATION/TRAINING PROGRAM

## 2023-10-25 PROCEDURE — 99397 PER PM REEVAL EST PAT 65+ YR: CPT | Performed by: FAMILY MEDICINE

## 2023-10-25 PROCEDURE — 99205 OFFICE O/P NEW HI 60 MIN: CPT | Performed by: STUDENT IN AN ORGANIZED HEALTH CARE EDUCATION/TRAINING PROGRAM

## 2023-10-25 PROCEDURE — 3075F SYST BP GE 130 - 139MM HG: CPT | Performed by: STUDENT IN AN ORGANIZED HEALTH CARE EDUCATION/TRAINING PROGRAM

## 2023-10-25 PROCEDURE — 84439 ASSAY OF FREE THYROXINE: CPT

## 2023-10-25 PROCEDURE — 99214 OFFICE O/P EST MOD 30 MIN: CPT | Performed by: FAMILY MEDICINE

## 2023-10-25 RX ORDER — AMLODIPINE BESYLATE 10 MG/1
10 TABLET ORAL DAILY
Qty: 90 TABLET | Refills: 1 | Status: SHIPPED | OUTPATIENT
Start: 2023-10-25

## 2023-10-25 RX ORDER — BLOOD-GLUCOSE METER
1 EACH MISCELLANEOUS DAILY
Qty: 100 STRIP | Refills: 1 | Status: SHIPPED | OUTPATIENT
Start: 2023-10-25

## 2023-10-25 RX ORDER — ESCITALOPRAM OXALATE 20 MG/1
TABLET ORAL
Qty: 90 TABLET | Refills: 1 | Status: SHIPPED | OUTPATIENT
Start: 2023-10-25

## 2023-10-25 RX ORDER — ROSUVASTATIN CALCIUM 10 MG/1
10 TABLET, COATED ORAL NIGHTLY
Qty: 90 TABLET | Refills: 1 | Status: SHIPPED | OUTPATIENT
Start: 2023-10-25

## 2023-10-25 RX ORDER — ALBUTEROL SULFATE 90 UG/1
2 AEROSOL, METERED RESPIRATORY (INHALATION) EVERY 6 HOURS PRN
Qty: 1 EACH | Refills: 0 | Status: SHIPPED | OUTPATIENT
Start: 2023-10-25

## 2023-10-25 RX ORDER — INSULIN LISPRO 100 [IU]/ML
20 INJECTION, SUSPENSION SUBCUTANEOUS 2 TIMES DAILY
Qty: 5 EACH | Refills: 1 | Status: SHIPPED | OUTPATIENT
Start: 2023-10-25

## 2023-10-25 RX ORDER — PEN NEEDLE, DIABETIC 32GX 5/32"
1 NEEDLE, DISPOSABLE MISCELLANEOUS 2 TIMES DAILY
Qty: 200 EACH | Refills: 3 | Status: SHIPPED | OUTPATIENT
Start: 2023-10-25

## 2023-10-25 RX ORDER — ACYCLOVIR 400 MG/1
1 TABLET ORAL
Qty: 10 EACH | Refills: 1 | Status: SHIPPED | OUTPATIENT
Start: 2023-10-25

## 2023-10-25 RX ORDER — LOSARTAN POTASSIUM 100 MG/1
100 TABLET ORAL DAILY
Qty: 90 TABLET | Refills: 1 | Status: SHIPPED | OUTPATIENT
Start: 2023-10-25

## 2023-10-25 RX ORDER — PREDNISONE 20 MG/1
20 TABLET ORAL DAILY
Qty: 5 TABLET | Refills: 0 | Status: SHIPPED | OUTPATIENT
Start: 2023-10-25 | End: 2023-10-30

## 2023-10-25 RX ORDER — PROCHLORPERAZINE 25 MG/1
1 SUPPOSITORY RECTAL AS DIRECTED
Qty: 1 EACH | Refills: 0 | Status: SHIPPED | OUTPATIENT
Start: 2023-10-25

## 2023-10-25 RX ORDER — ROPINIROLE 1 MG/1
1 TABLET, FILM COATED ORAL NIGHTLY
Qty: 90 TABLET | Refills: 0 | Status: SHIPPED | OUTPATIENT
Start: 2023-10-25

## 2023-10-25 NOTE — TELEPHONE ENCOUNTER
10/25/23-Received via fax from [employer] E Jonny ruano PA for BD Pen Needle/Eugenia [id number]. Placed in PA in basket.

## 2023-10-25 NOTE — PROGRESS NOTES
[facility] Note    Name: Ydai Rios    Date: 10/25/2023    HISTORY OF PRESENT ILLNESS   Yadi Rios is a 81year old male with PMHx significant for insulin-dependent DM2 with retinopathy and neuropathy, ESRD on HD who presents for  DM2 mgmt. Initial HPI consult in Oct 2023:  Patient presents with:  Consult: Np here to establish care for DM2 w chronic kidney disease on chronic dialysis, w long term current use of insulin. Diabetes: Last HgA1c 5.8 on 10/19/2023  Previous Endo Hx with [facility]- LOV 6/12/2023  Previously with Nance [facility] [place], recent insurance changed  LV 6/2023    Last A1c value was 5.8% (from labs drawn at dialysis in 10/2023)    Diagnosed age: 21yrs  Pt denies hx of hospitalization for DM and/or pancreatitis: none  Family history of DM: both sides    DM meds at first office visit:  Humalog 75/25 BID 5-20U BID depending on mealtime; they mostly use no more than 10U per dose (if FBG <90, none; if FBG >100 and he plans to get a good meal, then 5U; if premeal 170-180, then 6U)  Occ uses fiasp for lunch (1-2U)  Generally doesn't give on HD days because he eats less and more tired    DM Meds: Insulin Lispro Prot & Lispro Supn - (75-25) 100 UNIT/ML     Previously trialed   Metformin (pre-HD)      Continuous Glucose Monitoring Interpretation    Yadi Rios has undergone continuous glucose monitoring with the Dexcom CGM. The blood glucose tracings were evaluated for two weeks prior to office visit. Blood glucose tracings demonstrated no areas of consistent hyperglycemia There were 1-2 areas of  hypoglycemia during the weeks of evaluation (10-11pm), no awareness, daughter monitors and pt responds to glucose tablets. At goal () 99% of the time. High <1% of the time. Very high <1% of the time. Low <1% of the time. Very low 0% of the time. Pt has both dizziness and insomnia. Has previously trialed trazodone and neurontin. Is on lyrica and melatonin.    Uses a walker more frequently. Has had many falls but no fx. Vit D and Ca at goal.  TSH at goal in 2022. REVIEW OF SYSTEMS  Ten point review of systems has been performed and is otherwise negative and/or non-contributory, except as described above. Medications:     Current Outpatient Medications:     Continuous Blood Gluc Sensor (DEXCOM G7 SENSOR) Does not apply Misc, 1 each Every 10 days. , Disp: 10 each, Rfl: 1    Glucose Blood (ONETOUCH VERIO) In Vitro Strip, 1 each by Other route daily. , Disp: 100 strip, Rfl: 1    Insulin Pen Needle (BD PEN NEEDLE BETH U/F) 32G X 4 MM Does not apply Misc, 1 each by Other route in the morning and 1 each before bedtime. , Disp: 200 each, Rfl: 3    Insulin Lispro Prot & Lispro (HUMALOG MIX 75/25 KWIKPEN) (75-25) 100 UNIT/ML SC SUPN, Inject 20 Units into the skin in the morning and 20 Units before bedtime. , Disp: 5 each, Rfl: 1    Lenalidomide (REVLIMID) 5 MG Oral Cap, Take 1 capsule by mouth daily. Take 1 capsule PO daily for 14 days of a 28 day cycle., Disp: 14 capsule, Rfl: 0    folic acid 1 MG Oral Tab, Take 1 tablet (1 mg total) by mouth daily. , Disp: 90 tablet, Rfl: 0    pregabalin 25 MG Oral Cap, Take 1 capsule (25 mg total) by mouth 3 (three) times daily. , Disp: 90 capsule, Rfl: 2    rOPINIRole 1 MG Oral Tab, Take 1 tablet (1 mg total) by mouth nightly., Disp: 90 tablet, Rfl: 0    escitalopram 20 MG Oral Tab, 20 mg. 1 tablet, Disp: 90 tablet, Rfl: 1    losartan 100 MG Oral Tab, Take 1 tablet (100 mg total) by mouth daily. , Disp: 90 tablet, Rfl: 1    amLODIPine 10 MG Oral Tab, Take 1 tablet (10 mg total) by mouth daily. , Disp: 90 tablet, Rfl: 1    rosuvastatin 10 MG Oral Tab, Take 1 tablet (10 mg total) by mouth nightly., Disp: 90 tablet, Rfl: 1    traZODone 50 MG Oral Tab, Take 1 tablet (50 mg total) by mouth nightly., Disp: 90 tablet, Rfl: 1    calcium acetate 667 MG Oral Cap, Take 2 capsules (1,334 mg total) by mouth 3 (three) times daily. , Disp: 180 capsule, Rfl: 0 HYDROcodone-acetaminophen (NORCO)  MG Oral Tab, Take 1 tablet by mouth every 8 (eight) hours as needed for Pain., Disp: 30 tablet, Rfl: 0    cyclobenzaprine 5 MG Oral Tab, Take 1 tablet (5 mg total) by mouth nightly., Disp: 20 tablet, Rfl: 0    LATANOPROST OP, Apply to eye., Disp: , Rfl:     hydrALAZINE 50 MG Oral Tab, 2 tablets (100 mg total) 3 (three) times daily. , Disp: , Rfl:     cinacalcet 30 MG Oral Tab, Take 1 tablet (30 mg total) by mouth 3 (three) times a week. T, Th, Sat, Disp: , Rfl:     LORazepam 0.5 MG Oral Tab, Take 1 tablet (0.5 mg total) by mouth nightly as needed for Anxiety. , Disp: 10 tablet, Rfl: 0    albuterol 108 (90 Base) MCG/ACT Inhalation Aero Soln, Inhale 2 puffs into the lungs every 6 (six) hours as needed for Wheezing., Disp: 1 each, Rfl: 0    Insulin Pen Needle (TRUEPLUS 5-BEVEL PEN NEEDLES) 32G X 4 MM Does not apply Misc, Test 2 times daily. , Disp: 200 each, Rfl: 3    OneTouch Delica Lancets 41A Does not apply Misc, Test once daily, Disp: 100 each, Rfl: 3    Blood Glucose Monitoring Suppl (Spero Rule) w/Device Does not apply Kit, 1 kit by Does not apply route as needed. , Disp: 1 kit, Rfl: 0    BABY ASPIRIN OR, Take 81 mg by mouth daily. , Disp: , Rfl:     docusate sodium 100 MG Oral Cap, 1 capsule (100 mg total) 2 (two) times daily. PRN, Disp: , Rfl:      Allergies:   No Known Allergies    Social History:   Social History    Socioeconomic History      Marital status:      Tobacco Use      Smoking status: Never      Smokeless tobacco: Never    Vaping Use      Vaping Use: Never used    Substance and Sexual Activity      Alcohol use: Never      Drug use: Never    Other Topics      Concerns:        Caffeine Concern: No        Exercise: No        Seat Belt: Yes      Medical History:   Reviewed    Surgical history:   Past Surgical History:   Procedure Laterality Date    APPENDECTOMY      AV FISTULA REVISION, OPEN      OTHER      dialysis         PHYSICAL EXAM   10/25/23  1041 BP: 138/88   Pulse: 65   SpO2: 100%       General Appearance:  alert, well developed, in no acute distress  Eyes:  normal conjunctivae, sclera  Ears/Nose/Mouth/Throat/Neck:  ?small L thyroid nodule  Cardiovascular:  regular rate  Respiratory:  breathing comfortably  Skin:  normal moisture and skin texture   Psychiatric:  oriented to time, self, and place  Neuro:  sensory grossly intact and motor grossly intact    Labs/Imaging: Reviewed. ASSESSMENT/PLAN:    (E11.22,  N18.6,  Z99.2,  Z79.4) Type 2 diabetes mellitus with chronic kidney disease on chronic dialysis, with long-term current use of insulin (HCC)  (primary encounter diagnosis)  Plan: TSH and Free T4, US THYROID (LSI=[id number])    (Z88.6719,  Z79.4) Type 2 diabetes mellitus with both eyes affected by moderate nonproliferative retinopathy and macular edema, with long-term current use of insulin (HCC)    (E11.42) Type 2 diabetes mellitus with polyneuropathy ([facility])    (I10) Essential hypertension    Pt is doing quite with glycemic control on MDI and enjoys using his CGM. Lives with dgtr who is a pharmacist, monitors on her phone, and administers insulin. Discussed the merits/downsides of mixed humalog vs individual basal/bolus. Given pt's regimented diet and lifestyle, it is reasonable to continue mixed humalog unless his meals change significantly. We discussed the importance of glycemic control to prevent complications of diabetes. We discussed the importance of SBGM and consistent, low carb diet as well as moderate exercise as well.   We also discussed the complications of diabetes include retinopathy, neuropathy, nephropathy and cardiovascular disease.     - Continue humalog 75/25 qAC BID (usually 5-6U depending on meals; consider lowering by 1U for 2nd dose of the day if having repeatedly late night hypoglycemia  - rarely needs mealtime insulin for lunch; will order fast-acting prn  - Dexcom G6-> G7  - Ophtho: +; follow Dr Damian Porter for inj/laser  - BP controlled, on ARB  - LDL at goal, on statin    - ESRD on HD, follows Dr Cristian Wagner  - Neuropathy/ Foot exam: constant; sees podiatrist q2 months  - CAD: none    No follow-ups on file. A total of 60 minutes was spent today on obtaining history, reviewing pertinent labs, evaluating patient, providing multiple treatment options, reinforcing diet/exercise and compliance, and completing documentation.       10/25/2023  Veena Davidson MD

## 2023-10-25 NOTE — TELEPHONE ENCOUNTER
Received phone call from patient's daughter, Monica Blanchard. hospitals insurance is stating too soon for fill of Dexcom G7, and he still has Dexcom G6 sensors at home, just needs a Dexcom G6 transmitter to go with it. Reviewed can also call [employer] Customer Service and review what happened/why sensor had to be removed early- states will do, but in need now. Pending 1 Dexcom G6 transmitter, routing to Dr. Timmy Story for sign off.

## 2023-10-25 NOTE — TELEPHONE ENCOUNTER
Requested Prescriptions     Pending Prescriptions Disp Refills    rOPINIRole 1 MG Oral Tab 90 tablet 0     Sig: Take 1 tablet (1 mg total) by mouth nightly.      Last refill 7/20/23 #90  LOV 10/25/23  Future Appointments   Date Time Provider James Jensen   10/29/2023 10:15 AM Quentin 1923

## 2023-10-25 NOTE — TELEPHONE ENCOUNTER
RN phoned insurance and spoke with representative [facility] [phone] Isabel Pichardo will be faxing PA form to office. Will await fax.

## 2023-10-26 NOTE — PROGRESS NOTES
No acute fractures were seen, except for mild compression deformity on L4, that is stable from before

## 2023-10-26 NOTE — TELEPHONE ENCOUNTER
PA form received in office. Form filled out and faxed to number [phone].     Will await confirmation

## 2023-10-27 ENCOUNTER — TELEPHONE (OUTPATIENT)
Facility: CLINIC | Age: 83
End: 2023-10-27

## 2023-10-27 NOTE — TELEPHONE ENCOUNTER
10/27/23-Received via fax from [address] [place] [address] a denial of coverage for BD Pen Needle Eugenia 2nd Gen 32Gx 4mm. Placed in PA in basket.

## 2023-10-27 NOTE — TELEPHONE ENCOUNTER
RN reviewed denial letter for patients BD pen needles. Denial sent to scanning. Letter states preferred drug as \"[employer] pen needles. \"    RX for [employer] pen needles pended and routed for review. RN phoned daughter Florentin Denton who confirms that they use [employer]. And they have plenty for now. New RX pended and routed to Dr. Doroteo Emery for review. Steroid just for five days today gave him 2 extra units.

## 2023-10-27 NOTE — TELEPHONE ENCOUNTER
RN phoned patients daughter regarding Pen needles for patient. Daughter Rehan Finley let RN know that her Father had started steroids and would be on these for 5 days. Patient is not connected to Dexcom yet. Medication as follows: - Continue humalog 75/25 qAC BID (usually 5-6U depending on meals; consider lowering by 1U for 2nd dose of the day if having repeatedly late night hypoglycemia  - rarely needs mealtime insulin for lunch; will order fast-acting prn  - Dexcom G6-> G7    Jaky states that sugars have been a bit higher but she has treated this by giving an extra 2U, but is aware that sugars may just look a bit higher until steroids are done. RN spoke with DM Educator Maty Delarosa who states that treating with 2 extra U is the right thing to do and sugars will just look a bit higher on steroids. RN phoned Rehan Finley back to let her know this, she verbalized understanding. Rehan Finley will also be generating another share code to share with our office. Patient had just left the house so they will send this code in a [facility].

## 2023-10-29 ENCOUNTER — HOSPITAL ENCOUNTER (OUTPATIENT)
Dept: ULTRASOUND IMAGING | Age: 83
End: 2023-10-29
Attending: STUDENT IN AN ORGANIZED HEALTH CARE EDUCATION/TRAINING PROGRAM
Payer: MEDICAID

## 2023-10-29 ENCOUNTER — HOSPITAL ENCOUNTER (OUTPATIENT)
Dept: ULTRASOUND IMAGING | Age: 83
Discharge: HOME | End: 2023-10-29
Attending: STUDENT IN AN ORGANIZED HEALTH CARE EDUCATION/TRAINING PROGRAM
Payer: MEDICAID

## 2023-10-29 DIAGNOSIS — E11.22 TYPE 2 DIABETES MELLITUS WITH CHRONIC KIDNEY DISEASE ON CHRONIC DIALYSIS, WITH LONG-TERM CURRENT USE OF INSULIN (HCC): ICD-10-CM

## 2023-10-29 DIAGNOSIS — N18.6 TYPE 2 DIABETES MELLITUS WITH CHRONIC KIDNEY DISEASE ON CHRONIC DIALYSIS, WITH LONG-TERM CURRENT USE OF INSULIN (HCC): ICD-10-CM

## 2023-10-29 DIAGNOSIS — Z99.2 TYPE 2 DIABETES MELLITUS WITH CHRONIC KIDNEY DISEASE ON CHRONIC DIALYSIS, WITH LONG-TERM CURRENT USE OF INSULIN (HCC): ICD-10-CM

## 2023-10-29 DIAGNOSIS — Z79.4 TYPE 2 DIABETES MELLITUS WITH CHRONIC KIDNEY DISEASE ON CHRONIC DIALYSIS, WITH LONG-TERM CURRENT USE OF INSULIN (HCC): ICD-10-CM

## 2023-10-29 PROCEDURE — 76536 US EXAM OF HEAD AND NECK: CPT | Performed by: STUDENT IN AN ORGANIZED HEALTH CARE EDUCATION/TRAINING PROGRAM

## 2023-10-30 ENCOUNTER — TELEPHONE (OUTPATIENT)
Facility: CLINIC | Age: 83
End: 2023-10-30

## 2023-10-30 DIAGNOSIS — E04.1 THYROID NODULE: Primary | ICD-10-CM

## 2023-10-30 RX ORDER — PEN NEEDLE, DIABETIC 32GX 5/32"
NEEDLE, DISPOSABLE MISCELLANEOUS
Qty: 200 EACH | Refills: 3 | Status: SHIPPED | OUTPATIENT
Start: 2023-10-30

## 2023-10-30 NOTE — TELEPHONE ENCOUNTER
Spoke with patient's daughter, Graciela Cormier, on telephone re: recent Thyroid US result. \"L nodule seen on thyroid US. No prior comparison, I just felt something on exam. Given size, it is too small to bx. Would recommend repeat in 6 months with another US. Saw he cancelled his f/u. He can ask PCP to order it too if he prefers. \"    Verbalized understanding of results- would like to continue following with Dr. Theron Khalil. Scheduled 6 month f/u appointment on 5/8/24. Pending repeat thyroid US order, routing to Dr. Theron Khalil for sign off.      Also pending repeat TSH/T4.

## 2023-10-31 RX ORDER — LENALIDOMIDE 5 MG/1
CAPSULE ORAL
Qty: 14 CAPSULE | Refills: 0 | OUTPATIENT
Start: 2023-10-31

## 2023-10-31 RX ORDER — LENALIDOMIDE 5 MG/1
1 CAPSULE ORAL DAILY
Qty: 14 CAPSULE | Refills: 0 | Status: SHIPPED | OUTPATIENT
Start: 2023-10-31

## 2023-11-02 ENCOUNTER — MED REC SCAN ONLY (OUTPATIENT)
Facility: CLINIC | Age: 83
End: 2023-11-02

## 2023-11-07 DIAGNOSIS — G62.9 PERIPHERAL POLYNEUROPATHY: Primary | ICD-10-CM

## 2023-11-07 NOTE — TELEPHONE ENCOUNTER
Medication: pregabalin 25 MG Oral Cap      Date of last refill: 08/14/2023 (#90/2)  [place]     Last office visit: 08//14/2023  Due back to clinic per last office note:  Around 02/14/2024  Date next office visit scheduled:    Future Appointments   Date Time Provider James Jensen   1/24/2024 10:30 AM Deidre Schmidt MD [address]   5/8/2024 11:00 AM Sienna Johnston MD [facility] [place]           Last OV note recommendation:    ASSESSMENT/PLAN:      (F01.B0) Moderate vascular dementia, unspecified whether behavioral, psychotic, or mood disturbance or anxiety ([facility] [address]  (primary encounter diagnosis)        (M54.16) Right lumbar radiculopathy              Patient is a 81year old female presenting with progressive memory loss likely vascular dementia     MRI brain shows mild microvascular changes and 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 Take pregabalin 25 mg TID  Side effect explained     Follow up in about 6 months     See orders and medications filed with this encounter. The patient indicates understanding of these issues and agrees with the plan.

## 2023-11-10 RX ORDER — PREGABALIN 25 MG/1
25 CAPSULE ORAL 3 TIMES DAILY
Qty: 90 CAPSULE | Refills: 2 | Status: SHIPPED | OUTPATIENT
Start: 2023-11-10

## 2023-11-24 ENCOUNTER — APPOINTMENT (OUTPATIENT)
Dept: INTERVENTIONAL RADIOLOGY/VASCULAR | Facility: HOSPITAL | Age: 83
End: 2023-11-24
Attending: EMERGENCY MEDICINE
Payer: MEDICAID

## 2023-11-24 ENCOUNTER — APPOINTMENT (OUTPATIENT)
Dept: ULTRASOUND IMAGING | Facility: HOSPITAL | Age: 83
End: 2023-11-24
Attending: HOSPITALIST
Payer: MEDICAID

## 2023-11-24 ENCOUNTER — HOSPITAL ENCOUNTER (OUTPATIENT)
Facility: HOSPITAL | Age: 83
Setting detail: OBSERVATION
Discharge: HOME | End: 2023-11-28
Attending: EMERGENCY MEDICINE | Admitting: HOSPITALIST
Payer: MEDICAID

## 2023-11-24 DIAGNOSIS — T82.9XXA COMPLICATION OF ARTERIOVENOUS DIALYSIS FISTULA, INITIAL ENCOUNTER: Primary | ICD-10-CM

## 2023-11-24 LAB
ALBUMIN SERPL-MCNC: 3.7 G/DL (ref 3.4–5)
ALBUMIN/GLOB SERPL: 1 {RATIO} (ref 1–2)
ALP LIVER SERPL-CCNC: 79 U/L
ALT SERPL-CCNC: 17 U/L
ANION GAP SERPL CALC-SCNC: 10 MMOL/L (ref 0–18)
AST SERPL-CCNC: 3 U/L (ref 15–37)
BASOPHILS # BLD AUTO: 0.03 X10(3) UL (ref 0–0.2)
BASOPHILS NFR BLD AUTO: 0.9 %
BILIRUB SERPL-MCNC: 0.4 MG/DL (ref 0.1–2)
BUN BLD-MCNC: 111 MG/DL (ref 9–23)
CALCIUM BLD-MCNC: 8.9 MG/DL (ref 8.5–10.1)
CHLORIDE SERPL-SCNC: 99 MMOL/L (ref 98–112)
CO2 SERPL-SCNC: 28 MMOL/L (ref 21–32)
CREAT BLD-MCNC: 11.3 MG/DL
EGFRCR SERPLBLD CKD-EPI 2021: 4 ML/MIN/1.73M2 (ref 60–?)
EOSINOPHIL # BLD AUTO: 0.05 X10(3) UL (ref 0–0.7)
EOSINOPHIL NFR BLD AUTO: 1.5 %
ERYTHROCYTE [DISTWIDTH] IN BLOOD BY AUTOMATED COUNT: 15.3 %
EST. AVERAGE GLUCOSE BLD GHB EST-MCNC: 82 MG/DL (ref 68–126)
GLOBULIN PLAS-MCNC: 3.8 G/DL (ref 2.8–4.4)
GLUCOSE BLD-MCNC: 165 MG/DL (ref 70–99)
GLUCOSE BLD-MCNC: 97 MG/DL (ref 70–99)
GLUCOSE BLD-MCNC: 97 MG/DL (ref 70–99)
HBA1C MFR BLD: 4.5 % (ref ?–5.7)
HCT VFR BLD AUTO: 29.7 %
HGB BLD-MCNC: 10.2 G/DL
IMM GRANULOCYTES # BLD AUTO: 0.01 X10(3) UL (ref 0–1)
IMM GRANULOCYTES NFR BLD: 0.3 %
INR BLD: 1.13 (ref 0.8–1.2)
LYMPHOCYTES # BLD AUTO: 1.4 X10(3) UL (ref 1–4)
LYMPHOCYTES NFR BLD AUTO: 42.4 %
MCH RBC QN AUTO: 35.4 PG (ref 26–34)
MCHC RBC AUTO-ENTMCNC: 34.3 G/DL (ref 31–37)
MCV RBC AUTO: 103.1 FL
MONOCYTES # BLD AUTO: 0.41 X10(3) UL (ref 0.1–1)
MONOCYTES NFR BLD AUTO: 12.4 %
NEUTROPHILS # BLD AUTO: 1.4 X10 (3) UL (ref 1.5–7.7)
NEUTROPHILS # BLD AUTO: 1.4 X10(3) UL (ref 1.5–7.7)
NEUTROPHILS NFR BLD AUTO: 42.5 %
OSMOLALITY SERPL CALC.SUM OF ELEC: 323 MOSM/KG (ref 275–295)
PLATELET # BLD AUTO: 130 10(3)UL (ref 150–450)
POTASSIUM SERPL-SCNC: 4.7 MMOL/L (ref 3.5–5.1)
PROT SERPL-MCNC: 7.5 G/DL (ref 6.4–8.2)
PROTHROMBIN TIME: 14.5 SECONDS (ref 11.6–14.8)
RBC # BLD AUTO: 2.88 X10(6)UL
SODIUM SERPL-SCNC: 137 MMOL/L (ref 136–145)
WBC # BLD AUTO: 3.3 X10(3) UL (ref 4–11)

## 2023-11-24 PROCEDURE — 99222 1ST HOSP IP/OBS MODERATE 55: CPT | Performed by: HOSPITALIST

## 2023-11-24 PROCEDURE — 93990 DOPPLER FLOW TESTING: CPT | Performed by: HOSPITALIST

## 2023-11-24 PROCEDURE — 05HY33Z INSERTION OF INFUSION DEVICE INTO UPPER VEIN, PERCUTANEOUS APPROACH: ICD-10-PCS | Performed by: RADIOLOGY

## 2023-11-24 RX ORDER — LENALIDOMIDE 5 MG/1
CAPSULE ORAL
Qty: 14 CAPSULE | Refills: 0 | Status: ON HOLD | OUTPATIENT
Start: 2023-11-24

## 2023-11-24 RX ORDER — FOLIC ACID 1 MG/1
1 TABLET ORAL DAILY
Status: DISCONTINUED | OUTPATIENT
Start: 2023-11-25 | End: 2023-11-28

## 2023-11-24 RX ORDER — BENZONATATE 100 MG/1
200 CAPSULE ORAL 3 TIMES DAILY PRN
Status: DISCONTINUED | OUTPATIENT
Start: 2023-11-24 | End: 2023-11-28

## 2023-11-24 RX ORDER — HEPARIN SODIUM 5000 [USP'U]/ML
5000 INJECTION, SOLUTION INTRAVENOUS; SUBCUTANEOUS EVERY 12 HOURS SCHEDULED
Status: DISCONTINUED | OUTPATIENT
Start: 2023-11-25 | End: 2023-11-28

## 2023-11-24 RX ORDER — DEXTROSE MONOHYDRATE 25 G/50ML
50 INJECTION, SOLUTION INTRAVENOUS
Status: DISCONTINUED | OUTPATIENT
Start: 2023-11-24 | End: 2023-11-28

## 2023-11-24 RX ORDER — ECHINACEA PURPUREA EXTRACT 125 MG
1 TABLET ORAL
Status: DISCONTINUED | OUTPATIENT
Start: 2023-11-24 | End: 2023-11-28

## 2023-11-24 RX ORDER — AMLODIPINE BESYLATE 5 MG/1
10 TABLET ORAL DAILY
Status: DISCONTINUED | OUTPATIENT
Start: 2023-11-24 | End: 2023-11-28

## 2023-11-24 RX ORDER — MELATONIN
3 NIGHTLY PRN
Status: DISCONTINUED | OUTPATIENT
Start: 2023-11-24 | End: 2023-11-28

## 2023-11-24 RX ORDER — LOSARTAN POTASSIUM 100 MG/1
100 TABLET ORAL DAILY
Status: DISCONTINUED | OUTPATIENT
Start: 2023-11-25 | End: 2023-11-28

## 2023-11-24 RX ORDER — ROSUVASTATIN CALCIUM 10 MG/1
10 TABLET, COATED ORAL NIGHTLY
Status: DISCONTINUED | OUTPATIENT
Start: 2023-11-24 | End: 2023-11-28

## 2023-11-24 RX ORDER — CHOLECALCIFEROL (VITAMIN D3) 125 MCG
5 CAPSULE ORAL NIGHTLY
COMMUNITY

## 2023-11-24 RX ORDER — ONDANSETRON 2 MG/ML
4 INJECTION INTRAMUSCULAR; INTRAVENOUS EVERY 6 HOURS PRN
Status: DISCONTINUED | OUTPATIENT
Start: 2023-11-24 | End: 2023-11-28

## 2023-11-24 RX ORDER — LENALIDOMIDE 5 MG/1
5 CAPSULE ORAL DAILY
Status: DISCONTINUED | OUTPATIENT
Start: 2023-11-29 | End: 2023-11-28

## 2023-11-24 RX ORDER — HEPARIN SODIUM 1000 [USP'U]/ML
1.5 INJECTION, SOLUTION INTRAVENOUS; SUBCUTANEOUS ONCE
Status: COMPLETED | OUTPATIENT
Start: 2023-11-24 | End: 2023-11-25

## 2023-11-24 RX ORDER — POLYETHYLENE GLYCOL 3350 17 G/17G
17 POWDER, FOR SOLUTION ORAL DAILY PRN
Status: DISCONTINUED | OUTPATIENT
Start: 2023-11-24 | End: 2023-11-28

## 2023-11-24 RX ORDER — HEPARIN SODIUM 5000 [USP'U]/ML
INJECTION, SOLUTION INTRAVENOUS; SUBCUTANEOUS
Status: COMPLETED
Start: 2023-11-24 | End: 2023-11-24

## 2023-11-24 RX ORDER — PREGABALIN 25 MG/1
25 CAPSULE ORAL 3 TIMES DAILY
Status: DISCONTINUED | OUTPATIENT
Start: 2023-11-24 | End: 2023-11-28

## 2023-11-24 RX ORDER — ALBUTEROL SULFATE 90 UG/1
2 AEROSOL, METERED RESPIRATORY (INHALATION) EVERY 6 HOURS PRN
Status: DISCONTINUED | OUTPATIENT
Start: 2023-11-24 | End: 2023-11-28

## 2023-11-24 RX ORDER — ACETAMINOPHEN 500 MG
500 TABLET ORAL EVERY 4 HOURS PRN
Status: DISCONTINUED | OUTPATIENT
Start: 2023-11-24 | End: 2023-11-28

## 2023-11-24 RX ORDER — CALCIUM ACETATE 667 MG/1
2 CAPSULE ORAL 3 TIMES DAILY
Status: DISCONTINUED | OUTPATIENT
Start: 2023-11-24 | End: 2023-11-28

## 2023-11-24 RX ORDER — ALBUMIN (HUMAN) 12.5 G/50ML
100 SOLUTION INTRAVENOUS AS NEEDED
Status: DISCONTINUED | OUTPATIENT
Start: 2023-11-24 | End: 2023-11-28

## 2023-11-24 RX ORDER — SENNOSIDES 8.6 MG
17.2 TABLET ORAL NIGHTLY PRN
Status: DISCONTINUED | OUTPATIENT
Start: 2023-11-24 | End: 2023-11-28

## 2023-11-24 RX ORDER — LATANOPROST 50 UG/ML
1 SOLUTION/ DROPS OPHTHALMIC DAILY
Status: DISCONTINUED | OUTPATIENT
Start: 2023-11-24 | End: 2023-11-28

## 2023-11-24 RX ORDER — NICOTINE POLACRILEX 4 MG
30 LOZENGE BUCCAL
Status: DISCONTINUED | OUTPATIENT
Start: 2023-11-24 | End: 2023-11-28

## 2023-11-24 RX ORDER — ASPIRIN 81 MG/1
81 TABLET, CHEWABLE ORAL DAILY
Status: DISCONTINUED | OUTPATIENT
Start: 2023-11-25 | End: 2023-11-28

## 2023-11-24 RX ORDER — LIDOCAINE HYDROCHLORIDE 10 MG/ML
INJECTION, SOLUTION INFILTRATION; PERINEURAL
Status: COMPLETED
Start: 2023-11-24 | End: 2023-11-24

## 2023-11-24 RX ORDER — CINACALCET 30 MG/1
30 TABLET, FILM COATED ORAL
Status: DISCONTINUED | OUTPATIENT
Start: 2023-11-25 | End: 2023-11-28

## 2023-11-24 RX ORDER — DOCUSATE SODIUM 100 MG/1
100 CAPSULE, LIQUID FILLED ORAL 2 TIMES DAILY
Status: DISCONTINUED | OUTPATIENT
Start: 2023-11-24 | End: 2023-11-28

## 2023-11-24 RX ORDER — NICOTINE POLACRILEX 4 MG
15 LOZENGE BUCCAL
Status: DISCONTINUED | OUTPATIENT
Start: 2023-11-24 | End: 2023-11-28

## 2023-11-24 RX ORDER — BISACODYL 10 MG
10 SUPPOSITORY, RECTAL RECTAL
Status: DISCONTINUED | OUTPATIENT
Start: 2023-11-24 | End: 2023-11-28

## 2023-11-24 RX ORDER — HYDROCODONE BITARTRATE AND ACETAMINOPHEN 10; 325 MG/1; MG/1
1 TABLET ORAL EVERY 8 HOURS PRN
Status: DISCONTINUED | OUTPATIENT
Start: 2023-11-24 | End: 2023-11-28

## 2023-11-24 RX ORDER — HYDRALAZINE HYDROCHLORIDE 10 MG/1
10 TABLET, FILM COATED ORAL 3 TIMES DAILY PRN
Status: DISCONTINUED | OUTPATIENT
Start: 2023-11-24 | End: 2023-11-28

## 2023-11-24 RX ORDER — ESCITALOPRAM OXALATE 20 MG/1
20 TABLET ORAL EVERY MORNING
Status: DISCONTINUED | OUTPATIENT
Start: 2023-11-25 | End: 2023-11-28

## 2023-11-24 RX ORDER — ROPINIROLE 1 MG/1
1 TABLET, FILM COATED ORAL NIGHTLY
Status: DISCONTINUED | OUTPATIENT
Start: 2023-11-24 | End: 2023-11-28

## 2023-11-24 RX ORDER — HYDRALAZINE HYDROCHLORIDE 10 MG/1
10 TABLET, FILM COATED ORAL 3 TIMES DAILY PRN
COMMUNITY

## 2023-11-24 NOTE — ED QUICK NOTES
Received report from previous RN. Pt resting in bed. Family at bedside. Aware we waiting for IR to come down to take pt for cath placement. No thrill or bruit present on right fistula.

## 2023-11-24 NOTE — ED QUICK NOTES
Orders for admission, patient is aware of plan and ready to go upstairs. Any questions, please call ED RN alaina at extension [phone]. Patient Covid vaccination status: Fully vaccinated     COVID Test Ordered in ED: None    COVID Suspicion at Admission: N/A    Running Infusions:  None    Mental Status/LOC at time of transport: AxO x3    Other pertinent information: On room air. Family at bedside.    CIWA score: N/A   NIH score:  N/A

## 2023-11-24 NOTE — PROCEDURES
[facility] temp HD catheter. Comp-none. Initial placement w/ 16 and 20 cm Sundar catheters, but smooth rapid syringe aspiration could not be achieved.

## 2023-11-25 PROBLEM — N18.6 ANEMIA DUE TO END STAGE RENAL DISEASE  (HCC): Status: ACTIVE | Noted: 2023-11-25

## 2023-11-25 PROBLEM — D63.1 ANEMIA DUE TO END STAGE RENAL DISEASE  (HCC): Status: ACTIVE | Noted: 2023-11-25

## 2023-11-25 PROBLEM — D63.1 ANEMIA DUE TO END STAGE RENAL DISEASE: Status: ACTIVE | Noted: 2023-11-25

## 2023-11-25 PROBLEM — N18.6 ANEMIA DUE TO END STAGE RENAL DISEASE (HCC): Status: ACTIVE | Noted: 2023-11-25

## 2023-11-25 PROBLEM — N18.6 ANEMIA DUE TO END STAGE RENAL DISEASE: Status: ACTIVE | Noted: 2023-11-25

## 2023-11-25 PROBLEM — D63.1 ANEMIA DUE TO END STAGE RENAL DISEASE (HCC): Status: ACTIVE | Noted: 2023-11-25

## 2023-11-25 PROBLEM — Z51.11 CHEMOTHERAPY MANAGEMENT, ENCOUNTER FOR: Status: ACTIVE | Noted: 2023-11-25

## 2023-11-25 LAB
ANION GAP SERPL CALC-SCNC: 8 MMOL/L (ref 0–18)
ATRIAL RATE: 68 BPM
BUN BLD-MCNC: 74 MG/DL (ref 9–23)
CALCIUM BLD-MCNC: 8.6 MG/DL (ref 8.5–10.1)
CHLORIDE SERPL-SCNC: 101 MMOL/L (ref 98–112)
CO2 SERPL-SCNC: 29 MMOL/L (ref 21–32)
CREAT BLD-MCNC: 7.3 MG/DL
EGFRCR SERPLBLD CKD-EPI 2021: 7 ML/MIN/1.73M2 (ref 60–?)
ERYTHROCYTE [DISTWIDTH] IN BLOOD BY AUTOMATED COUNT: 15.2 %
FOLATE SERPL-MCNC: 20.4 NG/ML (ref 8.7–?)
GLUCOSE BLD-MCNC: 157 MG/DL (ref 70–99)
GLUCOSE BLD-MCNC: 172 MG/DL (ref 70–99)
GLUCOSE BLD-MCNC: 90 MG/DL (ref 70–99)
GLUCOSE BLD-MCNC: 99 MG/DL (ref 70–99)
GLUCOSE BLD-MCNC: 99 MG/DL (ref 70–99)
HBV SURFACE AG SER-ACNC: <0.1 [IU]/L
HBV SURFACE AG SERPL QL IA: NONREACTIVE
HCT VFR BLD AUTO: 26.5 %
HGB BLD-MCNC: 9 G/DL
MAGNESIUM SERPL-MCNC: 2.4 MG/DL (ref 1.6–2.6)
MCH RBC QN AUTO: 35.6 PG (ref 26–34)
MCHC RBC AUTO-ENTMCNC: 34 G/DL (ref 31–37)
MCV RBC AUTO: 104.7 FL
OSMOLALITY SERPL CALC.SUM OF ELEC: 308 MOSM/KG (ref 275–295)
P AXIS: 37 DEGREES
P-R INTERVAL: 206 MS
PHOSPHATE SERPL-MCNC: 2.3 MG/DL (ref 2.5–4.9)
PLATELET # BLD AUTO: 106 10(3)UL (ref 150–450)
POTASSIUM SERPL-SCNC: 3.8 MMOL/L (ref 3.5–5.1)
Q-T INTERVAL: 458 MS
QRS DURATION: 148 MS
QTC CALCULATION (BEZET): 487 MS
R AXIS: -31 DEGREES
RBC # BLD AUTO: 2.53 X10(6)UL
SODIUM SERPL-SCNC: 138 MMOL/L (ref 136–145)
T AXIS: 84 DEGREES
VENTRICULAR RATE: 68 BPM
VIT B12 SERPL-MCNC: 686 PG/ML (ref 193–986)
WBC # BLD AUTO: 1.9 X10(3) UL (ref 4–11)

## 2023-11-25 PROCEDURE — 99222 1ST HOSP IP/OBS MODERATE 55: CPT | Performed by: INTERNAL MEDICINE

## 2023-11-25 PROCEDURE — 99232 SBSQ HOSP IP/OBS MODERATE 35: CPT | Performed by: STUDENT IN AN ORGANIZED HEALTH CARE EDUCATION/TRAINING PROGRAM

## 2023-11-25 PROCEDURE — 99245 OFF/OP CONSLTJ NEW/EST HI 55: CPT | Performed by: INTERNAL MEDICINE

## 2023-11-25 PROCEDURE — 5A1D70Z PERFORMANCE OF URINARY FILTRATION, INTERMITTENT, LESS THAN 6 HOURS PER DAY: ICD-10-PCS | Performed by: INTERNAL MEDICINE

## 2023-11-25 NOTE — PLAN OF CARE
Problem: METABOLIC/FLUID AND ELECTROLYTES - ADULT  Goal: Electrolytes maintained within normal limits  Description: INTERVENTIONS:  - Monitor labs and rhythm and assess patient for signs and symptoms of electrolyte imbalances  - Administer electrolyte replacement as ordered  - Monitor response to electrolyte replacements, including rhythm and repeat lab results as appropriate  - Fluid restriction as ordered  - Instruct patient on fluid and nutrition restrictions as appropriate  Outcome: Progressing  Goal: Hemodynamic stability and optimal renal function maintained  Description: INTERVENTIONS:  - Monitor labs and assess for signs and symptoms of volume excess or deficit  - Monitor intake, output and patient weight  - Monitor urine specific gravity, serum osmolarity and serum sodium as indicated or ordered  - Monitor response to interventions for patient's volume status, including labs, urine output, blood pressure (other measures as available)  - Encourage oral intake as appropriate  - Instruct patient on fluid and nutrition restrictions as appropriate  Outcome: Progressing     A/o x4, c/o pain  to newly placed temporary left internal jugular HD catheter, prn pain med given, SBP then improved  to 130's from 160's without prn hydralazine. Had ultrasound of right AVfistula,  Absent of thrill of the above fistula noted. Hemodialysis ordered, per HD nurse nephro is ok to do the dialysis this am, will cont POC. Oncologist  to be notified in am as consult, patient on revlimid.

## 2023-11-25 NOTE — PLAN OF CARE
Pt Aox4. VSS. RA. No complain of N/V/D. Reported back pain. Dialysis scheduled for tomorrow 5 am.  US duplex scan hemodialysis pending. Oncology to be paged in the morning. Temporary dialysis catheter intact. Will continue plan of care.      Problem: METABOLIC/FLUID AND ELECTROLYTES - ADULT  Goal: Electrolytes maintained within normal limits  Description: INTERVENTIONS:  - Monitor labs and rhythm and assess patient for signs and symptoms of electrolyte imbalances  - Administer electrolyte replacement as ordered  - Monitor response to electrolyte replacements, including rhythm and repeat lab results as appropriate  - Fluid restriction as ordered  - Instruct patient on fluid and nutrition restrictions as appropriate  Outcome: Progressing  Goal: Hemodynamic stability and optimal renal function maintained  Description: INTERVENTIONS:  - Monitor labs and assess for signs and symptoms of volume excess or deficit  - Monitor intake, output and patient weight  - Monitor urine specific gravity, serum osmolarity and serum sodium as indicated or ordered  - Monitor response to interventions for patient's volume status, including labs, urine output, blood pressure (other measures as available)  - Encourage oral intake as appropriate  - Instruct patient on fluid and nutrition restrictions as appropriate  Outcome: Progressing

## 2023-11-25 NOTE — PROGRESS NOTES
[facility] Pharmacy Note  Oral Chemotherapy Protocol  Porfirio Ortiz is a 81year old patient who has been prescribed Lenalidomide as a continuation from home by Dr. Naila Brantley. Medication originally prescribed by Dr. Linda Duvall, an [facility] physician. Per P&T protocol, an inpatient oncology consult is required for this medication to be continued during admission. An oncology consult was placed by the ordering provider. The medication will be held until it has been reviewed and approved for inpatient use by an oncologist.     Thank you.   Miguel Ángel AyalaD  11/24/2023  6:01 PM  [address] [place] [address] Extension: [phone]

## 2023-11-25 NOTE — PLAN OF CARE
Pt complains of pain on HD cath site, norco given as needed with relief, had dialysis today and tolerated well, vitals stable.   Problem: METABOLIC/FLUID AND ELECTROLYTES - ADULT  Goal: Electrolytes maintained within normal limits  Description: INTERVENTIONS:  - Monitor labs and rhythm and assess patient for signs and symptoms of electrolyte imbalances  - Administer electrolyte replacement as ordered  - Monitor response to electrolyte replacements, including rhythm and repeat lab results as appropriate  - Fluid restriction as ordered  - Instruct patient on fluid and nutrition restrictions as appropriate  Outcome: Progressing  Goal: Hemodynamic stability and optimal renal function maintained  Description: INTERVENTIONS:  - Monitor labs and assess for signs and symptoms of volume excess or deficit  - Monitor intake, output and patient weight  - Monitor urine specific gravity, serum osmolarity and serum sodium as indicated or ordered  - Monitor response to interventions for patient's volume status, including labs, urine output, blood pressure (other measures as available)  - Encourage oral intake as appropriate  - Instruct patient on fluid and nutrition restrictions as appropriate  Outcome: Progressing     Problem: PAIN - ADULT  Goal: Verbalizes/displays adequate comfort level or patient's stated pain goal  Description: INTERVENTIONS:  - Encourage pt to monitor pain and request assistance  - Assess pain using appropriate pain scale  - Administer analgesics based on type and severity of pain and evaluate response  - Implement non-pharmacological measures as appropriate and evaluate response  - Consider cultural and social influences on pain and pain management  - Manage/alleviate anxiety  - Utilize distraction and/or relaxation techniques  - Monitor for opioid side effects  - Notify MD/LIP if interventions unsuccessful or patient reports new pain  - Anticipate increased pain with activity and pre-medicate as appropriate  Outcome: Progressing     Problem: SAFETY ADULT - FALL  Goal: Free from fall injury  Description: INTERVENTIONS:  - Assess pt frequently for physical needs  - Identify cognitive and physical deficits and behaviors that affect risk of falls.   - [place] fall precautions as indicated by assessment.  - Educate pt/family on patient safety including physical limitations  - Instruct pt to call for assistance with activity based on assessment  - Modify environment to reduce risk of injury  - Provide assistive devices as appropriate  - Consider OT/PT consult to assist with strengthening/mobility  - Encourage toileting schedule  Outcome: Progressing     Problem: RISK FOR INFECTION - ADULT  Goal: Absence of fever/infection during anticipated neutropenic period  Description: INTERVENTIONS  - Monitor WBC  - Administer growth factors as ordered  - Implement neutropenic guidelines  Outcome: Progressing

## 2023-11-26 LAB
GLUCOSE BLD-MCNC: 130 MG/DL (ref 70–99)
GLUCOSE BLD-MCNC: 151 MG/DL (ref 70–99)
GLUCOSE BLD-MCNC: 89 MG/DL (ref 70–99)
GLUCOSE BLD-MCNC: 98 MG/DL (ref 70–99)

## 2023-11-26 PROCEDURE — 99232 SBSQ HOSP IP/OBS MODERATE 35: CPT | Performed by: HOSPITALIST

## 2023-11-26 PROCEDURE — 99232 SBSQ HOSP IP/OBS MODERATE 35: CPT | Performed by: INTERNAL MEDICINE

## 2023-11-26 NOTE — PLAN OF CARE
Complains tolerable pain on HD cath site on left jugular, dressing dry and intact, vitals stable.   Problem: METABOLIC/FLUID AND ELECTROLYTES - ADULT  Goal: Electrolytes maintained within normal limits  Description: INTERVENTIONS:  - Monitor labs and rhythm and assess patient for signs and symptoms of electrolyte imbalances  - Administer electrolyte replacement as ordered  - Monitor response to electrolyte replacements, including rhythm and repeat lab results as appropriate  - Fluid restriction as ordered  - Instruct patient on fluid and nutrition restrictions as appropriate  11/26/2023 1026 by Radha Viveros RN  Outcome: Progressing  11/26/2023 1026 by Radha Viveros RN  Outcome: Not Progressing  Goal: Hemodynamic stability and optimal renal function maintained  Description: INTERVENTIONS:  - Monitor labs and assess for signs and symptoms of volume excess or deficit  - Monitor intake, output and patient weight  - Monitor urine specific gravity, serum osmolarity and serum sodium as indicated or ordered  - Monitor response to interventions for patient's volume status, including labs, urine output, blood pressure (other measures as available)  - Encourage oral intake as appropriate  - Instruct patient on fluid and nutrition restrictions as appropriate  11/26/2023 1026 by Radha Viveros RN  Outcome: Progressing  11/26/2023 1026 by Radha Viveros RN  Outcome: Not Progressing     Problem: PAIN - ADULT  Goal: Verbalizes/displays adequate comfort level or patient's stated pain goal  Description: INTERVENTIONS:  - Encourage pt to monitor pain and request assistance  - Assess pain using appropriate pain scale  - Administer analgesics based on type and severity of pain and evaluate response  - Implement non-pharmacological measures as appropriate and evaluate response  - Consider cultural and social influences on pain and pain management  - Manage/alleviate anxiety  - Utilize distraction and/or relaxation techniques  - Monitor for opioid side effects  - Notify MD/LIP if interventions unsuccessful or patient reports new pain  - Anticipate increased pain with activity and pre-medicate as appropriate  11/26/2023 1026 by Jonatan Davis RN  Outcome: Progressing  11/26/2023 1026 by Jonatan Davis RN  Outcome: Not Progressing     Problem: SAFETY ADULT - FALL  Goal: Free from fall injury  Description: INTERVENTIONS:  - Assess pt frequently for physical needs  - Identify cognitive and physical deficits and behaviors that affect risk of falls.   - [place] fall precautions as indicated by assessment.  - Educate pt/family on patient safety including physical limitations  - Instruct pt to call for assistance with activity based on assessment  - Modify environment to reduce risk of injury  - Provide assistive devices as appropriate  - Consider OT/PT consult to assist with strengthening/mobility  - Encourage toileting schedule  11/26/2023 1026 by Jonatan Davis RN  Outcome: Progressing  11/26/2023 1026 by Jonatan Davis RN  Outcome: Not Progressing     Problem: RISK FOR INFECTION - ADULT  Goal: Absence of fever/infection during anticipated neutropenic period  Description: INTERVENTIONS  - Monitor WBC  - Administer growth factors as ordered  - Implement neutropenic guidelines  11/26/2023 1026 by Jonatan Davis RN  Outcome: Progressing  11/26/2023 1026 by Jonatan Davis RN  Outcome: Not Progressing

## 2023-11-26 NOTE — PHYSICAL THERAPY NOTE
PT orders received and chart reviewed. Per discussion with pt and family, no functional mobility concerns and no concerns regarding managing at home. Pt ambulates mod ind with RW. During discussion pt standing mod ind with RW with good balance and tolerance to standing. PT will sign off. Pt and family in agreement. Please re-order should new functional mobility needs present.

## 2023-11-26 NOTE — PLAN OF CARE
Problem: METABOLIC/FLUID AND ELECTROLYTES - ADULT  Goal: Electrolytes maintained within normal limits  Description: INTERVENTIONS:  - Monitor labs and rhythm and assess patient for signs and symptoms of electrolyte imbalances  - Administer electrolyte replacement as ordered  - Monitor response to electrolyte replacements, including rhythm and repeat lab results as appropriate  - Fluid restriction as ordered  - Instruct patient on fluid and nutrition restrictions as appropriate  Outcome: Progressing  Goal: Hemodynamic stability and optimal renal function maintained  Description: INTERVENTIONS:  - Monitor labs and assess for signs and symptoms of volume excess or deficit  - Monitor intake, output and patient weight  - Monitor urine specific gravity, serum osmolarity and serum sodium as indicated or ordered  - Monitor response to interventions for patient's volume status, including labs, urine output, blood pressure (other measures as available)  - Encourage oral intake as appropriate  - Instruct patient on fluid and nutrition restrictions as appropriate  Outcome: Progressing     Problem: PAIN - ADULT  Goal: Verbalizes/displays adequate comfort level or patient's stated pain goal  Description: INTERVENTIONS:  - Encourage pt to monitor pain and request assistance  - Assess pain using appropriate pain scale  - Administer analgesics based on type and severity of pain and evaluate response  - Implement non-pharmacological measures as appropriate and evaluate response  - Consider cultural and social influences on pain and pain management  - Manage/alleviate anxiety  - Utilize distraction and/or relaxation techniques  - Monitor for opioid side effects  - Notify MD/LIP if interventions unsuccessful or patient reports new pain  - Anticipate increased pain with activity and pre-medicate as appropriate  Outcome: Progressing   Resting in bed , mild pain to left neck ( dialysis catheter site) , norco given with  good relief, vital signs stable, denies SOB. Left limb restrictions re: presence of AV fistula.

## 2023-11-27 ENCOUNTER — APPOINTMENT (OUTPATIENT)
Dept: INTERVENTIONAL RADIOLOGY/VASCULAR | Facility: HOSPITAL | Age: 83
End: 2023-11-27
Attending: INTERNAL MEDICINE
Payer: MEDICAID

## 2023-11-27 ENCOUNTER — MED REC SCAN ONLY (OUTPATIENT)
Dept: FAMILY MEDICINE CLINIC | Facility: CLINIC | Age: 83
End: 2023-11-27

## 2023-11-27 LAB
ANION GAP SERPL CALC-SCNC: 12 MMOL/L (ref 0–18)
BUN BLD-MCNC: 84 MG/DL (ref 9–23)
CALCIUM BLD-MCNC: 8.7 MG/DL (ref 8.5–10.1)
CHLORIDE SERPL-SCNC: 99 MMOL/L (ref 98–112)
CO2 SERPL-SCNC: 24 MMOL/L (ref 21–32)
CREAT BLD-MCNC: 10 MG/DL
EGFRCR SERPLBLD CKD-EPI 2021: 5 ML/MIN/1.73M2 (ref 60–?)
GLUCOSE BLD-MCNC: 120 MG/DL (ref 70–99)
GLUCOSE BLD-MCNC: 82 MG/DL (ref 70–99)
GLUCOSE BLD-MCNC: 93 MG/DL (ref 70–99)
GLUCOSE BLD-MCNC: 94 MG/DL (ref 70–99)
GLUCOSE BLD-MCNC: 95 MG/DL (ref 70–99)
OSMOLALITY SERPL CALC.SUM OF ELEC: 305 MOSM/KG (ref 275–295)
POTASSIUM SERPL-SCNC: 5.5 MMOL/L (ref 3.5–5.1)
SODIUM SERPL-SCNC: 135 MMOL/L (ref 136–145)

## 2023-11-27 PROCEDURE — 99233 SBSQ HOSP IP/OBS HIGH 50: CPT | Performed by: INTERNAL MEDICINE

## 2023-11-27 PROCEDURE — 99232 SBSQ HOSP IP/OBS MODERATE 35: CPT | Performed by: HOSPITALIST

## 2023-11-27 PROCEDURE — 03773ZZ DILATION OF RIGHT BRACHIAL ARTERY, PERCUTANEOUS APPROACH: ICD-10-PCS | Performed by: STUDENT IN AN ORGANIZED HEALTH CARE EDUCATION/TRAINING PROGRAM

## 2023-11-27 PROCEDURE — 3E05317 INTRODUCTION OF OTHER THROMBOLYTIC INTO PERIPHERAL ARTERY, PERCUTANEOUS APPROACH: ICD-10-PCS | Performed by: STUDENT IN AN ORGANIZED HEALTH CARE EDUCATION/TRAINING PROGRAM

## 2023-11-27 RX ORDER — HEPARIN SODIUM 5000 [USP'U]/ML
INJECTION, SOLUTION INTRAVENOUS; SUBCUTANEOUS
Status: COMPLETED
Start: 2023-11-27 | End: 2023-11-27

## 2023-11-27 RX ORDER — IODIXANOL 320 MG/ML
50 INJECTION, SOLUTION INTRAVASCULAR
Status: COMPLETED | OUTPATIENT
Start: 2023-11-27 | End: 2023-11-27

## 2023-11-27 RX ORDER — LIDOCAINE HYDROCHLORIDE 10 MG/ML
INJECTION, SOLUTION INFILTRATION; PERINEURAL
Status: COMPLETED
Start: 2023-11-27 | End: 2023-11-27

## 2023-11-27 RX ORDER — MIDAZOLAM HYDROCHLORIDE 1 MG/ML
INJECTION INTRAMUSCULAR; INTRAVENOUS
Status: COMPLETED
Start: 2023-11-27 | End: 2023-11-27

## 2023-11-27 RX ORDER — WATER 10 ML/10ML
INJECTION INTRAMUSCULAR; INTRAVENOUS; SUBCUTANEOUS
Status: COMPLETED
Start: 2023-11-27 | End: 2023-11-27

## 2023-11-27 NOTE — PLAN OF CARE
Patient is A/O x4. NPO at [place] to declog right arm fistula. VSS and afebrile. Needs addressed. Call light placed within reach. [id number] Hem/ Onc clarification if to restart Revlimid today, per Onc to continue to hold. Endorsed to am RN. Problem: PAIN - ADULT  Goal: Verbalizes/displays adequate comfort level or patient's stated pain goal  Description: INTERVENTIONS:  - Encourage pt to monitor pain and request assistance  - Assess pain using appropriate pain scale  - Administer analgesics based on type and severity of pain and evaluate response  - Implement non-pharmacological measures as appropriate and evaluate response  - Consider cultural and social influences on pain and pain management  - Manage/alleviate anxiety  - Utilize distraction and/or relaxation techniques  - Monitor for opioid side effects  - Notify MD/LIP if interventions unsuccessful or patient reports new pain  - Anticipate increased pain with activity and pre-medicate as appropriate  Outcome: Progressing     Problem: SAFETY ADULT - FALL  Goal: Free from fall injury  Description: INTERVENTIONS:  - Assess pt frequently for physical needs  - Identify cognitive and physical deficits and behaviors that affect risk of falls.   - [place] fall precautions as indicated by assessment.  - Educate pt/family on patient safety including physical limitations  - Instruct pt to call for assistance with activity based on assessment  - Modify environment to reduce risk of injury  - Provide assistive devices as appropriate  - Consider OT/PT consult to assist with strengthening/mobility  - Encourage toileting schedule  Outcome: Progressing     Problem: RISK FOR INFECTION - ADULT  Goal: Absence of fever/infection during anticipated neutropenic period  Description: INTERVENTIONS  - Monitor WBC  - Administer growth factors as ordered  - Implement neutropenic guidelines  Outcome: Progressing

## 2023-11-27 NOTE — INTERVAL H&P NOTE
The above referenced H&P was reviewed by Laurita Pacheco MD on 11/27/2023, the patient was examined and no significant changes have occurred in the patient's condition since the H&P was performed. Risks, benefits, alternative treatments and consequences of no treatment were discussed. We will proceed with procedure as planned.       Laurita Pacheco MD  11/27/2023  3:51 PM

## 2023-11-27 NOTE — PROCEDURES
[facility]  Procedure Note    Dunia Hull Patient Status:  Observation    10/3/1940 MRN [id number]   [facility] 4NW-A Attending Akshat Robert MD   Hosp Day # 0 PCP Keena Vergara MD     Procedure: RUE AV graft declot    Pre-Procedure Diagnosis:  AV fistula/graft thrombosis    Post-Procedure Diagnosis: Same    Anesthesia:  Local and Sedation    Findings: Thrombosed RUE graft. Successful pharmacologic and mechanical thrombolysis with restoration of brisk flow and palpable thrill.     Specimens: None    Blood Loss:  <5ml    Tourniquet Time: 0  Complications:  None  Drains:  None    Secondary Diagnosis:  None    Keyshawn Paniagua MD  2023

## 2023-11-27 NOTE — PROGRESS NOTES
Patient Name: Gee Holt 83 yrs  YOB: 1940  Medical Record Number: [id number]  CSN: [id number]  Date of Admission: 11/24/2023  Attending Physician: Dr. Kait Wagner covered by Dr. Linda Marroquin  Reason for Consult: Review patient's own ORAL CHEMOTHERAPY home medication for continuation inpatient       The [facility] makes medical notes like these available to patients in the interest of transparency. Please be advised this is a medical document. Medical documents are intended to carry relevant information, facts as evident, and the clinical opinion of the practitioner. The medical note is intended as peer to peer communication and may appear blunt or direct. It is written in medical language and may contain abbreviations or verbiage that are unfamiliar. Review of chart completed, ok for patient to hold Revlimid during admission and resume at time of discharge. Discussed with Dr. Sarai Arreguin who is in agreement. Please do not hesitate to reach out with any additional questions or concerns.     Electronically Signed by:    Aravind Ramon DNP, AGPCNP-BC  Nurse Practitioner  Hematology and Oncology

## 2023-11-27 NOTE — PROGRESS NOTES
Back from declotting of rt av fistula. Ok to use for dialysis. Denies any pain or discomfort,Started on renal diet,Sched meds given and tolerated well,For hemodialysis today  Per Dr. Matilde Verduzco is aware.

## 2023-11-27 NOTE — OCCUPATIONAL THERAPY NOTE
Received order for OT evaluation via mobility screening. Per PT note and RN, independent with mobility. No ADL concerns. No skilled OT needs at this time. Will sign off.

## 2023-11-28 VITALS
OXYGEN SATURATION: 98 % | WEIGHT: 158.63 LBS | DIASTOLIC BLOOD PRESSURE: 64 MMHG | TEMPERATURE: 98 F | RESPIRATION RATE: 13 BRPM | SYSTOLIC BLOOD PRESSURE: 118 MMHG | HEART RATE: 62 BPM | BODY MASS INDEX: 23 KG/M2

## 2023-11-28 PROBLEM — I10 PRIMARY HYPERTENSION: Status: ACTIVE | Noted: 2020-11-12

## 2023-11-28 LAB
ANION GAP SERPL CALC-SCNC: 12 MMOL/L (ref 0–18)
BUN BLD-MCNC: 111 MG/DL (ref 9–23)
CALCIUM BLD-MCNC: 8.8 MG/DL (ref 8.5–10.1)
CHLORIDE SERPL-SCNC: 99 MMOL/L (ref 98–112)
CO2 SERPL-SCNC: 23 MMOL/L (ref 21–32)
CREAT BLD-MCNC: 11.9 MG/DL
EGFRCR SERPLBLD CKD-EPI 2021: 4 ML/MIN/1.73M2 (ref 60–?)
GLUCOSE BLD-MCNC: 131 MG/DL (ref 70–99)
GLUCOSE BLD-MCNC: 161 MG/DL (ref 70–99)
GLUCOSE BLD-MCNC: 199 MG/DL (ref 70–99)
GLUCOSE BLD-MCNC: 84 MG/DL (ref 70–99)
GLUCOSE BLD-MCNC: 86 MG/DL (ref 70–99)
OSMOLALITY SERPL CALC.SUM OF ELEC: 312 MOSM/KG (ref 275–295)
POTASSIUM SERPL-SCNC: 5.9 MMOL/L (ref 3.5–5.1)
SODIUM SERPL-SCNC: 134 MMOL/L (ref 136–145)

## 2023-11-28 PROCEDURE — 99233 SBSQ HOSP IP/OBS HIGH 50: CPT | Performed by: INTERNAL MEDICINE

## 2023-11-28 PROCEDURE — 99239 HOSP IP/OBS DSCHRG MGMT >30: CPT | Performed by: HOSPITALIST

## 2023-11-28 NOTE — PLAN OF CARE
Problem: SAFETY ADULT - FALL  Goal: Free from fall injury  Description: INTERVENTIONS:  - Assess pt frequently for physical needs  - Identify cognitive and physical deficits and behaviors that affect risk of falls. - [place] fall precautions as indicated by assessment.  - Educate pt/family on patient safety including physical limitations  - Instruct pt to call for assistance with activity based on assessment  - Modify environment to reduce risk of injury  - Provide assistive devices as appropriate  - Consider OT/PT consult to assist with strengthening/mobility  - Encourage toileting schedule  Outcome: Progressing   Was npo this am for rt av fistula de clotting,Sched meds given and tolerated well,  Up to bathroom w/ assist,Assisted needs.

## 2023-11-28 NOTE — PLAN OF CARE
Patient is A/O x4, c/o moderate knee pain, given acetaminophen. Right AV fistula observed to be + on bruit and thrill, mild tenderness on site from de clogging this am with IR; site is covered with gauze and transparent dressing, no bleeding noted. HD in the morning. Denies any form of respiratory distress. Needs addressed. Call light within reach. [id number] [facility] called, waiting for eta. Problem: METABOLIC/FLUID AND ELECTROLYTES - ADULT  Goal: Electrolytes maintained within normal limits  Description: INTERVENTIONS:  - Monitor labs and rhythm and assess patient for signs and symptoms of electrolyte imbalances  - Administer electrolyte replacement as ordered  - Monitor response to electrolyte replacements, including rhythm and repeat lab results as appropriate  - Fluid restriction as ordered  - Instruct patient on fluid and nutrition restrictions as appropriate  Outcome: Progressing  Goal: Hemodynamic stability and optimal renal function maintained  Description: INTERVENTIONS:  - Monitor labs and assess for signs and symptoms of volume excess or deficit  - Monitor intake, output and patient weight  - Monitor urine specific gravity, serum osmolarity and serum sodium as indicated or ordered  - Monitor response to interventions for patient's volume status, including labs, urine output, blood pressure (other measures as available)  - Encourage oral intake as appropriate  - Instruct patient on fluid and nutrition restrictions as appropriate  Outcome: Progressing     Problem: SAFETY ADULT - FALL  Goal: Free from fall injury  Description: INTERVENTIONS:  - Assess pt frequently for physical needs  - Identify cognitive and physical deficits and behaviors that affect risk of falls.   - [place] fall precautions as indicated by assessment.  - Educate pt/family on patient safety including physical limitations  - Instruct pt to call for assistance with activity based on assessment  - Modify environment to reduce risk of injury  - Provide assistive devices as appropriate  - Consider OT/PT consult to assist with strengthening/mobility  - Encourage toileting schedule  Outcome: Progressing

## 2023-11-28 NOTE — CM/SW NOTE
SW noted [facility] consult order. SW reviewed AVS and mental health resources have been added for patient's review. Patient has anticipated DC today per RN. SW will fax clinical updates to [place] [phone] and notified them of patient DC today. Anticipate return to OP HD clinic on Thursday. SW will continue to follow for plan of care changes and remain available for any additional DC needs or concerns.      Callie Simons MSW, [facility]  Discharge Planner   [phone]

## 2023-11-29 ENCOUNTER — TELEPHONE (OUTPATIENT)
Dept: FAMILY MEDICINE CLINIC | Facility: CLINIC | Age: 83
End: 2023-11-29

## 2023-11-29 ENCOUNTER — PATIENT OUTREACH (OUTPATIENT)
Dept: CASE MANAGEMENT | Age: 83
End: 2023-11-29

## 2023-11-29 DIAGNOSIS — N18.6 ESRD (END STAGE RENAL DISEASE) ON DIALYSIS (HCC): Primary | ICD-10-CM

## 2023-11-29 DIAGNOSIS — Z99.2 ESRD (END STAGE RENAL DISEASE) ON DIALYSIS (HCC): Primary | ICD-10-CM

## 2023-11-29 PROCEDURE — 1111F DSCHRG MED/CURRENT MED MERGE: CPT

## 2023-11-29 NOTE — TELEPHONE ENCOUNTER
Scheduled    Future Appointments   Date Time Provider James Jensen   12/4/2023  6:30 PM Malou Reyna MD [facility]   1/24/2024 10:30 AM Benson Garcia MD [facility] HEM [address]   5/8/2024 11:00 AM Westley Miranda MD [facility] EM[facility] [place]

## 2023-11-29 NOTE — TELEPHONE ENCOUNTER
TERRENCE, Spoke to patient's daughter Michelle Gar for [place] today. She declined to schedule an appt at this time stating that pt is going for HD tomorrow and they will know then if HD cath is working properly. HFU appointment recommended by 12/5/23 as patient is a High risk for readmission and no later than 12/12/23.

## 2023-11-29 NOTE — PLAN OF CARE
Pt A/O x4. VSS. Afebrile. Pt denies pain throughout day. Medications admin per MAR. Pt ambulated safely in room w/ standby assist. Pt receiving HD today. Per nephrology, temporary dialysis cath okay to remove after dialysis. Pt cleared for DC. POC and DC after dialysis endorsed to night RN. Fall and safety precautions in place. Call light within reach.

## 2023-11-29 NOTE — PROGRESS NOTES
Patient alert and oriented x 4, LAC saline lock removed, left jugular temporary catheter removed and pressure held on site for extended amount of time, pressure dressing secured and no bleeding or complications noted with removal, family at bedside, patient discharged via wheelchair accompanied by family, all belongings with patient and discharge instructions with verbalized understanding.

## 2023-11-29 NOTE — PROGRESS NOTES
Initial Post Discharge Follow Up   Discharge Date: 11/28/23  Contact Date: 11/29/2023    Consent Verification:  Assessment Completed With: Other: daughter Nash Sinclair received per patient?  written  HIPAA Verified? Yes    Discharge Dx:   Complication of arteriovenous dialysis fistula, initial encounter     General:   How have you been since your discharge from the hospital?  We find out after his dialysis tomorrow if the catheter is working. Do you have any pain since discharge? No    When you were leaving the hospital were your discharge instructions reviewed with you? Yes  Do you have any questions about your discharge instructions? No  Before leaving the hospital was your diagnoses explained to you? Yes  Do you have any questions about your diagnoses? No  Are you able to perform normal daily activities of living as you have prior to your hospital stay (dressing, bathing, ambulating to the bathroom, etc)? yes  (NCM) Was patient given a different diet per AVS? no      Medications:   Current Outpatient Medications   Medication Sig Dispense Refill    REVLIMID 5 MG Oral Cap TAKE 1 CAPSULE DAILY FOR 14 DAYS OF A 28 DAY CYCLE (Patient taking differently: Take 5 mg by mouth daily. Finished 14 days cycle on 11/14) 14 capsule 0    hydrALAZINE 10 MG Oral Tab Take 1 tablet (10 mg total) by mouth 3 (three) times daily as needed (if  or grater). Give 10-20 mg  Give 50 mg if  or above      Melatonin 5 MG Oral Tab Take 1 tablet (5 mg total) by mouth nightly. pregabalin 25 MG Oral Cap Take 1 capsule (25 mg total) by mouth 3 (three) times daily. 90 capsule 2    Insulin Pen Needle (TRUEPLUS PEN NEEDLES) 32G X 4 MM Does not apply Misc 1 each by other route in the morning and 1 each before bedtime 200 each 3    Continuous Blood Gluc Sensor (DEXCOM G7 SENSOR) Does not apply Misc 1 each Every 10 days. 10 each 1    Glucose Blood (ONETOUCH VERIO) In Vitro Strip 1 each by Other route daily.  100 strip 1 Insulin Lispro Prot & Lispro (HUMALOG MIX 75/25 KWIKPEN) (75-25) 100 UNIT/ML SC SUPN Inject 20 Units into the skin in the morning and 20 Units before bedtime. (Patient taking differently: Inject 5 Units into the skin in the morning and 5 Units before bedtime. Per sliding scale .) 5 each 1    amLODIPine 10 MG Oral Tab Take 1 tablet (10 mg total) by mouth daily. 90 tablet 1    losartan 100 MG Oral Tab Take 1 tablet (100 mg total) by mouth daily. 90 tablet 1    rosuvastatin 10 MG Oral Tab Take 1 tablet (10 mg total) by mouth nightly. 90 tablet 1    albuterol 108 (90 Base) MCG/ACT Inhalation Aero Soln Inhale 2 puffs into the lungs every 6 (six) hours as needed for Wheezing. 1 each 0    escitalopram 20 MG Oral Tab 20 mg. 1 tablet 90 tablet 1    rOPINIRole 1 MG Oral Tab Take 1 tablet (1 mg total) by mouth nightly. 90 tablet 0    Continuous Blood Gluc Transmit (DEXCOM G6 TRANSMITTER) Does not apply Misc 1 each As Directed. Use with Dexcom G6 sensor. 1 every 90 days. 1 each 0    folic acid 1 MG Oral Tab Take 1 tablet (1 mg total) by mouth daily. 90 tablet 0    calcium acetate 667 MG Oral Cap Take 2 capsules (1,334 mg total) by mouth 3 (three) times daily. 180 capsule 0    HYDROcodone-acetaminophen (NORCO)  MG Oral Tab Take 1 tablet by mouth every 8 (eight) hours as needed for Pain. 30 tablet 0    LATANOPROST OP Apply to eye.      cinacalcet 30 MG Oral Tab Take 1 tablet (30 mg total) by mouth 3 (three) times a week. T, Th, Sat      Insulin Pen Needle (TRUEPLUS 5-BEVEL PEN NEEDLES) 32G X 4 MM Does not apply Misc Test 2 times daily. 200 each 3    OneTouch Delica Lancets 24K Does not apply Misc Test once daily 100 each 3    Blood Glucose Monitoring Suppl (Shania Reynolds) w/Device Does not apply Kit 1 kit by Does not apply route as needed. 1 kit 0    BABY ASPIRIN OR Take 81 mg by mouth daily. docusate sodium 100 MG Oral Cap 1 capsule (100 mg total) 2 (two) times daily.  PRN       Were there any changes to your current medication(s) noted on the AVS? Yes  If so, were these medication changes discussed with you prior to leaving the hospital? Yes  If a new medication was prescribed:  n/a  Let's go over your medications together to make sure we are not missing anything. Medications Reviewed  Are there any reasons that keep you from taking your medication as prescribed? No  Are you having any concerns with constipation? No  Did patient receive their flu shot (Sept-March)? Yes    Discharge medications reviewed/discussed/and reconciled against outpatient medications with patient. Any changes or updates to medications sent to PCP. Patient Acknowledged     Referrals/orders at D/C:  Referrals/orders placed at D/C? no    DME ordered at D/C? No      Discharge orders, AVS reviewed and discussed with patient. Any changes or updates to orders sent to PCP.   Patient Acknowledged      SDOH:   Transportation Needs: No Transportation Needs (11/24/2023)    Transportation Needs     Lack of Transportation: No     Financial Resource Strain: Low Risk  (11/29/2023)    Financial Resource Strain     Difficulty of Paying Living Expenses: Not hard at all     Med Affordability: No       Follow up appointments:      Your appointments       Date & Time Appointment Department [facility])    Jan 24, 2024 10:30 AM CST HEMATOLOGY ONCOLOGY FOLLOW UP with Zoe Li MD [facility] in [address] [place] [address])        May 08, 2024 11:00 AM CDT Follow Up Visit with Marisol Barry MD [address] [facility] ([address])              [facility] in [facility]  [address] [place] [address] [facility], [address] [place] [address] [facility] [address], [address] [place] [address] [phone]            TCC  Was TCC ordered: No    PCP (If no TCC appointment)  Does patient already have a PCP appointment scheduled? No  NCM Attempted to schedule PCP office HFU appointment with patient   If no appointment scheduled: Explain-dtr declined stating that pt has HD tomorrow and they will see if HD Cath is working properly    Specialist    Does the patient have any other follow up appointment(s) needing to be scheduled? No      Is there any reason as to why you cannot make your appointment(s)? No     Needs post D/C:   Now that you are home, are there any needs or concerns you need addressed before your next visit with your PCP?  (DME, meds, questions, etc.): No    Interventions by NCM:   NCM reviewed discharge instructions and when to seek medical attention with the patient's daughter Ti Martínez. She states that pt had a restless night last night but not sure why. He has not complained of any pain. She denies that the has had any fever, n/v/c/d, sob or any new or worsening symptoms. She states that he has HD tomorrow and they will know at that time if the HD catheter works as it is supposed to. NCM inquired as to how the temp cath site looked and she stated that she did not get a chance to look at it but his other daughter, who is with him at this time, is an MD and would report if anything looked off. She states that she did not get a chance to change his Dexcom this morning and will do it when she is off of work. Med review completed. She denied having any questions or concerns for PCP at this time.      CCM referral placed:    Not Applicable    BOOK BY DATE: 12/12/23

## 2023-12-01 ENCOUNTER — PATIENT MESSAGE (OUTPATIENT)
Dept: FAMILY MEDICINE CLINIC | Facility: CLINIC | Age: 83
End: 2023-12-01

## 2023-12-04 ENCOUNTER — OFFICE VISIT (OUTPATIENT)
Dept: FAMILY MEDICINE CLINIC | Facility: CLINIC | Age: 83
End: 2023-12-04
Payer: MEDICAID

## 2023-12-04 ENCOUNTER — TELEPHONE (OUTPATIENT)
Dept: FAMILY MEDICINE CLINIC | Facility: CLINIC | Age: 83
End: 2023-12-04

## 2023-12-04 VITALS
OXYGEN SATURATION: 96 % | HEIGHT: 69 IN | DIASTOLIC BLOOD PRESSURE: 52 MMHG | SYSTOLIC BLOOD PRESSURE: 134 MMHG | HEART RATE: 67 BPM | RESPIRATION RATE: 16 BRPM | BODY MASS INDEX: 23.7 KG/M2 | TEMPERATURE: 97 F | WEIGHT: 160 LBS

## 2023-12-04 DIAGNOSIS — G47.00 INSOMNIA, UNSPECIFIED TYPE: ICD-10-CM

## 2023-12-04 DIAGNOSIS — E11.59 HYPERTENSION ASSOCIATED WITH DIABETES: ICD-10-CM

## 2023-12-04 DIAGNOSIS — R53.1 GENERALIZED WEAKNESS: ICD-10-CM

## 2023-12-04 DIAGNOSIS — I15.2 HYPERTENSION ASSOCIATED WITH DIABETES: ICD-10-CM

## 2023-12-04 DIAGNOSIS — C90.00 MULTIPLE MYELOMA, REMISSION STATUS UNSPECIFIED (HCC): ICD-10-CM

## 2023-12-04 DIAGNOSIS — Z99.2 ESRD ON HEMODIALYSIS (HCC): Primary | ICD-10-CM

## 2023-12-04 DIAGNOSIS — N18.6 ESRD ON HEMODIALYSIS (HCC): Primary | ICD-10-CM

## 2023-12-04 PROBLEM — E78.00 HYPERCHOLESTEREMIA: Status: ACTIVE | Noted: 2021-01-05

## 2023-12-04 PROCEDURE — 3008F BODY MASS INDEX DOCD: CPT | Performed by: FAMILY MEDICINE

## 2023-12-04 PROCEDURE — 3078F DIAST BP <80 MM HG: CPT | Performed by: FAMILY MEDICINE

## 2023-12-04 PROCEDURE — 3075F SYST BP GE 130 - 139MM HG: CPT | Performed by: FAMILY MEDICINE

## 2023-12-04 PROCEDURE — 99214 OFFICE O/P EST MOD 30 MIN: CPT | Performed by: FAMILY MEDICINE

## 2023-12-04 PROCEDURE — 1111F DSCHRG MED/CURRENT MED MERGE: CPT | Performed by: FAMILY MEDICINE

## 2023-12-04 RX ORDER — LENALIDOMIDE 5 MG/1
CAPSULE ORAL
COMMUNITY
Start: 2023-12-04

## 2023-12-05 NOTE — TELEPHONE ENCOUNTER
Sutures from the right upper extremity can be removed after December 7, 2023 at the dialysis center    Hilda Maya MD   [address] [place] [address]    Electronically signed

## 2023-12-06 NOTE — TELEPHONE ENCOUNTER
From: Ana Garcia  To: Lilli Briscoe  Sent: 12/1/2023 4:39 PM CST  Subject: suture    wanted to know if the dialysis center can go ahead and remove the sutures. They called me asking about it and I don't remember anyone mentioning about it at discharge time. They can do it but just need a go ahead from a doctor. please let me know how to proceed.   Thank you

## 2023-12-15 ENCOUNTER — LAB ENCOUNTER (OUTPATIENT)
Dept: LAB | Facility: HOSPITAL | Age: 83
End: 2023-12-15
Attending: INTERNAL MEDICINE
Payer: MEDICAID

## 2023-12-15 ENCOUNTER — HOSPITAL ENCOUNTER (OUTPATIENT)
Dept: CV DIAGNOSTICS | Facility: HOSPITAL | Age: 83
Discharge: HOME | End: 2023-12-15
Attending: INTERNAL MEDICINE
Payer: MEDICAID

## 2023-12-15 DIAGNOSIS — R06.02 SOB (SHORTNESS OF BREATH): ICD-10-CM

## 2023-12-15 DIAGNOSIS — I44.7 LBBB (LEFT BUNDLE BRANCH BLOCK): ICD-10-CM

## 2023-12-15 DIAGNOSIS — I10 PRIMARY HYPERTENSION: ICD-10-CM

## 2023-12-15 DIAGNOSIS — C90.00 MULTIPLE MYELOMA NOT HAVING ACHIEVED REMISSION (HCC): ICD-10-CM

## 2023-12-15 LAB
ALBUMIN SERPL-MCNC: 3.8 G/DL (ref 3.4–5)
ALBUMIN/GLOB SERPL: 1.1 {RATIO} (ref 1–2)
ALP LIVER SERPL-CCNC: 85 U/L
ALT SERPL-CCNC: 19 U/L
ANION GAP SERPL CALC-SCNC: 5 MMOL/L (ref 0–18)
AST SERPL-CCNC: 13 U/L (ref 15–37)
BASOPHILS # BLD AUTO: 0.01 X10(3) UL (ref 0–0.2)
BASOPHILS NFR BLD AUTO: 0.3 %
BILIRUB SERPL-MCNC: 0.6 MG/DL (ref 0.1–2)
BUN BLD-MCNC: 38 MG/DL (ref 9–23)
CALCIUM BLD-MCNC: 8.7 MG/DL (ref 8.5–10.1)
CHLORIDE SERPL-SCNC: 101 MMOL/L (ref 98–112)
CO2 SERPL-SCNC: 34 MMOL/L (ref 21–32)
CREAT BLD-MCNC: 6.57 MG/DL
EGFRCR SERPLBLD CKD-EPI 2021: 8 ML/MIN/1.73M2 (ref 60–?)
EOSINOPHIL # BLD AUTO: 0.19 X10(3) UL (ref 0–0.7)
EOSINOPHIL NFR BLD AUTO: 5.8 %
ERYTHROCYTE [DISTWIDTH] IN BLOOD BY AUTOMATED COUNT: 15.5 %
FASTING STATUS PATIENT QL REPORTED: YES
GLOBULIN PLAS-MCNC: 3.4 G/DL (ref 2.8–4.4)
GLUCOSE BLD-MCNC: 97 MG/DL (ref 70–99)
HCT VFR BLD AUTO: 34.5 %
HGB BLD-MCNC: 11.4 G/DL
IMM GRANULOCYTES # BLD AUTO: 0.01 X10(3) UL (ref 0–1)
IMM GRANULOCYTES NFR BLD: 0.3 %
LYMPHOCYTES # BLD AUTO: 1.45 X10(3) UL (ref 1–4)
LYMPHOCYTES NFR BLD AUTO: 44.3 %
MCH RBC QN AUTO: 35.7 PG (ref 26–34)
MCHC RBC AUTO-ENTMCNC: 33 G/DL (ref 31–37)
MCV RBC AUTO: 108.2 FL
MONOCYTES # BLD AUTO: 0.28 X10(3) UL (ref 0.1–1)
MONOCYTES NFR BLD AUTO: 8.6 %
NEUTROPHILS # BLD AUTO: 1.33 X10 (3) UL (ref 1.5–7.7)
NEUTROPHILS # BLD AUTO: 1.33 X10(3) UL (ref 1.5–7.7)
NEUTROPHILS NFR BLD AUTO: 40.7 %
OSMOLALITY SERPL CALC.SUM OF ELEC: 299 MOSM/KG (ref 275–295)
PLATELET # BLD AUTO: 76 10(3)UL (ref 150–450)
POTASSIUM SERPL-SCNC: 4.1 MMOL/L (ref 3.5–5.1)
PROT SERPL-MCNC: 7.2 G/DL (ref 6.4–8.2)
RBC # BLD AUTO: 3.19 X10(6)UL
SODIUM SERPL-SCNC: 140 MMOL/L (ref 136–145)
WBC # BLD AUTO: 3.3 X10(3) UL (ref 4–11)

## 2023-12-15 PROCEDURE — 83521 IG LIGHT CHAINS FREE EACH: CPT

## 2023-12-15 PROCEDURE — 84165 PROTEIN E-PHORESIS SERUM: CPT

## 2023-12-15 PROCEDURE — 85025 COMPLETE CBC W/AUTO DIFF WBC: CPT

## 2023-12-15 PROCEDURE — 93306 TTE W/DOPPLER COMPLETE: CPT | Performed by: INTERNAL MEDICINE

## 2023-12-15 PROCEDURE — 86334 IMMUNOFIX E-PHORESIS SERUM: CPT

## 2023-12-15 PROCEDURE — 36415 COLL VENOUS BLD VENIPUNCTURE: CPT

## 2023-12-15 PROCEDURE — 80053 COMPREHEN METABOLIC PANEL: CPT

## 2023-12-18 LAB
ALBUMIN SERPL ELPH-MCNC: 4.41 G/DL (ref 3.75–5.21)
ALBUMIN/GLOB SERPL: 1.77 {RATIO} (ref 1–2)
ALPHA1 GLOB SERPL ELPH-MCNC: 0.23 G/DL (ref 0.19–0.46)
ALPHA2 GLOB SERPL ELPH-MCNC: 0.58 G/DL (ref 0.48–1.05)
B-GLOBULIN SERPL ELPH-MCNC: 0.61 G/DL (ref 0.68–1.23)
GAMMA GLOB SERPL ELPH-MCNC: 1.06 G/DL (ref 0.62–1.7)
KAPPA LC FREE SER-MCNC: 26.63 MG/DL (ref 0.33–1.94)
KAPPA LC FREE/LAMBDA FREE SER NEPH: 1.52 {RATIO} (ref 0.26–1.65)
LAMBDA LC FREE SERPL-MCNC: 17.55 MG/DL (ref 0.57–2.63)
PROT SERPL-MCNC: 6.9 G/DL (ref 5.7–8.2)

## 2023-12-22 RX ORDER — ROPINIROLE 1 MG/1
1 TABLET, FILM COATED ORAL NIGHTLY
Qty: 90 TABLET | Refills: 0 | Status: SHIPPED | OUTPATIENT
Start: 2023-12-22

## 2023-12-22 RX ORDER — FOLIC ACID 1 MG/1
1 TABLET ORAL DAILY
Qty: 90 TABLET | Refills: 0 | Status: SHIPPED | OUTPATIENT
Start: 2023-12-22

## 2023-12-22 NOTE — TELEPHONE ENCOUNTER
LOV 12/4/2023    Future Appointments   Date Time Provider James Jensen   1/24/2024 10:30 AM Reyna Gomez MD [facility] via[facility]ycle   5/8/2024 11:00 AM Anatoliy Lamas MD [facility] GABRIEL [address]

## 2023-12-27 RX ORDER — LENALIDOMIDE 5 MG/1
CAPSULE ORAL
Qty: 14 CAPSULE | Refills: 0 | Status: SHIPPED | OUTPATIENT
Start: 2023-12-27 | End: 2024-01-24

## 2023-12-28 ENCOUNTER — TELEPHONE (OUTPATIENT)
Dept: HEMATOLOGY/ONCOLOGY | Facility: HOSPITAL | Age: 83
End: 2023-12-28

## 2023-12-28 NOTE — TELEPHONE ENCOUNTER
[facility] calling regarding medication  Lenalidomide 5 MG Oral Cap     Authorization survey has .   Please call [facility]  [phone]    Then liseth call [facility]  with new authorization    [phone]

## 2024-01-07 ENCOUNTER — HOSPITAL ENCOUNTER (INPATIENT)
Facility: HOSPITAL | Age: 84
LOS: 1 days | Discharge: HOME | End: 2024-01-09
Attending: EMERGENCY MEDICINE | Admitting: HOSPITALIST
Payer: MEDICAID

## 2024-01-07 ENCOUNTER — APPOINTMENT (OUTPATIENT)
Dept: GENERAL RADIOLOGY | Facility: HOSPITAL | Age: 84
End: 2024-01-07
Attending: EMERGENCY MEDICINE
Payer: MEDICAID

## 2024-01-07 ENCOUNTER — PATIENT MESSAGE (OUTPATIENT)
Dept: FAMILY MEDICINE CLINIC | Facility: CLINIC | Age: 84
End: 2024-01-07

## 2024-01-07 ENCOUNTER — HOSPITAL ENCOUNTER (INPATIENT)
Facility: HOSPITAL | Age: 84
LOS: 1 days | Discharge: HOME HEALTH | End: 2024-01-09
Attending: EMERGENCY MEDICINE | Admitting: HOSPITALIST
Payer: MEDICAID

## 2024-01-07 DIAGNOSIS — U07.1 COVID-19 VIRUS INFECTION: Primary | ICD-10-CM

## 2024-01-07 DIAGNOSIS — Z99.2 ESRD (END STAGE RENAL DISEASE) ON DIALYSIS (HCC): ICD-10-CM

## 2024-01-07 DIAGNOSIS — D84.9 IMMUNOSUPPRESSED STATUS (HCC): ICD-10-CM

## 2024-01-07 DIAGNOSIS — N18.6 ESRD (END STAGE RENAL DISEASE) ON DIALYSIS (HCC): ICD-10-CM

## 2024-01-07 LAB
ALBUMIN SERPL-MCNC: 3.6 G/DL (ref 3.4–5)
ALBUMIN/GLOB SERPL: 1 {RATIO} (ref 1–2)
ALP LIVER SERPL-CCNC: 73 U/L
ALT SERPL-CCNC: 20 U/L
ANION GAP SERPL CALC-SCNC: 5 MMOL/L (ref 0–18)
AST SERPL-CCNC: 12 U/L (ref 15–37)
BASOPHILS # BLD AUTO: 0.02 X10(3) UL (ref 0–0.2)
BASOPHILS NFR BLD AUTO: 0.3 %
BILIRUB SERPL-MCNC: 0.4 MG/DL (ref 0.1–2)
BUN BLD-MCNC: 61 MG/DL (ref 9–23)
CALCIUM BLD-MCNC: 9 MG/DL (ref 8.5–10.1)
CHLORIDE SERPL-SCNC: 100 MMOL/L (ref 98–112)
CO2 SERPL-SCNC: 35 MMOL/L (ref 21–32)
CREAT BLD-MCNC: 7.42 MG/DL
EGFRCR SERPLBLD CKD-EPI 2021: 7 ML/MIN/1.73M2 (ref 60–?)
EOSINOPHIL # BLD AUTO: 0.07 X10(3) UL (ref 0–0.7)
EOSINOPHIL NFR BLD AUTO: 1.2 %
ERYTHROCYTE [DISTWIDTH] IN BLOOD BY AUTOMATED COUNT: 13.6 %
FLUAV + FLUBV RNA SPEC NAA+PROBE: NEGATIVE
FLUAV + FLUBV RNA SPEC NAA+PROBE: NEGATIVE
GLOBULIN PLAS-MCNC: 3.7 G/DL (ref 2.8–4.4)
GLUCOSE BLD-MCNC: 136 MG/DL (ref 70–99)
HCT VFR BLD AUTO: 28.4 %
HGB BLD-MCNC: 9.9 G/DL
IMM GRANULOCYTES # BLD AUTO: 0.02 X10(3) UL (ref 0–1)
IMM GRANULOCYTES NFR BLD: 0.3 %
LYMPHOCYTES # BLD AUTO: 1.26 X10(3) UL (ref 1–4)
LYMPHOCYTES NFR BLD AUTO: 21.3 %
MCH RBC QN AUTO: 35.5 PG (ref 26–34)
MCHC RBC AUTO-ENTMCNC: 34.9 G/DL (ref 31–37)
MCV RBC AUTO: 101.8 FL
MONOCYTES # BLD AUTO: 0.3 X10(3) UL (ref 0.1–1)
MONOCYTES NFR BLD AUTO: 5.1 %
NEUTROPHILS # BLD AUTO: 4.25 X10 (3) UL (ref 1.5–7.7)
NEUTROPHILS # BLD AUTO: 4.25 X10(3) UL (ref 1.5–7.7)
NEUTROPHILS NFR BLD AUTO: 71.8 %
OSMOLALITY SERPL CALC.SUM OF ELEC: 309 MOSM/KG (ref 275–295)
PLATELET # BLD AUTO: 99 10(3)UL (ref 150–450)
POTASSIUM SERPL-SCNC: 4.4 MMOL/L (ref 3.5–5.1)
PROT SERPL-MCNC: 7.3 G/DL (ref 6.4–8.2)
RBC # BLD AUTO: 2.79 X10(6)UL
RSV RNA SPEC NAA+PROBE: NEGATIVE
SARS-COV-2 RNA RESP QL NAA+PROBE: DETECTED
SODIUM SERPL-SCNC: 140 MMOL/L (ref 136–145)
WBC # BLD AUTO: 5.9 X10(3) UL (ref 4–11)

## 2024-01-07 PROCEDURE — 99223 1ST HOSP IP/OBS HIGH 75: CPT | Performed by: HOSPITALIST

## 2024-01-07 PROCEDURE — 71045 X-RAY EXAM CHEST 1 VIEW: CPT | Performed by: EMERGENCY MEDICINE

## 2024-01-07 PROCEDURE — XW033E5 INTRODUCTION OF REMDESIVIR ANTI-INFECTIVE INTO PERIPHERAL VEIN, PERCUTANEOUS APPROACH, NEW TECHNOLOGY GROUP 5: ICD-10-PCS | Performed by: EMERGENCY MEDICINE

## 2024-01-08 PROBLEM — D84.9 IMMUNOSUPPRESSED STATUS (HCC): Status: ACTIVE | Noted: 2024-01-08

## 2024-01-08 LAB
ALBUMIN SERPL-MCNC: 3 G/DL (ref 3.4–5)
ALBUMIN/GLOB SERPL: 1 {RATIO} (ref 1–2)
ALP LIVER SERPL-CCNC: 59 U/L
ALT SERPL-CCNC: 17 U/L
ANION GAP SERPL CALC-SCNC: 9 MMOL/L (ref 0–18)
AST SERPL-CCNC: 10 U/L (ref 15–37)
BASOPHILS # BLD AUTO: 0.02 X10(3) UL (ref 0–0.2)
BASOPHILS NFR BLD AUTO: 0.4 %
BILIRUB SERPL-MCNC: 0.4 MG/DL (ref 0.1–2)
BUN BLD-MCNC: 68 MG/DL (ref 9–23)
CALCIUM BLD-MCNC: 8.4 MG/DL (ref 8.5–10.1)
CHLORIDE SERPL-SCNC: 103 MMOL/L (ref 98–112)
CO2 SERPL-SCNC: 27 MMOL/L (ref 21–32)
CREAT BLD-MCNC: 8.2 MG/DL
EGFRCR SERPLBLD CKD-EPI 2021: 6 ML/MIN/1.73M2 (ref 60–?)
EOSINOPHIL # BLD AUTO: 0.1 X10(3) UL (ref 0–0.7)
EOSINOPHIL NFR BLD AUTO: 2.2 %
ERYTHROCYTE [DISTWIDTH] IN BLOOD BY AUTOMATED COUNT: 13.8 %
GLOBULIN PLAS-MCNC: 3.1 G/DL (ref 2.8–4.4)
GLUCOSE BLD-MCNC: 105 MG/DL (ref 70–99)
GLUCOSE BLD-MCNC: 121 MG/DL (ref 70–99)
GLUCOSE BLD-MCNC: 140 MG/DL (ref 70–99)
GLUCOSE BLD-MCNC: 144 MG/DL (ref 70–99)
GLUCOSE BLD-MCNC: 211 MG/DL (ref 70–99)
GLUCOSE BLD-MCNC: 95 MG/DL (ref 70–99)
GLUCOSE BLD-MCNC: 99 MG/DL (ref 70–99)
HCT VFR BLD AUTO: 25.4 %
HGB BLD-MCNC: 8.6 G/DL
IMM GRANULOCYTES # BLD AUTO: 0.01 X10(3) UL (ref 0–1)
IMM GRANULOCYTES NFR BLD: 0.2 %
LYMPHOCYTES # BLD AUTO: 0.92 X10(3) UL (ref 1–4)
LYMPHOCYTES NFR BLD AUTO: 19.9 %
MCH RBC QN AUTO: 35.1 PG (ref 26–34)
MCHC RBC AUTO-ENTMCNC: 33.9 G/DL (ref 31–37)
MCV RBC AUTO: 103.7 FL
MONOCYTES # BLD AUTO: 0.25 X10(3) UL (ref 0.1–1)
MONOCYTES NFR BLD AUTO: 5.4 %
NEUTROPHILS # BLD AUTO: 3.32 X10 (3) UL (ref 1.5–7.7)
NEUTROPHILS # BLD AUTO: 3.32 X10(3) UL (ref 1.5–7.7)
NEUTROPHILS NFR BLD AUTO: 71.9 %
OSMOLALITY SERPL CALC.SUM OF ELEC: 309 MOSM/KG (ref 275–295)
PLATELET # BLD AUTO: 83 10(3)UL (ref 150–450)
POTASSIUM SERPL-SCNC: 4.2 MMOL/L (ref 3.5–5.1)
PROCALCITONIN SERPL-MCNC: 1.15 NG/ML (ref ?–0.16)
PROT SERPL-MCNC: 6.1 G/DL (ref 6.4–8.2)
RBC # BLD AUTO: 2.45 X10(6)UL
SODIUM SERPL-SCNC: 139 MMOL/L (ref 136–145)
WBC # BLD AUTO: 4.6 X10(3) UL (ref 4–11)

## 2024-01-08 PROCEDURE — 99232 SBSQ HOSP IP/OBS MODERATE 35: CPT | Performed by: INTERNAL MEDICINE

## 2024-01-08 PROCEDURE — 99222 1ST HOSP IP/OBS MODERATE 55: CPT | Performed by: INTERNAL MEDICINE

## 2024-01-08 RX ORDER — METOCLOPRAMIDE HYDROCHLORIDE 5 MG/ML
5 INJECTION INTRAMUSCULAR; INTRAVENOUS EVERY 8 HOURS PRN
Status: DISCONTINUED | OUTPATIENT
Start: 2024-01-08 | End: 2024-01-09

## 2024-01-08 RX ORDER — ALBUMIN (HUMAN) 12.5 G/50ML
25 SOLUTION INTRAVENOUS
Status: ACTIVE | OUTPATIENT
Start: 2024-01-08 | End: 2024-01-09

## 2024-01-08 RX ORDER — CINACALCET 30 MG/1
30 TABLET, FILM COATED ORAL
Status: DISCONTINUED | OUTPATIENT
Start: 2024-01-08 | End: 2024-01-09

## 2024-01-08 RX ORDER — AMLODIPINE BESYLATE 5 MG/1
10 TABLET ORAL DAILY
Status: DISCONTINUED | OUTPATIENT
Start: 2024-01-08 | End: 2024-01-09

## 2024-01-08 RX ORDER — HYDROCODONE BITARTRATE AND ACETAMINOPHEN 10; 325 MG/1; MG/1
1 TABLET ORAL EVERY 8 HOURS PRN
Status: DISCONTINUED | OUTPATIENT
Start: 2024-01-08 | End: 2024-01-09

## 2024-01-08 RX ORDER — POLYETHYLENE GLYCOL 3350 17 G/17G
17 POWDER, FOR SOLUTION ORAL DAILY PRN
Status: DISCONTINUED | OUTPATIENT
Start: 2024-01-08 | End: 2024-01-09

## 2024-01-08 RX ORDER — ASPIRIN 81 MG/1
81 TABLET ORAL DAILY
Status: DISCONTINUED | OUTPATIENT
Start: 2024-01-08 | End: 2024-01-09

## 2024-01-08 RX ORDER — BISACODYL 10 MG
10 SUPPOSITORY, RECTAL RECTAL
Status: DISCONTINUED | OUTPATIENT
Start: 2024-01-08 | End: 2024-01-09

## 2024-01-08 RX ORDER — ROSUVASTATIN CALCIUM 10 MG/1
10 TABLET, COATED ORAL NIGHTLY
Status: DISCONTINUED | OUTPATIENT
Start: 2024-01-08 | End: 2024-01-09

## 2024-01-08 RX ORDER — ECHINACEA PURPUREA EXTRACT 125 MG
1 TABLET ORAL
Status: DISCONTINUED | OUTPATIENT
Start: 2024-01-08 | End: 2024-01-09

## 2024-01-08 RX ORDER — NICOTINE POLACRILEX 4 MG
15 LOZENGE BUCCAL
Status: DISCONTINUED | OUTPATIENT
Start: 2024-01-08 | End: 2024-01-09

## 2024-01-08 RX ORDER — SENNOSIDES 8.6 MG
17.2 TABLET ORAL NIGHTLY PRN
Status: DISCONTINUED | OUTPATIENT
Start: 2024-01-08 | End: 2024-01-09

## 2024-01-08 RX ORDER — HEPARIN SODIUM 5000 [USP'U]/ML
5000 INJECTION, SOLUTION INTRAVENOUS; SUBCUTANEOUS EVERY 8 HOURS SCHEDULED
Status: DISCONTINUED | OUTPATIENT
Start: 2024-01-08 | End: 2024-01-09

## 2024-01-08 RX ORDER — DOCUSATE SODIUM 100 MG/1
100 CAPSULE, LIQUID FILLED ORAL 2 TIMES DAILY
Status: DISCONTINUED | OUTPATIENT
Start: 2024-01-08 | End: 2024-01-09

## 2024-01-08 RX ORDER — ACETAMINOPHEN 500 MG
1000 TABLET ORAL EVERY 4 HOURS PRN
Status: DISCONTINUED | OUTPATIENT
Start: 2024-01-08 | End: 2024-01-09

## 2024-01-08 RX ORDER — MELATONIN
3 NIGHTLY PRN
Status: DISCONTINUED | OUTPATIENT
Start: 2024-01-08 | End: 2024-01-09

## 2024-01-08 RX ORDER — ESCITALOPRAM OXALATE 20 MG/1
20 TABLET ORAL EVERY MORNING
Status: DISCONTINUED | OUTPATIENT
Start: 2024-01-08 | End: 2024-01-09

## 2024-01-08 RX ORDER — ONDANSETRON 2 MG/ML
4 INJECTION INTRAMUSCULAR; INTRAVENOUS EVERY 6 HOURS PRN
Status: DISCONTINUED | OUTPATIENT
Start: 2024-01-08 | End: 2024-01-09

## 2024-01-08 RX ORDER — FOLIC ACID 1 MG/1
1 TABLET ORAL DAILY
Status: DISCONTINUED | OUTPATIENT
Start: 2024-01-08 | End: 2024-01-09

## 2024-01-08 RX ORDER — ROPINIROLE 1 MG/1
1 TABLET, FILM COATED ORAL NIGHTLY
Status: DISCONTINUED | OUTPATIENT
Start: 2024-01-08 | End: 2024-01-09

## 2024-01-08 RX ORDER — BENZONATATE 200 MG/1
200 CAPSULE ORAL 3 TIMES DAILY PRN
Status: DISCONTINUED | OUTPATIENT
Start: 2024-01-08 | End: 2024-01-09

## 2024-01-08 RX ORDER — LATANOPROST 50 UG/ML
1 SOLUTION/ DROPS OPHTHALMIC DAILY
Status: DISCONTINUED | OUTPATIENT
Start: 2024-01-08 | End: 2024-01-09

## 2024-01-08 RX ORDER — DEXTROSE MONOHYDRATE 25 G/50ML
50 INJECTION, SOLUTION INTRAVENOUS
Status: DISCONTINUED | OUTPATIENT
Start: 2024-01-08 | End: 2024-01-09

## 2024-01-08 RX ORDER — GUAIFENESIN 600 MG/1
600 TABLET, EXTENDED RELEASE ORAL 2 TIMES DAILY
Status: DISCONTINUED | OUTPATIENT
Start: 2024-01-08 | End: 2024-01-09

## 2024-01-08 RX ORDER — NICOTINE POLACRILEX 4 MG
30 LOZENGE BUCCAL
Status: DISCONTINUED | OUTPATIENT
Start: 2024-01-08 | End: 2024-01-09

## 2024-01-08 RX ORDER — HYDRALAZINE HYDROCHLORIDE 10 MG/1
10 TABLET, FILM COATED ORAL 3 TIMES DAILY PRN
Status: DISCONTINUED | OUTPATIENT
Start: 2024-01-08 | End: 2024-01-09

## 2024-01-08 RX ORDER — PREGABALIN 25 MG/1
25 CAPSULE ORAL 3 TIMES DAILY
Status: DISCONTINUED | OUTPATIENT
Start: 2024-01-08 | End: 2024-01-09

## 2024-01-08 RX ORDER — CALCIUM ACETATE 667 MG/1
2 CAPSULE ORAL 3 TIMES DAILY
Status: DISCONTINUED | OUTPATIENT
Start: 2024-01-08 | End: 2024-01-09

## 2024-01-08 RX ORDER — LOSARTAN POTASSIUM 100 MG/1
100 TABLET ORAL DAILY
Status: DISCONTINUED | OUTPATIENT
Start: 2024-01-08 | End: 2024-01-09

## 2024-01-08 NOTE — CONSULTS
INFECTIOUS DISEASE CONSULTATION    Sarika Rob Patient Status:  Inpatient    10/3/1940 MRN [id number]   Location [facility] 5NW-A Attending Parisa Vargas, DO   Hosp Day # 0 PCP Baltazar Baker MD       Requested by Dr. Quiles    Reason for Consultation:  COVID    History of Present Illness:  Sarika Rob is a a(n) 83 year old male, last recorded covid vaccination 2021 ( > 2 years ago with original variant), and was not vaccinated with this  years vaccine.  He presented to ED on  co cough, body aches, starting .  He has underlying ESRD,  multiple myeloma.  Low grade  temp 99.2 in ED, no sob or hypoxia      History:  Past Medical History:   Diagnosis Date    Chronic kidney disease     stage 5    Diabetes (HCC)     ESRD (end stage renal disease) (HCC)     Essential hypertension     Glaucoma     High blood pressure     High cholesterol     History of blood transfusion     Multiple myeloma (HCC)     Myeloma (HCC)     Peripheral vascular disease (HCC)     Renal disorder      Past Surgical History:   Procedure Laterality Date    APPENDECTOMY      AV FISTULA REVISION, OPEN      OTHER      dialysis     Family History   Problem Relation Age of Onset    Hypertension Father     Diabetes Father     Stroke Mother     Heart Disorder Mother       reports that he has never smoked. He has never used smokeless tobacco. He reports that he does not drink alcohol and does not use drugs.      Allergies:  No Known Allergies    Medications:    Current Facility-Administered Medications:     amLODIPine (Norvasc) tab 10 mg, 10 mg, Oral, Daily    aspirin DR tab 81 mg, 81 mg, Oral, Daily    calcium acetate (Phoslo) cap 1,334 mg, 2 capsule, Oral, TID    cinacalcet (Sensipar) tab 30 mg, 30 mg, Oral, Daily with breakfast    docusate sodium (Colace) cap 100 mg, 100 mg, Oral, BID    escitalopram (Lexapro) tab 20 mg, 20 mg, Oral, QAM    folic acid (Folvite) tab 1 mg, 1 mg, Oral,  Daily    hydrALAZINE (Apresoline) tab 10 mg, 10 mg, Oral, TID PRN    HYDROcodone-acetaminophen (Norco)  MG per tab 1 tablet, 1 tablet, Oral, Q8H PRN    latanoprost (Xalatan) 0.005 % ophthalmic solution 1 drop, 1 drop, Ophthalmic, Daily    losartan (Cozaar) tab 100 mg, 100 mg, Oral, Daily    melatonin cap/tab 5 mg, 5 mg, Oral, Nightly    pregabalin (Lyrica) cap 25 mg, 25 mg, Oral, TID    rOPINIRole (Requip) tab 1 mg, 1 mg, Oral, Nightly    rosuvastatin (Crestor) tab 10 mg, 10 mg, Oral, Nightly    insulin aspart (NovoLOG) 100 Units/mL FlexPen 1-68 Units, 1-68 Units, Subcutaneous, TID CC    glucose (Dex4) 15 GM/59ML oral liquid 15 g, 15 g, Oral, Q15 Min PRN **OR** glucose (Glutose) 40% oral gel 15 g, 15 g, Oral, Q15 Min PRN **OR** glucose-vitamin C (Dex-4) chewable tab 4 tablet, 4 tablet, Oral, Q15 Min PRN **OR** dextrose 50% injection 50 mL, 50 mL, Intravenous, Q15 Min PRN **OR** glucose (Dex4) 15 GM/59ML oral liquid 30 g, 30 g, Oral, Q15 Min PRN **OR** glucose (Glutose) 40% oral gel 30 g, 30 g, Oral, Q15 Min PRN **OR** glucose-vitamin C (Dex-4) chewable tab 8 tablet, 8 tablet, Oral, Q15 Min PRN    acetaminophen (Tylenol Extra Strength) tab 1,000 mg, 1,000 mg, Oral, Q4H PRN    melatonin tab 3 mg, 3 mg, Oral, Nightly PRN    polyethylene glycol (PEG 3350) (Miralax) 17 g oral packet 17 g, 17 g, Oral, Daily PRN    sennosides (Senokot) tab 17.2 mg, 17.2 mg, Oral, Nightly PRN    bisacodyl (Dulcolax) 10 MG rectal suppository 10 mg, 10 mg, Rectal, Daily PRN    ondansetron (Zofran) 4 MG/2ML injection 4 mg, 4 mg, Intravenous, Q6H PRN    metoclopramide (Reglan) 5 mg/mL injection 5 mg, 5 mg, Intravenous, Q8H PRN    benzonatate (Tessalon) cap 200 mg, 200 mg, Oral, TID PRN    heparin (Porcine) 5000 UNIT/ML injection 5,000 Units, 5,000 Units, Subcutaneous, Q8H JESSI    insulin aspart (NovoLOG) 100 Units/mL FlexPen 1-10 Units, 1-10 Units, Subcutaneous, TID AC and HS    insulin detemir (Levemir) 100 UNIT/ML FlexPen/FlexTouch 3  Units, 3 Units, Subcutaneous, BID    azithromycin (Zithromax) 500 mg in sodium chloride 0.9% 250mL IVPB premix, 500 mg, Intravenous, Q24H    cefTRIAXone (Rocephin) 1 g in D5W 100 mL IVPB-ADD, 1 g, Intravenous, Q24H    guaiFENesin ER (Mucinex) 12 hr tab 600 mg, 600 mg, Oral, BID    sodium chloride (Saline Mist) 0.65 % nasal solution 1 spray, 1 spray, Each Nare, Q3H PRN    glycerin-hypromellose- (Artifical Tears) 0.2-0.2-1 % ophthalmic solution 1 drop, 1 drop, Both Eyes, QID PRN    [START ON 1/9/2024] epoetin armando (Epogen, Procrit) 73653 UNIT/ML injection 10,000 Units, 10,000 Units, Intravenous, Once in dialysis    sodium chloride 0.9 % IV bolus 100 mL, 100 mL, Intravenous, Q30 Min PRN **AND** albumin human (Albumin) 25% injection 25 g, 25 g, Intravenous, PRN Dialysis    remdesivir (Veklury) 100 mg in sodium chloride 0.9% 270 mL IVPB, 100 mg, Intravenous, Q24H  Current Facility-Administered Medications on File Prior to Encounter   Medication Dose Route Frequency Provider Last Rate Last Admin    [COMPLETED] patiromer (Veltassa) 8.4 g oral packet 8.4 g  8.4 g Oral Once Darrell Silva MD   8.4 g at 11/27/23 1612    [COMPLETED] lidocaine (Xylocaine) 1 % injection             [COMPLETED] fentaNYL (Sublimaze) 50 mcg/mL injection             [COMPLETED] midazolam (Versed) 2 MG/2ML injection             [COMPLETED] heparin (Porcine) 5000 UNIT/ML injection             [COMPLETED] sterile water for injection (PF) injection             [COMPLETED] alteplase (Activase) 2 mg injection             [COMPLETED] heparin (Porcine) 5000 UNIT/ML injection             [COMPLETED] iodixanol (VISIPAQUE) 320 MG/ML injection 50 mL  50 mL Injection ONCE PRN Cora Cardoso MD   40 mL at 11/27/23 1507    [COMPLETED] lidocaine (Xylocaine) 1 % injection             [COMPLETED] heparin (Porcine) 5000 UNIT/ML injection             [COMPLETED] heparin (Porcine) 1000 UNIT/ML injection 1,500 Units  1.5 mL Intracatheter Once Darrell Silva MD    1,500 Units at 11/25/23 0851     Current Outpatient Medications on File Prior to Encounter   Medication Sig Dispense Refill    Lenalidomide 5 MG Oral Cap TAKE 1 CAPSULE DAILY FOR 14 DAYS OF A 28 DAY CYCLE (Patient taking differently: Take 1 tablet by mouth daily. TAKE 1 CAPSULE DAILY FOR 14 DAYS OF A 28 DAY CYCLE (started 12/29/2023)) 14 capsule 0    folic acid 1 MG Oral Tab Take 1 tablet (1 mg total) by mouth daily. 90 tablet 0    rOPINIRole 1 MG Oral Tab Take 1 tablet (1 mg total) by mouth nightly. 90 tablet 0    hydrALAZINE 10 MG Oral Tab Take 1 tablet (10 mg total) by mouth 3 (three) times daily as needed (if  or grater). Give 10-20 mg  Give 50 mg if  or above      Melatonin 5 MG Oral Tab Take 1 tablet (5 mg total) by mouth nightly.      pregabalin 25 MG Oral Cap Take 1 capsule (25 mg total) by mouth 3 (three) times daily. 90 capsule 2    Insulin Lispro Prot & Lispro (HUMALOG MIX 75/25 KWIKPEN) (75-25) 100 UNIT/ML SC SUPN Inject 20 Units into the skin in the morning and 20 Units before bedtime. (Patient taking differently: Inject 5 Units into the skin in the morning and 5 Units before bedtime. Per sliding scale .) 5 each 1    amLODIPine 10 MG Oral Tab Take 1 tablet (10 mg total) by mouth daily. 90 tablet 1    losartan 100 MG Oral Tab Take 1 tablet (100 mg total) by mouth daily. 90 tablet 1    rosuvastatin 10 MG Oral Tab Take 1 tablet (10 mg total) by mouth nightly. 90 tablet 1    albuterol 108 (90 Base) MCG/ACT Inhalation Aero Soln Inhale 2 puffs into the lungs every 6 (six) hours as needed for Wheezing. 1 each 0    escitalopram 20 MG Oral Tab 20 mg. 1 tablet 90 tablet 1    calcium acetate 667 MG Oral Cap Take 2 capsules (1,334 mg total) by mouth 3 (three) times daily. 180 capsule 0    HYDROcodone-acetaminophen (NORCO)  MG Oral Tab Take 1 tablet by mouth every 8 (eight) hours as needed for Pain. 30 tablet 0    LATANOPROST OP Apply 1 drop to eye at bedtime. One drop to each eye nightly       cinacalcet 30 MG Oral Tab Take 1 tablet (30 mg total) by mouth daily with breakfast. T, Th, Sat      BABY ASPIRIN OR Take 81 mg by mouth daily.      docusate sodium 100 MG Oral Cap 1 capsule (100 mg total) 2 (two) times daily. PRN      Insulin Pen Needle (TRUEPLUS PEN NEEDLES) 32G X 4 MM Does not apply Misc 1 each by other route in the morning and 1 each before bedtime 200 each 3    Continuous Blood Gluc Sensor (DEXCOM G7 SENSOR) Does not apply Misc 1 each Every 10 days. 10 each 1    Glucose Blood (ONETOUCH VERIO) In Vitro Strip 1 each by Other route daily. 100 strip 1    Continuous Blood Gluc Transmit (DEXCOM G6 TRANSMITTER) Does not apply Misc 1 each As Directed. Use with Dexcom G6 sensor. 1 every 90 days. 1 each 0    Insulin Pen Needle (TRUEPLUS 5-BEVEL PEN NEEDLES) 32G X 4 MM Does not apply Misc Test 2 times daily. 200 each 3    OneTouch Delica Lancets 33G Does not apply Misc Test once daily 100 each 3    Blood Glucose Monitoring Suppl (ONETOUCH VERIO) w/Device Does not apply Kit 1 kit by Does not apply route as needed. 1 kit 0       Review of Systems:    A comprehensive 10 point review of systems was completed.  Pertinent positives and negatives noted in the the HPI.      Physical Exam:    General: No acute distress. Alert and oriented x 3.  Vital signs: Temp:  [97.8 °F (36.6 °C)-99.2 °F (37.3 °C)] 97.8 °F (36.6 °C)  Pulse:  [62-75] 62  Resp:  [16-19] 18  BP: (115-137)/(55-80) 123/59  SpO2:  [94 %-98 %] 96 %  Body mass index is 24.29 kg/m².  HEENT: Moist mucous membranes. Extraocular muscles are intact.  Neck: No swelling, no masses  Respiratory: Non labored, symmetric exursion  Cardiovascular: No irregularities in rhythm  Abdomen: Soft, nontender, nondistended.    Musculoskeletal: Full range of motion of all extremities.  No swelling noted.  Joints: no effusions  Skin: No lesions. No erythema, no open wounds    Laboratory Data:  Laboratory data reviewed      Recent Labs   Lab 01/07/24  2208   RBC 2.79*   HGB  9.9*   HCT 28.4*   .8*   MCH 35.5*   MCHC 34.9   RDW 13.6   NEPRELIM 4.25   WBC 5.9   PLT 99.0*       Recent Labs   Lab 01/07/24 2208   *   BUN 61*   CREATSERUM 7.42*   CA 9.0   ALB 3.6      K 4.4      CO2 35.0*   ALKPHO 73   AST 12*   ALT 20   BILT 0.4   TP 7.3         Lab Results   Component Value Date    PGLU 105 01/08/2024     COVID-19 Lab Results    COVID-19  Lab Results   Component Value Date    COVID19 Detected (A) 01/07/2024    COVID19 Not Detected 10/17/2023    COVID19 Not Detected 12/18/2022       Pro-Calcitonin  Recent Labs   Lab 01/07/24 2208   PCT 1.15*       Cardiac  No results for input(s): \"TROP\", \"PBNP\" in the last 168 hours.    Creatinine Kinase  No results for input(s): \"CK\" in the last 168 hours.    Inflammatory Markers  No results for input(s): \"CRP\", \"BRIAN\", \"LDH\", \"DDIMER\" in the last 168 hours.    Established Problem list:  Patient Active Problem List   Diagnosis    Type 2 diabetes mellitus with chronic kidney disease on chronic dialysis, with long-term current use of insulin ([facility])    Type 2 diabetes mellitus with retinopathy, with long-term current use of insulin, macular edema presence unspecified, unspecified laterality, unspecified retinopathy severity (HCC)    Type 2 diabetes mellitus with polyneuropathy ([facility])    Essential hypertension    Multiple myeloma (HCC)    LBBB (left bundle branch block)    CKD (chronic kidney disease) requiring chronic dialysis (HCC)    Chronic kidney failure    Orthostatic hypotension    Thrombocytopenia (HCC)    Anemia    Acute renal failure (ARF) (HCC)    Acute kidney injury (HCC)    ESRD (end stage renal disease) on dialysis ([facility])    Fever, unspecified fever cause    ESRD (end stage renal disease) ([facility])    ESRD on hemodialysis (HCC)    Allergy, unspecified, initial encounter    Anaphylactic shock, unspecified, initial encounter    Major depressive disorder, recurrent, 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[facility])    Normochromic anemia    Long-term current use of  lenalidomide    Other polyneuropathy    COVID    COVID-19    Hyperkalemia    Parkinson's disease    Anemia in ESRD (end-stage renal disease)  (HCC)    Encounter for blood transfusion    Pain, unspecified    Fluid overload, unspecified    Cellulitis of right upper extremity    Vesicular skin lesions    Hyperglycemia    Shortness of breath    CKD (chronic kidney disease) stage 1, GFR 90 ml/min or greater    Multiple myeloma not having achieved remission (HCC)    Alzheimer's disease, unspecified (HCC)    Complication of arteriovenous dialysis fistula, initial encounter    Anemia due to end stage renal disease  (HCC)    Chemotherapy management, encounter for    Hypercholesteremia    COVID-19 virus infection    Immunosuppressed status ([facility])       ASSESSMENT/PLAN:  1. COVID  -last recorded vaccination > 2 years ago with original vaccine, no record of vaccination with this years components   -underlying ESRD and myeloma (was on oral chemo)    Symptom onset 1 -2 days prior to admission  Mild/moderate symptoms    Was started on Remdesivir for mild/moderate symptoms and risk factors for complications and not taking paxlovid due to ESRD    Option to complete 3 days of Remdesivir or if otherwise ready for dc can complete 5 days of molnupirivir            Gerardo Cox MD, MD [facility]  [phone]

## 2024-01-08 NOTE — CONSULTS
[facility]  Report of Consultation    Sarika Rob Patient Status:  Inpatient    10/3/1940 MRN [id number]   Location [facility] 5NW-A Attending Parisa Vargas,    Hosp Day # 0 PCP Baltazar Baker MD     Reason for Consultation:  ESRD on hemodialysis/cough    History of Present Illness:  Sarika Rob is a a(n) 83 year old man well-known to our service with multiple medical problems including history of end-stage renal disease due to diabetes and multiple myeloma currently on hemodialysis Tuesday/Thursday/Saturday at [facility] who presented to the emergency department yesterday with complaints of shortness of breath and fatigue.  Workup suggestive of left lower lobe pneumonia in the setting of COVID and he was admitted for further evaluation and management.  This morning he reports that he is feeling significantly better      History:  Past Medical History:   Diagnosis Date    Chronic kidney disease     stage 5    Diabetes (HCC)     ESRD (end stage renal disease) (HCC)     Essential hypertension     Glaucoma     High blood pressure     High cholesterol     History of blood transfusion     Multiple myeloma (HCC)     Myeloma (HCC)     Peripheral vascular disease (HCC)     Renal disorder      Past Surgical History:   Procedure Laterality Date    APPENDECTOMY      AV FISTULA REVISION, OPEN      OTHER      dialysis     Family History   Problem Relation Age of Onset    Hypertension Father     Diabetes Father     Stroke Mother     Heart Disorder Mother       reports that he has never smoked. He has never used smokeless tobacco. He reports that he does not drink alcohol and does not use drugs.    Allergies:  No Known Allergies    Medications:    Current Facility-Administered Medications:     amLODIPine (Norvasc) tab 10 mg, 10 mg, Oral, Daily    aspirin DR tab 81 mg, 81 mg, Oral, Daily    calcium acetate (Phoslo) cap 1,334 mg, 2 capsule, Oral, TID    cinacalcet (Sensipar) tab 30 mg, 30 mg, Oral, Daily with  breakfast    docusate sodium (Colace) cap 100 mg, 100 mg, Oral, BID    escitalopram (Lexapro) tab 20 mg, 20 mg, Oral, QAM    folic acid (Folvite) tab 1 mg, 1 mg, Oral, Daily    hydrALAZINE (Apresoline) tab 10 mg, 10 mg, Oral, TID PRN    HYDROcodone-acetaminophen (Norco)  MG per tab 1 tablet, 1 tablet, Oral, Q8H PRN    latanoprost (Xalatan) 0.005 % ophthalmic solution 1 drop, 1 drop, Ophthalmic, Daily    losartan (Cozaar) tab 100 mg, 100 mg, Oral, Daily    melatonin cap/tab 5 mg, 5 mg, Oral, Nightly    pregabalin (Lyrica) cap 25 mg, 25 mg, Oral, TID    rOPINIRole (Requip) tab 1 mg, 1 mg, Oral, Nightly    rosuvastatin (Crestor) tab 10 mg, 10 mg, Oral, Nightly    insulin aspart (NovoLOG) 100 Units/mL FlexPen 1-68 Units, 1-68 Units, Subcutaneous, TID CC    glucose (Dex4) 15 GM/59ML oral liquid 15 g, 15 g, Oral, Q15 Min PRN **OR** glucose (Glutose) 40% oral gel 15 g, 15 g, Oral, Q15 Min PRN **OR** glucose-vitamin C (Dex-4) chewable tab 4 tablet, 4 tablet, Oral, Q15 Min PRN **OR** dextrose 50% injection 50 mL, 50 mL, Intravenous, Q15 Min PRN **OR** glucose (Dex4) 15 GM/59ML oral liquid 30 g, 30 g, Oral, Q15 Min PRN **OR** glucose (Glutose) 40% oral gel 30 g, 30 g, Oral, Q15 Min PRN **OR** glucose-vitamin C (Dex-4) chewable tab 8 tablet, 8 tablet, Oral, Q15 Min PRN    acetaminophen (Tylenol Extra Strength) tab 1,000 mg, 1,000 mg, Oral, Q4H PRN    melatonin tab 3 mg, 3 mg, Oral, Nightly PRN    polyethylene glycol (PEG 3350) (Miralax) 17 g oral packet 17 g, 17 g, Oral, Daily PRN    sennosides (Senokot) tab 17.2 mg, 17.2 mg, Oral, Nightly PRN    bisacodyl (Dulcolax) 10 MG rectal suppository 10 mg, 10 mg, Rectal, Daily PRN    ondansetron (Zofran) 4 MG/2ML injection 4 mg, 4 mg, Intravenous, Q6H PRN    metoclopramide (Reglan) 5 mg/mL injection 5 mg, 5 mg, Intravenous, Q8H PRN    benzonatate (Tessalon) cap 200 mg, 200 mg, Oral, TID PRN    heparin (Porcine) 5000 UNIT/ML injection 5,000 Units, 5,000 Units, Subcutaneous, Q8H  JESSI    insulin aspart (NovoLOG) 100 Units/mL FlexPen 1-10 Units, 1-10 Units, Subcutaneous, TID AC and HS    insulin detemir (Levemir) 100 UNIT/ML FlexPen/FlexTouch 3 Units, 3 Units, Subcutaneous, BID    azithromycin (Zithromax) 500 mg in sodium chloride 0.9% 250mL IVPB premix, 500 mg, Intravenous, Q24H    cefTRIAXone (Rocephin) 1 g in D5W 100 mL IVPB-ADD, 1 g, Intravenous, Q24H    guaiFENesin ER (Mucinex) 12 hr tab 600 mg, 600 mg, Oral, BID    sodium chloride (Saline Mist) 0.65 % nasal solution 1 spray, 1 spray, Each Nare, Q3H PRN    glycerin-hypromellose- (Artifical Tears) 0.2-0.2-1 % ophthalmic solution 1 drop, 1 drop, Both Eyes, QID PRN    remdesivir (Veklury) 100 mg in sodium chloride 0.9% 270 mL IVPB, 100 mg, Intravenous, Q24H  No current outpatient medications on file.       Review of Systems:  Denies fever/chills  Denies wt loss/gain  Denies HA or visual changes  Denies CP or palpitations  + SOB/coug  Denies abd or flank pain  Denies N/V/D  Denies change in urinary habits or gross hematuria  Denies LE edema  Denies skin rashes/myalgias/arthralgias      Physical Exam:   /59 (BP Location: Left arm)   Pulse 62   Temp 97.8 °F (36.6 °C) (Oral)   Resp 18   Ht 5' 9\" (1.753 m)   Wt 164 lb 8 oz (74.6 kg)   SpO2 96%   BMI 24.29 kg/m²   Temp (24hrs), Av.2 °F (36.8 °C), Min:97.8 °F (36.6 °C), Max:99.2 °F (37.3 °C)     No intake or output data in the 24 hours ending 24  Last 3 Weights   24 0128 164 lb 8 oz (74.6 kg)   24 2055 167 lb 8.8 oz (76 kg)   23 1821 160 lb (72.6 kg)   23 0612 158 lb 9.6 oz (71.9 kg)   23 0537 158 lb 11.2 oz (72 kg)   23 1643 161 lb 3.2 oz (73.1 kg)   23 1210 167 lb 8.8 oz (76 kg)   10/25/23 1205 163 lb (73.9 kg)   23 1244 162 lb (73.5 kg)     General: Alert and oriented in no apparent distress.  HEENT: No scleral icterus, MMM  Neck: Supple, no LACEY or thyromegaly  Cardiac: Regular rate and rhythm, S1, S2 normal, no  murmur or rub  Lungs: Clear without wheezes, rales, rhonchi.    Abdomen: Soft, non-tender. + bowel sounds, no palpable organomegaly  Extremities: Without clubbing, cyanosis or edema.  AV fistula with bruit and thrill  Neurologic: Alert and oriented, cranial nerves grossly intact, moving all extremities  Skin: Warm and dry, no rashes      Laboratory Data:  Lab Results   Component Value Date    WBC 5.9 01/07/2024    HGB 9.9 01/07/2024    HCT 28.4 01/07/2024    PLT 99.0 01/07/2024    CREATSERUM 7.42 01/07/2024    BUN 61 01/07/2024     01/07/2024    K 4.4 01/07/2024     01/07/2024    CO2 35.0 01/07/2024     01/07/2024    CA 9.0 01/07/2024    ALB 3.6 01/07/2024    ALKPHO 73 01/07/2024    BILT 0.4 01/07/2024    TP 7.3 01/07/2024    AST 12 01/07/2024    ALT 20 01/07/2024    PGLU 105 01/08/2024       BUN (mg/dL)   Date Value   01/07/2024 61 (H)   12/15/2023 38 (H)   11/28/2023 111 (H)     Blood Urea Nitrogen (mg/dL)   Date Value   02/23/2022 44.0 (H)   01/21/2022 33.0 (H)   12/01/2021 42.0 (H)     UREA NITROGEN (BUN) (mg/dL)   Date Value   06/15/2022 44 (H)     CREATININE (mg/dL)   Date Value   06/15/2022 6.59 (H)     Creatinine (mg/dL)   Date Value   01/07/2024 7.42 (H)   12/15/2023 6.57 (H)   11/28/2023 11.90 (H)   02/23/2022 6.51 (H)   01/21/2022 5.17 (H)   12/01/2021 6.15 (H)       No results found for: \"MICROALBCREA\"    Recent Labs   Lab 01/07/24  2208   WBC 5.9   HGB 9.9*   .8*   PLT 99.0*       Recent Labs   Lab 01/07/24 2208      K 4.4      CO2 35.0*   BUN 61*   CREATSERUM 7.42*   CA 9.0   *       Recent Labs   Lab 01/07/24 2208   ALT 20   AST 12*   ALB 3.6       Recent Labs   Lab 01/08/24  0306 01/08/24  0557 01/08/24  0837   PGLU 211* 140* 105*           Imaging:  Chest x-ray reviewed    Impression/Plan:    #1.  Shortness of breath/cough-noted to be COVID-positive in the emergency department also with abnormal chest x-ray suggestive of left lower lobe infiltrate.   Placed on empiric antibiotics as well as remdesivir.  Infectious disease is to evaluate    #2.  ESRD-due to hypertension as well as a history of multiple myeloma.  Dialysis to continue Tuesday/Thursday/Saturday per usual routine    #3.  Anemia-in the setting of multiple myeloma as well as ESRD.  Continue JOSE    #4.  Multiple myeloma-he has been on chemotherapy and follows with hematology    #5.  Hypertension-blood pressure is stable and he will continue his current antihypertensive regimen consisting of amlodipine, losartan    Thank you for allowing me to participate in the care of your patient. Please do not hesitate to call with any questions or concerns.       Luis Angel Menon MD  1/8/2024  9:28 AM

## 2024-01-08 NOTE — PROGRESS NOTES
[facility] Pharmacy Note:  Renal Dose Adjustment for Metoclopramide (REGLAN)    Sarika Rob has been prescribed Metoclopramide (REGLAN) 10 mg every 8 hours as needed for nausea/vomiting,.    Estimated Creatinine Clearance: 7.7 mL/min (A) (based on SCr of 7.42 mg/dL (H)).    Calculated creatinine clearance is < 40 ml/min, therefore, the dose of Metoclopramide (REGLAN) has been changed to 5 mg every 8 hours as needed for nausea/vomiting per P&T approved protocol.  Pharmacy will continue to follow, and if renal function improves, will resume the original order.       Thank you,  Darrell Salcido, [facility]  1/8/2024 1:48 AM

## 2024-01-08 NOTE — PROGRESS NOTES
NURSING ADMISSION NOTE      Patient  received from the E.D via  Cart and admitted to room 523.  Dx:  COVID.  He is A&Ox3-4. Slow to respond and his voice is very soft.   He is accompanied by his daughter, Elida who reports that patient is able to ambulate at home but he has had a recent fall. She believes he is weaker the past few days.   He is saturating above above 90% on room air.  No cough, no fever.  +right upper arm fistula .  Precaution in place.    Admission navigator completed with Elida providing most of the information needed.    Oriented to room.  Safety precautions initiated.  Bed in low position.  Call light in reach.

## 2024-01-08 NOTE — ED PROVIDER NOTES
Patient Seen in: [facility] Emergency Department      History     Chief Complaint   Patient presents with    Cough/URI     Body aches, cold like symptoms.      Stated Complaint: body aches, cold like symptoms    Subjective:   HPI    83-year-old male with a past medical history as below including ESRD on HD, multiple myeloma, hypertension and diabetes who presents with flulike symptoms starting yesterday.  He reports runny nose, cough, body aches and fatigue.  Patient reports feeling mildly short of breath.  He denies chest pain.  Denies vomiting or diarrhea.  Last dialysis yesterday.  Patient has been fully vaccinated for COVID.  Daughter states he had COVID last year and needed hospitalization.    Objective:   Past Medical History:   Diagnosis Date    Chronic kidney disease     stage 5    Diabetes (HCC)     ESRD (end stage renal disease) (HCC)     Essential hypertension     Glaucoma     High blood pressure     High cholesterol     History of blood transfusion     Multiple myeloma (HCC)     Myeloma (HCC)     Peripheral vascular disease (HCC)               Past Surgical History:   Procedure Laterality Date    APPENDECTOMY      AV FISTULA REVISION, OPEN      OTHER      dialysis                Social History     Socioeconomic History    Marital status:    Tobacco Use    Smoking status: Never    Smokeless tobacco: Never   Vaping Use    Vaping Use: Never used   Substance and Sexual Activity    Alcohol use: Never    Drug use: Never   Other Topics Concern    Caffeine Concern No    Exercise No    Seat Belt Yes     Social Determinants of Health     Financial Resource Strain: Low Risk  (11/29/2023)    Financial Resource Strain     Difficulty of Paying Living Expenses: Not hard at all     Med Affordability: No   Food Insecurity: No Food Insecurity (11/24/2023)    Food Insecurity     Food Insecurity: Never true   Transportation Needs: No Transportation Needs (11/24/2023)    Transportation Needs     Lack of  Transportation: No   Housing Stability: Low Risk  (11/24/2023)    Housing Stability     Housing Instability: No              Review of Systems   Constitutional:  Positive for chills and fatigue.       Positive for stated complaint: body aches, cold like symptoms  Other systems are as noted in HPI.  Constitutional and vital signs reviewed.      All other systems reviewed and negative except as noted above.    Physical Exam     ED Triage Vitals [01/07/24 2055]   /65   Pulse 75   Resp 18   Temp 99.2 °F (37.3 °C)   Temp src Temporal   SpO2 94 %   O2 Device None (Room air)       Current:/57   Pulse 66   Temp 99.2 °F (37.3 °C) (Temporal)   Resp 19   Ht 175.3 cm (5' 9\")   Wt 76 kg   SpO2 97%   BMI 24.74 kg/m²         Physical Exam  Vitals and nursing note reviewed.   Constitutional:       General: He is not in acute distress.     Appearance: He is well-developed. He is not ill-appearing.   HENT:      Head: Normocephalic and atraumatic.      Mouth/Throat:      Mouth: Mucous membranes are moist.   Eyes:      General: No scleral icterus.     Extraocular Movements: Extraocular movements intact.   Cardiovascular:      Rate and Rhythm: Normal rate and regular rhythm.   Pulmonary:      Effort: Pulmonary effort is normal.      Breath sounds: Normal breath sounds.   Abdominal:      Palpations: Abdomen is soft.      Tenderness: There is no abdominal tenderness.   Musculoskeletal:      Cervical back: Neck supple.   Skin:     General: Skin is warm and dry.      Capillary Refill: Capillary refill takes less than 2 seconds.   Neurological:      Mental Status: He is alert and oriented to person, place, and time.      GCS: GCS eye subscore is 4. GCS verbal subscore is 5. GCS motor subscore is 6.   Psychiatric:         Mood and Affect: Mood normal.         Behavior: Behavior normal.               ED Course     Labs Reviewed   COMP METABOLIC PANEL (14) - Abnormal; Notable for the following components:       Result Value     Glucose 136 (*)     CO2 35.0 (*)     BUN 61 (*)     Creatinine 7.42 (*)     Calculated Osmolality 309 (*)     eGFR-Cr 7 (*)     AST 12 (*)     All other components within normal limits   SARS-COV-2/FLU A AND B/RSV BY PCR (GENEXPERT) - Abnormal; Notable for the following components:    SARS-CoV-2 (COVID-19) - (GeneXpert) Detected (*)     All other components within normal limits    Narrative:     This test is intended for the qualitative detection and differentiation of SARS-CoV-2, influenza A, influenza B, and respiratory syncytial virus (RSV) viral RNA in nasopharyngeal or nares swabs from individuals suspected of respiratory viral infection consistent with COVID-19 by their healthcare provider. Signs and symptoms of respiratory viral infection due to SARS-CoV-2, influenza, and RSV can be similar.    Test performed using the Xpert Xpress SARS-CoV-2/FLU/RSV (real time RT-PCR)  assay on the [employer] instrument, [address] [employer], [place] [address].   This test is being used under the Food and Drug Administration's Emergency Use Authorization.    The authorized Fact Sheet for Healthcare Providers for this assay is available upon request from the laboratory.   CBC W/ DIFFERENTIAL - Abnormal; Notable for the following components:    RBC 2.79 (*)     HGB 9.9 (*)     HCT 28.4 (*)     PLT 99.0 (*)     .8 (*)     MCH 35.5 (*)     All other components within normal limits   CBC WITH DIFFERENTIAL WITH PLATELET    Narrative:     The following orders were created for panel order CBC With Differential With Platelet.  Procedure                               Abnormality         Status                     ---------                               -----------         ------                     CBC W/ DIFFERENTIAL[[id number]]          Abnormal            Final result                 Please view results for these tests on the individual orders.   SCAN SLIDE   COMP METABOLIC PANEL (14)             XR CHEST AP PORTABLE   (CPT=71045)    Result Date: 1/7/2024  CONCLUSION:   Stable cardiac and mediastinal contours.  Patchy left basilar/retrocardiac opacity is suspicious for pneumonia.  No associated pleural effusion or pneumothorax.  Left axillary vascular stent noted.   LOCATION:  [facility]      Dictated by (CST): Loki Blair MD on 1/07/2024 at 10:27 PM     Finalized by (CST): Loki Blair MD on 1/07/2024 at 10:28 PM               MDM   83-year-old male with a past medical history as below including ESRD on HD, multiple myeloma, hypertension and diabetes who presents with flulike symptoms starting yesterday.     Differential includes but is not limited to COVID, influenza other viral respiratory infection, pneumonia, bronchitis    COVID PCR is positive.  Labs show ESRD with normal electrolytes.  WBC is normal at 5.9.  Patient has chronic anemia likely related to ESRD.    Independent interpretation of chest x-ray shows mild haziness of the left lung base.  Radiology report reviewed as above noting a patchy left basilar/retrocardiac opacity suspicious for pneumonia.  Pneumonia likely related to COVID infection, will hold off on antibiotics at this time.    Discussed with ID Dr. Hooker.  Patient is not a candidate for Paxlovid due to ESRD.  Patient is high risk for progression serious disease given comorbidities and immunosuppression.  Will admit for treatment with remdesivir.  Discussed with hospitalist Dr. Paz.        Medical Decision Making  Amount and/or Complexity of Data Reviewed  Independent Historian: caregiver     Details: Daughter, see HPI  Labs: ordered. Decision-making details documented in ED Course.  Radiology: ordered and independent interpretation performed. Decision-making details documented in ED Course.  Discussion of management or test interpretation with external provider(s): ID, hospitalist    Risk  Decision regarding hospitalization.        Disposition and Plan     Clinical Impression:  1. COVID-19 virus infection     2. ESRD (end stage renal disease) on dialysis (HCC)    3. Immunosuppressed status (HCC)         Disposition:  Admit  1/7/2024 10:46 pm    Follow-up:  No follow-up provider specified.        Medications Prescribed:  Current Discharge Medication List                            Hospital Problems       Present on Admission  Date Reviewed: 12/5/2023            ICD-10-CM Noted POA    * (Principal) COVID-19 virus infection U07.1 1/7/2024 Unknown

## 2024-01-08 NOTE — ED QUICK NOTES
Orders for admission, patient is aware of plan and ready to go upstairs. Any questions, please call ED RN Baudilio at extension [phone].     Patient Covid vaccination status: Fully vaccinated     COVID Test Ordered in ED: SARS-CoV-2/Flu A and B/RSV by PCR (GeneXpert)    COVID Suspicion at Admission: N/A    Running Infusions:      Mental Status/LOC at time of transport: A/Ox3    Other pertinent information:   CIWA score: N/A   NIH score:  N/A

## 2024-01-08 NOTE — PHYSICAL THERAPY NOTE
PHYSICAL THERAPY EVALUATION - INPATIENT     Room Number: 523/523-A  Evaluation Date: 1/8/2024  Type of Evaluation: Initial  Physician Order: PT Eval and Treat    Presenting Problem: Covid 19  Co-Morbidities : fall, R upper arm fistula, ESRD on HD, DMT2, HTN, MM on oral chemo  Reason for Therapy: Mobility Dysfunction and Discharge Planning    History related to current admission: Patient is a 83 year old male admitted on 1/7/2024 from home for cough, chest congestion and aches.  Pt diagnosed with Covid 19.        ASSESSMENT   In this PT evaluation, the patient presents with the following impairments: L sided chest pain, impaired bed mobility, impaired transfers, impaired gait and decreased functional activity tolerance and endurance.  These impairments and comorbidities manifest themselves as functional limitations in independent bed mobility, transfers, and gait requiring use of a RW.  The patient is below baseline and would benefit from skilled inpatient PT to address the above deficits to assist patient in returning to prior to level of function.   Functional outcome measures completed include AMPAC.  The AM-PAC '6-Clicks' Inpatient Basic Mobility Short Form was completed and this patient is demonstrating a Approx Degree of Impairment: 35.83%  degree of impairment in mobility. Research supports that patients with this level of impairment may benefit from home with HHPT.    DISCHARGE RECOMMENDATIONS  PT Discharge Recommendations: Home with home health PT    PLAN  PT Treatment Plan: Endurance;Energy conservation;Patient education;Family education;Gait training;Strengthening;Transfer training  Rehab Potential : Good  Frequency (Obs): 3x/week  Number of Visits to Meet Established Goals: 3      CURRENT GOALS    Goal #1 Patient is able to demonstrate supine - sit EOB @ level: independent     Goal #2 Patient is able to demonstrate transfers Sit to/from Stand at assistance level: modified independent     Goal #3  Patient is able to ambulate 150 feet with assist device: walker - rolling at assistance level: supervision     Goal #4 Patient will ascend/descend 10 steps with handrail and supervision.   Goal #5    Goal #6    Goal Comments: Goals established on 2024    HOME SITUATION  Type of Home: House   Home Layout: Two level  Stairs to Enter : 1  Railing: Yes  Stairs to Bedroom: 10  Railing: Yes    Lives With: Daughter  Drives: No  Patient Owned Equipment: Rolling walker;Cane  Patient Regularly Uses: Reading glasses    Prior Level of [place]: Patient states he functions David with use of a cane or RW at baseline. Lives with his daughter.     SUBJECTIVE  \"I am doing better.\"       OBJECTIVE  Precautions: Bed/chair alarm (contact and droplet (Covid))  Fall Risk: High fall risk    WEIGHT BEARING RESTRICTION  Weight Bearing Restriction: None                PAIN ASSESSMENT  Ratin  Location: L side of chest  Management Techniques: Activity promotion;Repositioning    COGNITION  Following Commands:  follows all commands and directions without difficulty    RANGE OF MOTION AND STRENGTH ASSESSMENT  Upper extremity ROM and strength are within functional limits     Lower extremity ROM is within functional limits     Lower extremity strength is within functional limits       BALANCE  Static Sitting: Good  Dynamic Sitting: Good  Static Standing: Fair +  Dynamic Standing: Fair +    ADDITIONAL TESTS                                    ACTIVITY TOLERANCE  Pulse: 67  Heart Rate Source: Monitor                   O2 WALK  Oxygen Therapy  SPO2% on Room Air at Rest: 97    NEUROLOGICAL FINDINGS                        AM-PAC '6-Clicks' INPATIENT SHORT FORM - BASIC MOBILITY  How much difficulty does the patient currently have...  Patient Difficulty: Turning over in bed (including adjusting bedclothes, sheets and blankets)?: None   Patient Difficulty: Sitting down on and standing up from a chair with arms (e.g., wheelchair, bedside commode,  etc.): A Little   Patient Difficulty: Moving from lying on back to sitting on the side of the bed?: None   How much help from another person does the patient currently need...   Help from Another: Moving to and from a bed to a chair (including a wheelchair)?: A Little   Help from Another: Need to walk in hospital room?: A Little   Help from Another: Climbing 3-5 steps with a railing?: A Little       AM-PAC Score:  Raw Score: 20   Approx Degree of Impairment: 35.83%   Standardized Score (AM-PAC Scale): 47.67   CMS Modifier (G-Code): CJ    FUNCTIONAL ABILITY STATUS  Gait Assessment   Functional Mobility/Gait Assessment  Gait Assistance: Supervision  Distance (ft): 25  Assistive Device: Rolling walker  Pattern: Within Functional Limits    Skilled Therapy Provided     Bed Mobility:  Rolling: independent  Supine to sit: supervision   Sit to supine: NT     Transfer Mobility:  Sit to stand: verbal cuing for hand placement, supervision, RW   Stand to sit: verbal cuing for hand placement, supervision, RW  Gait = x25 ft. Within the room, supervision, RW - distance limited by Covid precautions in place    Therapist's Comments: Patient presents to PT supine in bed, VSS on room air and patient agreeable to working with PT. Patient progressed well with PT performing supine > sit, sit <> stand and short distance ambulation using a RW within the room all with supervision. Supervision also for supported stand with RW at the sink for hand hygiene. Patient left sitting up in the bedside chair at the end of the session with chair alarm donned, BLE's propped and all needs placed within reach. Recommend dc home with HHPT. RN updated.     Exercise/Education Provided:  Bed mobility  Body mechanics  Energy conservation  Functional activity tolerated  Gait training  Posture  Strengthening  Transfer training    Patient End of Session: Up in chair;Needs met;Call light within reach;RN aware of session/findings;All patient questions and concerns  addressed;Alarm set      Patient Evaluation Complexity Level:  History Low - no personal factors and/or co-morbidities   Examination of body systems Low - addressing 1-2 elements   Clinical Presentation Low - Stable   Clinical Decision Making Low - Stable       PT Session Time: 32 minutes  Gait Trainin minutes  Therapeutic Activity: 3 minutes  Neuromuscular Re-education: 0 minutes  Therapeutic Exercise: 0 minutes

## 2024-01-08 NOTE — CM/SW NOTE
01/08/24 1500   CM/SW Referral Data   Referral Source Social Work (self-referral)   Reason for Referral Discharge planning   Informant EMR;Daughter   Patient Info   Patient's Home Environment House   Patient lives with Daughter   Discharge Needs   Anticipated D/C needs Home health care     HOME SITUATION  Type of Home: House   Home Layout: Two level  Stairs to Enter : 1  Railing: Yes  Stairs to Bedroom: 10  Railing: Yes     Lives With: Daughter  Drives: No  Patient Owned Equipment: Rolling walker;Cane  Patient Regularly Uses: Reading glasses     Prior Level of [place]: Patient states he functions David with use of a cane or RW at baseline. Lives with his daughter.   (Per PT evaluation)      Patient is an 82 y/o male who admitted with ESRD (end stage renal disease) on dialysis, COVID-19. PT recommending HH.    Spoke with patient's daughter (Elida) to discuss discharge planning. Patient lives with other daughter (Jaky). Discussed PT rec of HH, she was agreeable. She confirmed patient still goes to [facility] for HD on TTS.    Sent referrals in aidin. Awaiting accepting provider and patient choice. SW will remain available.     ALEXA Lopez  Discharge Planner  [phone]

## 2024-01-08 NOTE — TELEPHONE ENCOUNTER
From: Sarika Rob  To: Baltazar Baker  Sent: 1/7/2024 5:09 PM CST  Subject: Body aches and flu conditions    My dad is having body aches and flu conditions. No fever though. What should I do?

## 2024-01-08 NOTE — H&P
[facility]     Sarika Rob Patient Status:  Emergency    10/3/1940 MRN [id number]   Location [facility] EMERGENCY DEPARTMENT Attending Candelaria Quiles MD   Hosp Day # 0 PCP Baltazar Baker MD     Chief Complaint: cough    Subjective:    History of Present Illness:     Sarika Rob is a 83 year old male with PMH ESRD on HD, DM2, HTN, MM on oral chemo. 1 day of cough, congestion, aches. Had covid last year requiring hospitalization. C/o mild SOB and fatigue. Denies cp, diarrhea. Last HD yesterday.     History/Other:    Past Medical History:  Past Medical History:   Diagnosis Date    Chronic kidney disease     stage 5    Diabetes (HCC)     ESRD (end stage renal disease) (HCC)     Essential hypertension     Glaucoma     High blood pressure     High cholesterol     History of blood transfusion     Multiple myeloma (HCC)     Myeloma (HCC)     Peripheral vascular disease (HCC)      Past Surgical History:   Past Surgical History:   Procedure Laterality Date    APPENDECTOMY      AV FISTULA REVISION, OPEN      OTHER      dialysis      Family History:   Family History   Problem Relation Age of Onset    Hypertension Father     Diabetes Father     Stroke Mother     Heart Disorder Mother      Social History:    reports that he has never smoked. He has never used smokeless tobacco. He reports that he does not drink alcohol and does not use drugs.     Allergies: No Known Allergies    Medications:    Current Facility-Administered Medications on File Prior to Encounter   Medication Dose Route Frequency Provider Last Rate Last Admin    [COMPLETED] patiromer (Veltassa) 8.4 g oral packet 8.4 g  8.4 g Oral Once Darrell Silva MD   8.4 g at 23 1612    [COMPLETED] lidocaine (Xylocaine) 1 % injection             [COMPLETED] fentaNYL (Sublimaze) 50 mcg/mL injection             [COMPLETED] midazolam (Versed) 2 MG/2ML injection             [COMPLETED] heparin (Porcine) 5000 UNIT/ML injection              [COMPLETED] sterile water for injection (PF) injection             [COMPLETED] alteplase (Activase) 2 mg injection             [COMPLETED] heparin (Porcine) 5000 UNIT/ML injection             [COMPLETED] iodixanol (VISIPAQUE) 320 MG/ML injection 50 mL  50 mL Injection ONCE PRN Cora Cardoso MD   40 mL at 11/27/23 1507    [COMPLETED] lidocaine (Xylocaine) 1 % injection             [COMPLETED] heparin (Porcine) 5000 UNIT/ML injection             [COMPLETED] heparin (Porcine) 1000 UNIT/ML injection 1,500 Units  1.5 mL Intracatheter Once Darrell Silva MD   1,500 Units at 11/25/23 0851     Current Outpatient Medications on File Prior to Encounter   Medication Sig Dispense Refill    Lenalidomide 5 MG Oral Cap TAKE 1 CAPSULE DAILY FOR 14 DAYS OF A 28 DAY CYCLE 14 capsule 0    folic acid 1 MG Oral Tab Take 1 tablet (1 mg total) by mouth daily. 90 tablet 0    rOPINIRole 1 MG Oral Tab Take 1 tablet (1 mg total) by mouth nightly. 90 tablet 0    hydrALAZINE 10 MG Oral Tab Take 1 tablet (10 mg total) by mouth 3 (three) times daily as needed (if  or grater). Give 10-20 mg  Give 50 mg if  or above      Melatonin 5 MG Oral Tab Take 1 tablet (5 mg total) by mouth nightly.      pregabalin 25 MG Oral Cap Take 1 capsule (25 mg total) by mouth 3 (three) times daily. 90 capsule 2    Insulin Lispro Prot & Lispro (HUMALOG MIX 75/25 KWIKPEN) (75-25) 100 UNIT/ML SC SUPN Inject 20 Units into the skin in the morning and 20 Units before bedtime. (Patient taking differently: Inject 5 Units into the skin in the morning and 5 Units before bedtime. Per sliding scale .) 5 each 1    amLODIPine 10 MG Oral Tab Take 1 tablet (10 mg total) by mouth daily. 90 tablet 1    losartan 100 MG Oral Tab Take 1 tablet (100 mg total) by mouth daily. 90 tablet 1    rosuvastatin 10 MG Oral Tab Take 1 tablet (10 mg total) by mouth nightly. 90 tablet 1    escitalopram 20 MG Oral Tab 20 mg. 1 tablet 90 tablet 1    calcium acetate 667 MG Oral Cap Take  2 capsules (1,334 mg total) by mouth 3 (three) times daily. 180 capsule 0    HYDROcodone-acetaminophen (NORCO)  MG Oral Tab Take 1 tablet by mouth every 8 (eight) hours as needed for Pain. 30 tablet 0    LATANOPROST OP Apply to eye.      cinacalcet 30 MG Oral Tab Take 1 tablet (30 mg total) by mouth daily with breakfast. T, Th, Sat      BABY ASPIRIN OR Take 81 mg by mouth daily.      docusate sodium 100 MG Oral Cap 1 capsule (100 mg total) 2 (two) times daily. PRN      Insulin Pen Needle (TRUEPLUS PEN NEEDLES) 32G X 4 MM Does not apply Misc 1 each by other route in the morning and 1 each before bedtime 200 each 3    Continuous Blood Gluc Sensor (DEXCOM G7 SENSOR) Does not apply Misc 1 each Every 10 days. 10 each 1    Glucose Blood (ONETOUCH VERIO) In Vitro Strip 1 each by Other route daily. 100 strip 1    albuterol 108 (90 Base) MCG/ACT Inhalation Aero Soln Inhale 2 puffs into the lungs every 6 (six) hours as needed for Wheezing. 1 each 0    Continuous Blood Gluc Transmit (DEXCOM G6 TRANSMITTER) Does not apply Misc 1 each As Directed. Use with Dexcom G6 sensor. 1 every 90 days. 1 each 0    Insulin Pen Needle (TRUEPLUS 5-BEVEL PEN NEEDLES) 32G X 4 MM Does not apply Misc Test 2 times daily. 200 each 3    OneTouch Delica Lancets 33G Does not apply Misc Test once daily 100 each 3    Blood Glucose Monitoring Suppl (ONETOUCH VERIO) w/Device Does not apply Kit 1 kit by Does not apply route as needed. 1 kit 0       Review of Systems:   A comprehensive review of systems was completed.    Pertinent positives and negatives noted in the HPI.    Objective:   Physical Exam:    /75   Pulse 66   Temp 99.2 °F (37.3 °C) (Temporal)   Resp 17   Ht 5' 9\" (1.753 m)   Wt 167 lb 8.8 oz (76 kg)   SpO2 98%   BMI 24.74 kg/m²   General: No acute distress, Alert  Respiratory: No rhonchi, no wheezes  Cardiovascular: S1, S2. Regular rate and rhythm  Abdomen: Soft, Non-tender, non-distended, positive bowel sounds  Neuro: No new  focal deficits  Extremities: No edema      Results:    Labs:      Labs Last 24 Hours:    Recent Labs   Lab 01/07/24  2208   RBC 2.79*   HGB 9.9*   HCT 28.4*   .8*   MCH 35.5*   MCHC 34.9   RDW 13.6   NEPRELIM 4.25   WBC 5.9       No results for input(s): \"GLU\", \"BUN\", \"CREATSERUM\", \"GFRAA\", \"GFRNAA\", \"EGFRCR\", \"CA\", \"ALB\", \"NA\", \"K\", \"CL\", \"CO2\", \"ALKPHO\", \"AST\", \"ALT\", \"BILT\", \"TP\" in the last 168 hours.    Lab Results   Component Value Date    INR 1.13 11/24/2023    INR 1.03 11/29/2022       No results for input(s): \"TROP\", \"TROPHS\", \"CK\" in the last 168 hours.    No results for input(s): \"TROP\", \"PBNP\" in the last 168 hours.    No results for input(s): \"PCT\" in the last 168 hours.    Imaging: Imaging data reviewed in Epic.    Assessment & Plan:      #COVID  #LLL CAP, possibly viral?  -not a candidate for paxlovid w/ renal failure  -ID consulted in ED, recommended admission for RDV due to high risk  -no significant hypoxia noted  -start empiric abx, check procal. consider DC if negative  -cxs    #ESRD on HD  -nephro    #MM   -hold oral chemo    #HTN  #DM2  -basal bolus insulin      Plan of care discussed with pt, ed physician     Jose Paz MD    Supplementary Documentation:     The 21st Century Cures Act makes medical notes like these available to patients in the interest of transparency. Please be advised this is a medical document. Medical documents are intended to carry relevant information, facts as evident, and the clinical opinion of the practitioner. The medical note is intended as peer to peer communication and may appear blunt or direct. It is written in medical language and may contain abbreviations or verbiage that are unfamiliar.

## 2024-01-08 NOTE — ED QUICK NOTES
Rounding Completed    Plan of Care reviewed. Waiting for transport to clean, inpatient bed. .  Daughter remains at bedside.     Bed is locked and in lowest position. Call light within reach.

## 2024-01-09 VITALS
RESPIRATION RATE: 18 BRPM | DIASTOLIC BLOOD PRESSURE: 53 MMHG | SYSTOLIC BLOOD PRESSURE: 117 MMHG | WEIGHT: 164.5 LBS | HEIGHT: 69 IN | BODY MASS INDEX: 24.37 KG/M2 | HEART RATE: 57 BPM | OXYGEN SATURATION: 97 % | TEMPERATURE: 98 F

## 2024-01-09 LAB
BASOPHILS # BLD AUTO: 0.02 X10(3) UL (ref 0–0.2)
BASOPHILS NFR BLD AUTO: 0.4 %
EOSINOPHIL # BLD AUTO: 0.09 X10(3) UL (ref 0–0.7)
EOSINOPHIL NFR BLD AUTO: 2 %
ERYTHROCYTE [DISTWIDTH] IN BLOOD BY AUTOMATED COUNT: 13.8 %
GLUCOSE BLD-MCNC: 112 MG/DL (ref 70–99)
GLUCOSE BLD-MCNC: 82 MG/DL (ref 70–99)
GLUCOSE BLD-MCNC: 88 MG/DL (ref 70–99)
HBV CORE AB SERPL QL IA: NONREACTIVE
HBV SURFACE AB SER QL: REACTIVE
HBV SURFACE AB SERPL IA-ACNC: 12.17 MIU/ML
HBV SURFACE AG SER-ACNC: 0.11 [IU]/L
HBV SURFACE AG SERPL QL IA: NONREACTIVE
HCT VFR BLD AUTO: 26.7 %
HGB BLD-MCNC: 9 G/DL
IMM GRANULOCYTES # BLD AUTO: 0.02 X10(3) UL (ref 0–1)
IMM GRANULOCYTES NFR BLD: 0.4 %
LYMPHOCYTES # BLD AUTO: 1.3 X10(3) UL (ref 1–4)
LYMPHOCYTES NFR BLD AUTO: 28.2 %
MAGNESIUM SERPL-MCNC: 2.3 MG/DL (ref 1.6–2.6)
MCH RBC QN AUTO: 35.2 PG (ref 26–34)
MCHC RBC AUTO-ENTMCNC: 33.7 G/DL (ref 31–37)
MCV RBC AUTO: 104.3 FL
MONOCYTES # BLD AUTO: 0.3 X10(3) UL (ref 0.1–1)
MONOCYTES NFR BLD AUTO: 6.5 %
NEUTROPHILS # BLD AUTO: 2.88 X10 (3) UL (ref 1.5–7.7)
NEUTROPHILS # BLD AUTO: 2.88 X10(3) UL (ref 1.5–7.7)
NEUTROPHILS NFR BLD AUTO: 62.5 %
PHOSPHATE SERPL-MCNC: 3.9 MG/DL (ref 2.5–4.9)
PLATELET # BLD AUTO: 87 10(3)UL (ref 150–450)
RBC # BLD AUTO: 2.56 X10(6)UL
WBC # BLD AUTO: 4.6 X10(3) UL (ref 4–11)

## 2024-01-09 PROCEDURE — 99239 HOSP IP/OBS DSCHRG MGMT >30: CPT | Performed by: INTERNAL MEDICINE

## 2024-01-09 RX ORDER — CEFUROXIME AXETIL 500 MG/1
500 TABLET ORAL
Qty: 3 TABLET | Refills: 0 | Status: SHIPPED | OUTPATIENT
Start: 2024-01-11 | End: 2024-01-09

## 2024-01-09 RX ORDER — CEFUROXIME AXETIL 250 MG/1
250 TABLET ORAL EVERY 12 HOURS SCHEDULED
Status: DISCONTINUED | OUTPATIENT
Start: 2024-01-09 | End: 2024-01-09

## 2024-01-09 RX ORDER — CEFUROXIME AXETIL 500 MG/1
500 TABLET ORAL DAILY
Qty: 5 TABLET | Refills: 0 | Status: DISCONTINUED | OUTPATIENT
Start: 2024-01-09 | End: 2024-01-09

## 2024-01-09 RX ORDER — INSULIN LISPRO 100 [IU]/ML
5 INJECTION, SUSPENSION SUBCUTANEOUS 2 TIMES DAILY
Status: SHIPPED | COMMUNITY
Start: 2024-01-09

## 2024-01-09 RX ORDER — CEFUROXIME AXETIL 500 MG/1
500 TABLET ORAL
Status: DISCONTINUED | OUTPATIENT
Start: 2024-01-09 | End: 2024-01-09

## 2024-01-09 NOTE — DISCHARGE SUMMARY
[facility]  DISCHARGE SUMMARY     Sarika Rob Patient Status:  Inpatient    10/3/1940 MRN [id number]   Location [facility] 5NW-A Attending Parisa Vargas,    Hosp Day # 1 PCP Baltazar Baker MD     Date of Admission: 2024  Date of Discharge:   2024    Discharge Disposition: Home or Self Care    Discharge Diagnosis:  #COVID-19 with superimposed bacterial PNA  -not a candidate for paxlovid w/ renal failure  -ID consulted in ED, recommended admission for RDV due to high risk  -no significant hypoxia noted  -antibx CAP coverage  -Remdesevir per ID     #ESRD on HD  -HD per nephrology on consult      #MM   -on Revlimid     #Essential HTN  -Amlodipine, Losartan     #Chronic Anemia/Thrombocytopenia, history of Multiple Myeloma   -monitor counts     #Anxiety/Depression  -SSRI    History of Present Illness:      Sarika Rob is a 83 year old male with PMH ESRD on HD, DM2, HTN, MM on oral chemo. 1 day of cough, congestion, aches. Had covid last year requiring hospitalization. C/o mild SOB and fatigue. Denies cp, diarrhea. Last HD yesterday.     Brief Synopsis: Patient was admitted, treated with remdesivir for 3 days, symptomatically improved, also treated with antibiotics for pneumonia, to complete course upon discharge, patient being discharged home after dialysis.    Lace+ Score: 78  59-90 High Risk  29-58 Medium Risk  0-28   Low Risk       TCM Follow-Up Recommendation:  LACE > 58: High Risk of readmission after discharge from the hospital.      Procedures during hospitalization:   Regular HD    Incidental or significant findings and recommendations (brief descriptions):  N/a    Lab/Test results pending at Discharge:   N/a    Consultants:  Nephrology, ID    Discharge Medication List:     Discharge Medications        START taking these medications        Instructions Prescription details   cefuroxime 500 MG Tabs  Commonly known as: Ceftin  Start taking on: 2024      Take 1 tablet (500 mg  total) by mouth every other day for 3 doses. On Dialysis days, take AFTER Hemodialysis   Stop taking on: January 16, 2024  Quantity: 3 tablet  Refills: 0            CHANGE how you take these medications        Instructions Prescription details   Lenalidomide 5 MG Caps  What changed:   how much to take  how to take this  when to take this  additional instructions      TAKE 1 CAPSULE DAILY FOR 14 DAYS OF A 28 DAY CYCLE   Quantity: 14 capsule  Refills: 0            CONTINUE taking these medications        Instructions Prescription details   albuterol 108 (90 Base) MCG/ACT Aers  Commonly known as: Ventolin HFA      Inhale 2 puffs into the lungs every 6 (six) hours as needed for Wheezing.   Quantity: 1 each  Refills: 0     amLODIPine 10 MG Tabs  Commonly known as: Norvasc      Take 1 tablet (10 mg total) by mouth daily.   Quantity: 90 tablet  Refills: 1     BABY ASPIRIN OR      Take 81 mg by mouth daily.   Refills: 0     calcium acetate 667 MG Caps  Commonly known as: Phoslo      Take 2 capsules (1,334 mg total) by mouth 3 (three) times daily.   Quantity: 180 capsule  Refills: 0     cinacalcet 30 MG Tabs  Commonly known as: Sensipar      Take 1 tablet (30 mg total) by mouth daily with breakfast. T, Th, Sat   Refills: 0     Dexcom G6 Transmitter Misc      1 each As Directed. Use with Dexcom G6 sensor. 1 every 90 days.   Quantity: 1 each  Refills: 0     Dexcom G7 Sensor Misc      1 each Every 10 days.   Quantity: 10 each  Refills: 1     docusate sodium 100 MG Caps  Commonly known as: Colace      1 capsule (100 mg total) 2 (two) times daily. PRN   Refills: 0     escitalopram 20 MG Tabs  Commonly known as: Lexapro      20 mg. 1 tablet   Quantity: 90 tablet  Refills: 1     folic acid 1 MG Tabs  Commonly known as: Folvite      Take 1 tablet (1 mg total) by mouth daily.   Quantity: 90 tablet  Refills: 0     HumaLOG Mix 75/25 KwikPen (75-25) 100 UNIT/ML Supn  Generic drug: Insulin Lispro Prot & Lispro      Inject 5 Units into  the skin in the morning and 5 Units before bedtime. Per sliding scale.   Refills: 0     hydrALAZINE 10 MG Tabs  Commonly known as: Apresoline      Take 1 tablet (10 mg total) by mouth 3 (three) times daily as needed (if  or grater). Give 10-20 mg  Give 50 mg if  or above   Refills: 0     HYDROcodone-acetaminophen  MG Tabs  Commonly known as: Norco      Take 1 tablet by mouth every 8 (eight) hours as needed for Pain.   Quantity: 30 tablet  Refills: 0     LATANOPROST OP      Apply 1 drop to eye at bedtime. One drop to each eye nightly   Refills: 0     losartan 100 MG Tabs  Commonly known as: Cozaar      Take 1 tablet (100 mg total) by mouth daily.   Quantity: 90 tablet  Refills: 1     Melatonin 5 MG Tabs      Take 1 tablet (5 mg total) by mouth nightly.   Refills: 0     OneTouch Delica Lancets 33G Misc      Test once daily   Quantity: 100 each  Refills: 3     OneTouch Verio Strp      1 each by Other route daily.   Quantity: 100 strip  Refills: 1     OneTouch Verio w/Device Kit      1 kit by Does not apply route as needed.   Quantity: 1 kit  Refills: 0     pregabalin 25 MG Caps  Commonly known as: Lyrica      Take 1 capsule (25 mg total) by mouth 3 (three) times daily.   Quantity: 90 capsule  Refills: 2     rOPINIRole 1 MG Tabs  Commonly known as: Requip      Take 1 tablet (1 mg total) by mouth nightly.   Quantity: 90 tablet  Refills: 0     rosuvastatin 10 MG Tabs  Commonly known as: Crestor      Take 1 tablet (10 mg total) by mouth nightly.   Quantity: 90 tablet  Refills: 1     TRUEplus 5-Bevel Pen Needles 32G X 4 MM Misc  Generic drug: Insulin Pen Needle      Test 2 times daily.   Quantity: 200 each  Refills: 3     TRUEplus Pen Needles 32G X 4 MM Misc  Generic drug: Insulin Pen Needle      1 each by other route in the morning and 1 each before bedtime   Quantity: 200 each  Refills: 3               Where to Get Your Medications        These medications were sent to [id number] -  [place], [place] - [address] [phone], [phone]  [facility] [address] [address]      Phone: [phone]   cefuroxime 500 MG Tabs         [place]PMP reviewed[place] n/a    Follow-up appointment:   No follow-up provider specified.  Appointments for Next 30 Days 2024 - 2024        Date Arrival Time Visit Type Length Department Provider     2024 10:30 AM  FOLLOW UP-HEM/ONC [7111] 15 min [facility] in [place] Issa Hanna MD    Patient Instructions:         Location Instructions:     **IF YOU NEED LABWORK OR AN INFUSION ALONG WITH YOUR APPOINTMENT, YOU MUST CALL TO SCHEDULE.**  Your appointment is on the [facility] campus in the [facility]. The address is [address] [place] [address]. Please register at the [facility]  on the second floor.  Masks are optional for all patients and visitors, unless otherwise indicated.                      Vital signs:  Temp:  [97.7 °F (36.5 °C)-98.4 °F (36.9 °C)] 97.7 °F (36.5 °C)  Pulse:  [57-67] 57  Resp:  [17-18] 18  BP: (117-133)/(53-58) 117/53  SpO2:  [96 %-98 %] 97 %    Physical Exam:    General: No acute distress   Lungs: clear to auscultation  Cardiovascular: S1, S2  Abdomen: Soft      -----------------------------------------------------------------------------------------------  PATIENT DISCHARGE INSTRUCTIONS: See electronic chart    Parisa Vargas DO    Total time spent on discharge plannin minutes     The  Cures Act makes medical notes like these available to patients in the interest of transparency. Please be advised this is a medical document. Medical documents are intended to carry relevant information, facts as evident, and the clinical opinion of the practitioner. The medical note is intended as peer to peer communication and may appear blunt or direct. It is written in medical language and may contain abbreviations or verbiage that are unfamiliar.

## 2024-01-09 NOTE — PROGRESS NOTES
Patient is alert and oriented x 4, afebrile, no c/o pain. On RA, Remdesevir #2, IV Rocephin given. NSR on telemetry, Heparin s.c. Renal diet, 1000 mL fluid restrictions - 370 mL overnight. No void, LBM 1/7, on Colace.Ambulates with 1 person assist with a walker.     Patient is ESRD, HD scheduled for today, patient aware, agreeable with POC.

## 2024-01-09 NOTE — PROGRESS NOTES
INFECTIOUS DISEASE  PROGRESS NOTE            [facility] Patient Status:  Inpatient    10/3/1940 MRN [id number]   Formerly [facility] 5NW-A Attending Parisa Vargas,    Hosp Day # 1 PCP Baltazar Baker MD     Antibiotics: Remdesivir #2    Subjective:  : resting comfortable    Objective:  Temp:  [97.7 °F (36.5 °C)-98.4 °F (36.9 °C)] 97.7 °F (36.5 °C)  Pulse:  [57-67] 57  Resp:  [17-18] 18  BP: (128-133)/(54-58) 129/58  SpO2:  [96 %-98 %] 97 %    General: in no distress  Vital signs:Temp:  [97.7 °F (36.5 °C)-98.4 °F (36.9 °C)] 97.7 °F (36.5 °C)  Pulse:  [57-67] 57  Resp:  [17-18] 18  BP: (128-133)/(54-58) 129/58  SpO2:  [96 %-98 %] 97 %  HEENT: no sweling  Neck: supple no masses  Respiratory: Non labored, symmetric excursion, normal respirations  Cardiovascular: no irregularities in rhythm  Abdomen: Soft, nontender, nondistended.   Musculoskeletal: joints: no swelling   Integument: No lesions. No erythema. No open wounds.  Labs:     COVID-19 Lab Results    COVID-19  Lab Results   Component Value Date    COVID19 Detected (A) 2024    COVID19 Not Detected 10/17/2023    COVID19 Not Detected 2022       Pro-Calcitonin  Recent Labs   Lab 24  2208   PCT 1.15*       Cardiac  No results for input(s): \"TROP\", \"PBNP\" in the last 168 hours.    Creatinine Kinase  No results for input(s): \"CK\" in the last 168 hours.    Inflammatory Markers  No results for input(s): \"CRP\", \"BRIAN\", \"LDH\", \"DDIMER\" in the last 168 hours.    Recent Labs   Lab 24  0913   RBC 2.45*   HGB 8.6*   HCT 25.4*   .7*   MCH 35.1*   MCHC 33.9   RDW 13.8   NEPRELIM 3.32   WBC 4.6   PLT 83.0*         Recent Labs   Lab 24  2208 24  0913   * 121*   BUN 61* 68*   CREATSERUM 7.42* 8.20*   CA 9.0 8.4*   ALB 3.6 3.0*    139   K 4.4 4.2    103   CO2 35.0* 27.0   ALKPHO 73 59   AST 12* 10*   ALT 20 17   BILT 0.4 0.4   TP 7.3 6.1*       No results found for:  \"VANCT\"  Microbiology    No results found for this visit on 01/07/24.    Problem list reviewed:  Patient Active Problem List   Diagnosis    Type 2 diabetes mellitus with chronic kidney disease on chronic dialysis, with long-term current use of insulin (HCC)    Type 2 diabetes mellitus with retinopathy, with long-term current use of insulin, macular edema presence unspecified, unspecified laterality, unspecified retinopathy severity (HCC)    Type 2 diabetes mellitus with polyneuropathy ([facility])    Essential hypertension    Multiple myeloma ([facility])    LBBB (left bundle branch block)    CKD (chronic kidney disease) requiring chronic dialysis (HCC)    Chronic kidney failure    Orthostatic hypotension    Thrombocytopenia (HCC)    Anemia    Acute renal failure (ARF) (HCC)    Acute kidney injury (HCC)    ESRD (end stage renal disease) on dialysis ([facility])    Fever, unspecified fever cause    ESRD (end stage renal disease) ([facility])    ESRD on hemodialysis ([facility])    Allergy, unspecified, initial encounter    Anaphylactic shock, unspecified, initial encounter    Major depressive disorder, recurrent, mild ([facility])    Normochromic anemia    Long-term current use of lenalidomide    Other polyneuropathy    COVID    COVID-19    Hyperkalemia    Parkinson's disease    Anemia in ESRD (end-stage renal disease)  ([facility])    Encounter for blood transfusion    Pain, unspecified    Fluid overload, unspecified    Cellulitis of right upper extremity    Vesicular skin lesions    Hyperglycemia    Shortness of breath    CKD (chronic kidney disease) stage 1, GFR 90 ml/min or greater    Multiple myeloma not having achieved remission ([facility])    Alzheimer's disease, unspecified ([facility])    Complication of arteriovenous dialysis fistula, initial encounter    Anemia due to end stage renal disease  ([facility])    Chemotherapy management, encounter for    Hypercholesteremia    COVID-19 virus infection    Immunosuppressed status ([facility])             ASSESSMENT/PLAN:  1. COVID  -last  recorded vaccination > 2 years ago with original vaccine, no record of vaccination with this years components   -underlying ESRD and myeloma (was on oral chemo)     Symptom onset 1 -2 days prior to admission  Mild/moderate symptoms     Received 2 days of Remdesivir  Symptoms improving, discharge planning for today after dialysis. Can dc ceftriaxone      Gerardo Cox MD, MD [facility]  [phone]

## 2024-01-09 NOTE — DISCHARGE SUMMARY
Received referral via [facility] for Home Health services. Spoke w/ patients daughter who is agreeable with [facility]. Contact information placed on AVS.

## 2024-01-09 NOTE — OCCUPATIONAL THERAPY NOTE
Attempted to see pt for skilled OT services this date. Pt is currently receiving dialysis and is then supposed to DC per RN. Archana Richard, 01/09/24, 3:34 PM

## 2024-01-09 NOTE — CM/SW NOTE
Spoke with patient's daughter (Elida) to discuss discharge planning. Emailed her accepting [facility] list at [email or web address]. Await choice.     SW/CM to remain available for support and/or discharge planning.    Addendum 3pm: Spoke with Elida again, they were agreeable with [facility]. Reserved in aidin and updated AVS.       ALEXA Lopez  Discharge Planner  [phone]

## 2024-01-09 NOTE — DISCHARGE INSTRUCTIONS
Sometimes managing your health at home requires assistance.  The [facility] team has recognized your preference to use [facility].  They can be reached by phone at [phone].  The fax number for your reference is [phone].. A representative from the home health agency will contact you or your family to schedule your first visit.

## 2024-01-09 NOTE — PROGRESS NOTES
[facility] Pharmacy Note:  Renal Adjustment for cefuroxime (CEFTIN)    Sarika oRb is a 83 year old patient who has been prescribed cefuroxime (CEFTIN) 500 mg every 24 hrs.  The estimated creatinine clearance is 6.9 mL/min (A) (based on SCr of 8.2 mg/dL (H)). The dose has been adjusted to cefuroxime (CEFTIN) 500 mg every 48 hrs per hospital renal dose adjustment protocol for treatment of pneumonia.  Pharmacy will follow and adjust dose as warranted for additional renal function changes.    Thank you,    Alicia Lopez, PharmD  1/9/2024  1:00 PM

## 2024-01-10 ENCOUNTER — PATIENT OUTREACH (OUTPATIENT)
Dept: CASE MANAGEMENT | Age: 84
End: 2024-01-10

## 2024-01-10 NOTE — PAYOR COMM NOTE
--------------  ADMISSION REVIEW     Payor: [facility]  Subscriber #:  [id number]  Authorization Number: [id number]    Admit date: 1/8/24  Admit time:  1:10 AM       REVIEW DOCUMENTATION:  ED Provider Notes signed by Candelaria Quiles MD at 1/7/2024 11:30 PM    Patient Seen in: [facility] Emergency Department      History     Chief Complaint   Patient presents with    Cough/URI     Body aches, cold like symptoms.      Stated Complaint: body aches, cold like symptoms    Subjective:   HPI    83-year-old male with a past medical history as below including ESRD on HD, multiple myeloma, hypertension and diabetes who presents with flulike symptoms starting yesterday.  He reports runny nose, cough, body aches and fatigue.  Patient reports feeling mildly short of breath.  He denies chest pain.  Denies vomiting or diarrhea.  Last dialysis yesterday.  Patient has been fully vaccinated for COVID.  Daughter states he had COVID last year and needed hospitalization.    Objective:   Past Medical History:   Diagnosis Date    Chronic kidney disease     stage 5    Diabetes (HCC)     ESRD (end stage renal disease) (HCC)     Essential hypertension     Glaucoma     High blood pressure     High cholesterol     History of blood transfusion     Multiple myeloma (HCC)     Myeloma (HCC)     Peripheral vascular disease (HCC)    Review of Systems   Constitutional:  Positive for chills and fatigue.       Positive for stated complaint: body aches, cold like symptoms  Other systems are as noted in HPI.  Constitutional and vital signs reviewed.      All other systems reviewed and negative except as noted above.    Physical Exam     ED Triage Vitals [01/07/24 2055]   /65   Pulse 75   Resp 18   Temp 99.2 °F (37.3 °C)   Temp src Temporal   SpO2 94 %   O2 Device None (Room air)       Current:/57   Pulse 66   Temp 99.2 °F (37.3 °C) (Temporal)   Resp 19   Ht 175.3 cm (5' 9\")   Wt 76 kg   SpO2 97%    BMI 24.74 kg/m²         Physical Exam  Vitals and nursing note reviewed.   Constitutional:       General: He is not in acute distress.     Appearance: He is well-developed. He is not ill-appearing.   HENT:      Head: Normocephalic and atraumatic.      Mouth/Throat:      Mouth: Mucous membranes are moist.   Eyes:      General: No scleral icterus.     Extraocular Movements: Extraocular movements intact.   Cardiovascular:      Rate and Rhythm: Normal rate and regular rhythm.   Pulmonary:      Effort: Pulmonary effort is normal.      Breath sounds: Normal breath sounds.   Abdominal:      Palpations: Abdomen is soft.      Tenderness: There is no abdominal tenderness.   Musculoskeletal:      Cervical back: Neck supple.   Skin:     General: Skin is warm and dry.      Capillary Refill: Capillary refill takes less than 2 seconds.   Neurological:      Mental Status: He is alert and oriented to person, place, and time.      GCS: GCS eye subscore is 4. GCS verbal subscore is 5. GCS motor subscore is 6.   Psychiatric:         Mood and Affect: Mood normal.         Behavior: Behavior normal.        Labs Reviewed   COMP METABOLIC PANEL (14) - Abnormal; Notable for the following components:       Result Value    Glucose 136 (*)     CO2 35.0 (*)     BUN 61 (*)     Creatinine 7.42 (*)     Calculated Osmolality 309 (*)     eGFR-Cr 7 (*)     AST 12 (*)     All other components within normal limits   SARS-COV-2/FLU A AND B/RSV BY PCR (GENEXPERT) - Abnormal; Notable for the following components:    SARS-CoV-2 (COVID-19) - (GeneXpert) Detected (*)     All other components within normal limits    Narrative:     This test is intended for the qualitative detection and differentiation of SARS-CoV-2, influenza A, influenza B, and respiratory syncytial virus (RSV) viral RNA in nasopharyngeal or nares swabs from individuals suspected of respiratory viral infection consistent with COVID-19 by their healthcare provider. Signs and symptoms of  respiratory viral infection due to SARS-CoV-2, influenza, and RSV can be similar.    Test performed using the Xpert Xpress SARS-CoV-2/FLU/RSV (real time RT-PCR)  assay on the [employer] instrument, [employer], [place] [address].   This test is being used under the Food and Drug Administration's Emergency Use Authorization.    The authorized Fact Sheet for Healthcare Providers for this assay is available upon request from the laboratory.   CBC W/ DIFFERENTIAL - Abnormal; Notable for the following components:    RBC 2.79 (*)     HGB 9.9 (*)     HCT 28.4 (*)     PLT 99.0 (*)     .8 (*)     MCH 35.5 (*)     All other components within normal limits   CBC WITH DIFFERENTIAL WITH PLATELET    Narrative:     The following orders were created for panel order CBC With Differential With Platelet.  Procedure                               Abnormality         Status                     ---------                               -----------         ------                     CBC W/ DIFFERENTIAL[[id number]]          Abnormal            Final result                 Please view results for these tests on the individual orders.   SCAN SLIDE   COMP METABOLIC PANEL (14)          XR CHEST AP PORTABLE  (CPT=71045)    Result Date: 1/7/2024  CONCLUSION:   Stable cardiac and mediastinal contours.  Patchy left basilar/retrocardiac opacity is suspicious for pneumonia.  No associated pleural effusion or pneumothorax.  Left axillary vascular stent noted.   LOCATION:  [facility]      Dictated by (CST): Loki Blair MD on 1/07/2024 at 10:27 PM     Finalized by (CST): Loki Blair MD on 1/07/2024 at 10:28 PM          MDM   83-year-old male with a past medical history as below including ESRD on HD, multiple myeloma, hypertension and diabetes who presents with flulike symptoms starting yesterday.     Differential includes but is not limited to COVID, influenza other viral respiratory infection, pneumonia, bronchitis    COVID PCR is positive.  Labs show  ESRD with normal electrolytes.  WBC is normal at 5.9.  Patient has chronic anemia likely related to ESRD.    Independent interpretation of chest x-ray shows mild haziness of the left lung base.  Radiology report reviewed as above noting a patchy left basilar/retrocardiac opacity suspicious for pneumonia.  Pneumonia likely related to COVID infection, will hold off on antibiotics at this time.    Discussed with ID Dr. Hooker.  Patient is not a candidate for Paxlovid due to ESRD.  Patient is high risk for progression serious disease given comorbidities and immunosuppression.  Will admit for treatment with remdesivir.  Discussed with hospitalist Dr. Paz.        Medical Decision Making  Amount and/or Complexity of Data Reviewed  Independent Historian: caregiver     Details: Daughter, see HPI  Labs: ordered. Decision-making details documented in ED Course.  Radiology: ordered and independent interpretation performed. Decision-making details documented in ED Course.  Discussion of management or test interpretation with external provider(s): ID, hospitalist    Risk  Decision regarding hospitalization.        Disposition and Plan     Clinical Impression:  1. COVID-19 virus infection    2. ESRD (end stage renal disease) on dialysis (HCC)    3. Immunosuppressed status (HCC)         Disposition:  Admit  1/7/2024 10:46 pm  Hospital Problems       Present on Admission  Date Reviewed: 12/5/2023            ICD-10-CM Noted POA    * (Principal) COVID-19 virus infection U07.1 1/7/2024 Unknown       Signed by Candelaria Quiles MD on 1/7/2024 11:30 PM           H&P signed by Jose Paz MD at 1/8/2024  1:06 AM    Chief Complaint: cough    Subjective:    History of Present Illness:     Sarika Rob is a 83 year old male with PMH ESRD on HD, DM2, HTN, MM on oral chemo. 1 day of cough, congestion, aches. Had covid last year requiring hospitalization. C/o mild SOB and fatigue. Denies cp, diarrhea. Last HD yesterday.     Objective:    Physical Exam:    /75   Pulse 66   Temp 99.2 °F (37.3 °C) (Temporal)   Resp 17   Ht 5' 9\" (1.753 m)   Wt 167 lb 8.8 oz (76 kg)   SpO2 98%   BMI 24.74 kg/m²   General: No acute distress, Alert  Respiratory: No rhonchi, no wheezes  Cardiovascular: S1, S2. Regular rate and rhythm  Abdomen: Soft, Non-tender, non-distended, positive bowel sounds  Neuro: No new focal deficits  Extremities: No edema      Recent Labs   Lab 01/07/24  2208   RBC 2.79*   HGB 9.9*   HCT 28.4*   .8*   MCH 35.5*   MCHC 34.9   RDW 13.6   NEPRELIM 4.25   WBC 5.9     Lab Results   Component Value Date    INR 1.13 11/24/2023    INR 1.03 11/29/2022     Assessment & Plan:      #COVID  #LLL CAP, possibly viral?  -not a candidate for paxlovid w/ renal failure  -ID consulted in ED, recommended admission for RDV due to high risk  -no significant hypoxia noted  -start empiric abx, check procal. consider DC if negative  -cxs    #ESRD on HD  -nephro    #MM   -hold oral chemo    #HTN  #DM2  -basal bolus insulin  Electronically signed by Jose Paz MD on 1/8/2024  1:06 AM           1/8 IM  Chief Complaint: Cough        Subjective:   Patient feels weak with some pain in his ribs/chest but only when he coughs    Temp:  [97.8 °F (36.6 °C)-99.2 °F (37.3 °C)] 97.8 °F (36.6 °C)  Pulse:  [62-75] 62  Resp:  [16-19] 18  BP: (115-137)/(55-80) 123/59  SpO2:  [94 %-98 %] 96 %     azithromycin  500 mg Intravenous Q24H    cefTRIAXone  1 g Intravenous Q24H    guaiFENesin ER  600 mg Oral BID    remdesivir  100 mg Intravenous Q24H               Assessment & Plan:   #COVID-19 with superimposed bacterial PNA  -not a candidate for paxlovid w/ renal failure  -ID consulted in ED, recommended admission for RDV due to high risk  -no significant hypoxia noted  -antibx CAP coverage  -Remdesevir per ID     #ESRD on HD  -HD per nephrology on consult      #MM   -on Revlimid     #Essential HTN  -Amlodipine, Losartan     #Chronic Anemia/Thrombocytopenia, history of  Multiple Myeloma   -monitor counts     #Anxiety/Depression  -SSRI        1/8 Nephrology  Reason for Consultation:  ESRD on hemodialysis/cough     History of Present Illness:  Sarika Rob is a a(n) 83 year old man well-known to our service with multiple medical problems including history of end-stage renal disease due to diabetes and multiple myeloma currently on hemodialysis Tuesday/Thursday/Saturday at [facility] who presented to the emergency department yesterday with complaints of shortness of breath and fatigue.  Workup suggestive of left lower lobe pneumonia in the setting of COVID and he was admitted for further evaluation and management.      Impression/Plan:     #1.  Shortness of breath/cough-noted to be COVID-positive in the emergency department also with abnormal chest x-ray suggestive of left lower lobe infiltrate.  Placed on empiric antibiotics as well as remdesivir.  Infectious disease is to evaluate     #2.  ESRD-due to hypertension as well as a history of multiple myeloma.  Dialysis to continue Tuesday/Thursday/Saturday per usual routine     #3.  Anemia-in the setting of multiple myeloma as well as ESRD.  Continue JOSE     #4.  Multiple myeloma-he has been on chemotherapy and follows with hematology     #5.  Hypertension-blood pressure is stable and he will continue his current antihypertensive regimen consisting of amlodipine, losartan         1/8 ID  COVID     History of Present Illness:  Sarika Rob is a a(n) 83 year old male, last recorded covid vaccination 11/2021 ( > 2 years ago with original variant), and was not vaccinated with this  years vaccine.  He presented to ED on 1/7 co cough, body aches, starting 1/6.  He has underlying ESRD,  multiple myeloma.  Low grade  temp 99.2 in ED, no sob or hypoxia    ASSESSMENT/PLAN:  1. COVID  -last recorded vaccination > 2 years ago with original vaccine, no record of vaccination with this years components   -underlying ESRD and myeloma (was on oral  chemo)     Symptom onset 1 -2 days prior to admission  Mild/moderate symptoms     Was started on Remdesivir for mild/moderate symptoms and risk factors for complications and not taking paxlovid due to ESRD     Option to complete 3 days of Remdesivir or if otherwise ready for dc can complete 5 days of molnupirivir        1/9 ID  Antibiotics: Remdesivir #2     Lab 01/08/24  0913   RBC 2.45*   HGB 8.6*   HCT 25.4*   .7*   MCH 35.1*   MCHC 33.9   RDW 13.8   NEPRELIM 3.32   WBC 4.6   PLT 83.0*                 Recent Labs   Lab 01/07/24  2208 01/08/24  0913   * 121*   BUN 61* 68*   CREATSERUM 7.42* 8.20*   CA 9.0 8.4*   ALB 3.6 3.0*    139   K 4.4 4.2    103   CO2 35.0* 27.0   ALKPHO 73 59   AST 12* 10*   ALT 20 17   BILT 0.4 0.4   TP 7.3 6.1*     ASSESSMENT/PLAN:  1. COVID  -last recorded vaccination > 2 years ago with original vaccine, no record of vaccination with this years components   -underlying ESRD and myeloma (was on oral chemo)     Symptom onset 1 -2 days prior to admission  Mild/moderate symptoms     Received 2 days of Remdesivir  Symptoms improving, discharge planning for today after dialysis. Can dc ceftriaxone      MEDICATIONS ADMINISTERED IN LAST 1 DAY:  azithromycin (Zithromax) 500 mg in sodium chloride 0.9% 250mL IVPB premix  Dose: 500 mg  Freq: Every 24 hours Route: IV  Last Dose: 500 mg (01/08/24 0354)  Start: 01/08/24 0300 End: 01/08/24 1120   Admin Instructions:   (Pharmacist may adjust total duration to 3 days if prior doses received.)    Consider alternative antibiotic if baseline QTc >500 ms   Order specific questions:        0354 SD-New Bag   1120-D/C'd        cefTRIAXone (Rocephin) 1 g in D5W 100 mL IVPB-ADD  Dose: 1 g  Freq: Every 24 hours Route: IV  Last Dose: 1 g (01/09/24 0204)  Start: 01/08/24 0145 End: 01/09/24 1251   Admin Instructions:   (Pharmacist may adjust total duration to 5 days if prior doses received.)  Ceftriaxone must NOT be administered simultaneously  with calcium containing IV solutions. Includes Y-site as well.  In patients other than neonates ceftriaxone and calcium containing products may administered sequentially, provided the line is flushed in between administrations.   Order specific questions:        0301 SD-New Bag        0204 MN-New Bag   1251-D/C'd         cefuroxime (Ceftin) tab 500 mg       Date Action Dose Route User    Discharged on 1/9/2024 1/9/2024 1350 Given 500 mg Oral Leslie Fan RN          epoetin armando (Epogen, Procrit) 91658 UNIT/ML injection 10,000 Units       Date Action Dose Route User    Discharged on 1/9/2024 1/9/2024 1350 Given 10,000 Units Intravenous Leslie Fan RN     guaiFENesin ER (Mucinex) 12 hr tab 600 mg       Date Action Dose Route User    Discharged on 1/9/2024 1/9/2024 0836 Given 600 mg Oral Leslie Fan RN         Vitals (last day) before discharge       Date/Time Temp Pulse Resp BP SpO2 Weight O2 Device O2 Flow Rate (L/min) [facility]    01/09/24 1132 97.7 °F (36.5 °C) -- 18 117/53 -- -- None (Room air) -- GB    01/09/24 0715 97.7 °F (36.5 °C) -- 18 129/58 -- -- None (Room air) -- GB    01/09/24 0542 98 °F (36.7 °C) 57 18 128/58 97 % -- None (Room air) -- AW    01/09/24 0006 98.2 °F (36.8 °C) 62 17 131/55 96 % -- None (Room air) -- AW    01/08/24 2032 98.2 °F (36.8 °C) 67 18 133/58 98 % -- None (Room air) -- AW    01/08/24 1455 98.4 °F (36.9 °C) -- 18 129/54 -- -- None (Room air) -- GB    01/08/24 0943 -- 67 -- -- -- -- -- -- AK    01/08/24 0900 -- -- -- -- -- -- None (Room air) -- GQ    01/08/24 0741 97.8 °F (36.6 °C) -- 18 123/59 -- -- None (Room air) -- GB    01/08/24 0602 97.9 °F (36.6 °C) 62 16 115/55 96 % -- None (Room air) -- AN    01/08/24 0128 97.8 °F (36.6 °C) 71 18 136/59 96 % 164 lb 8 oz None (Room air) -- AN    01/08/24 0100 -- 67 17 122/57 97 % -- None (Room air) -- EM             --------------  DISCHARGE REVIEW    Payor: [facility]  Subscriber #:   [id number]  Authorization Number: [id number]    Admit date: 24  Admit time:   1:10 AM  Discharge Date: 2024  5:43 PM     Admitting Physician: Jose Paz MD  Attending Physician:  No att. providers found  Primary Care Physician: Baltazar Baker MD          Discharge Summary Notes        Discharge Summary signed by Parisa Vargas DO at 2024  1:56 PM       Author: Parisa Vargas DO Specialty: HOSPITALIST, Internal Medicine Author Type: Physician    Filed: 2024  1:56 PM Date of Service: 2024  1:52 PM Status: Signed    : Parisa Vargas DO (Physician)           [facility]     Sarika Rob Patient Status:  Inpatient    10/3/1940 MRN [id number]   [facility] 5NW-A Attending Parisa Vargas DO   Hosp Day # 1 PCP Baltazar Baker MD     Date of Admission: 2024  Date of Discharge:   2024    Discharge Disposition: Home or Self Care    Discharge Diagnosis:  #COVID-19 with superimposed bacterial PNA  -not a candidate for paxlovid w/ renal failure  -ID consulted in ED, recommended admission for RDV due to high risk  -no significant hypoxia noted  -antibx CAP coverage  -Remdesevir per ID     #ESRD on HD  -HD per nephrology on consult      #MM   -on Revlimid     #Essential HTN  -Amlodipine, Losartan     #Chronic Anemia/Thrombocytopenia, history of Multiple Myeloma   -monitor counts     #Anxiety/Depression  -SSRI    History of Present Illness:      Sarika Rob is a 83 year old male with PMH ESRD on HD, DM2, HTN, MM on oral chemo. 1 day of cough, congestion, aches. Had covid last year requiring hospitalization. C/o mild SOB and fatigue. Denies cp, diarrhea. Last HD yesterday.     Brief Synopsis: Patient was admitted, treated with remdesivir for 3 days, symptomatically improved, also treated with antibiotics for pneumonia, to complete course upon discharge, patient being discharged home after dialysis.    Lace+ Score: 78  59-90 High Risk  29-58 Medium  Risk  0-28   Low Risk       TCM Follow-Up Recommendation:  LACE > 58: High Risk of readmission after discharge from the hospital.      Procedures during hospitalization:   Regular HD    Incidental or significant findings and recommendations (brief descriptions):  N/a    Lab/Test results pending at Discharge:   N/a    Consultants:  Nephrology, ID    Discharge Medication List:     Discharge Medications        START taking these medications        Instructions Prescription details   cefuroxime 500 MG Tabs  Commonly known as: Ceftin  Start taking on: January 11, 2024      Take 1 tablet (500 mg total) by mouth every other day for 3 doses. On Dialysis days, take AFTER Hemodialysis   Stop taking on: January 16, 2024  Quantity: 3 tablet  Refills: 0            CHANGE how you take these medications        Instructions Prescription details   Lenalidomide 5 MG Caps  What changed:   how much to take  how to take this  when to take this  additional instructions      TAKE 1 CAPSULE DAILY FOR 14 DAYS OF A 28 DAY CYCLE   Quantity: 14 capsule  Refills: 0            CONTINUE taking these medications        Instructions Prescription details   albuterol 108 (90 Base) MCG/ACT Aers  Commonly known as: Ventolin HFA      Inhale 2 puffs into the lungs every 6 (six) hours as needed for Wheezing.   Quantity: 1 each  Refills: 0     amLODIPine 10 MG Tabs  Commonly known as: Norvasc      Take 1 tablet (10 mg total) by mouth daily.   Quantity: 90 tablet  Refills: 1     BABY ASPIRIN OR      Take 81 mg by mouth daily.   Refills: 0     calcium acetate 667 MG Caps  Commonly known as: Phoslo      Take 2 capsules (1,334 mg total) by mouth 3 (three) times daily.   Quantity: 180 capsule  Refills: 0     cinacalcet 30 MG Tabs  Commonly known as: Sensipar      Take 1 tablet (30 mg total) by mouth daily with breakfast. T, Th, Sat   Refills: 0     Dexcom G6 Transmitter Misc      1 each As Directed. Use with Dexcom G6 sensor. 1 every 90 days.   Quantity: 1  each  Refills: 0     Dexcom G7 Sensor Misc      1 each Every 10 days.   Quantity: 10 each  Refills: 1     docusate sodium 100 MG Caps  Commonly known as: Colace      1 capsule (100 mg total) 2 (two) times daily. PRN   Refills: 0     escitalopram 20 MG Tabs  Commonly known as: Lexapro      20 mg. 1 tablet   Quantity: 90 tablet  Refills: 1     folic acid 1 MG Tabs  Commonly known as: Folvite      Take 1 tablet (1 mg total) by mouth daily.   Quantity: 90 tablet  Refills: 0     HumaLOG Mix 75/25 KwikPen (75-25) 100 UNIT/ML Supn  Generic drug: Insulin Lispro Prot & Lispro      Inject 5 Units into the skin in the morning and 5 Units before bedtime. Per sliding scale.   Refills: 0     hydrALAZINE 10 MG Tabs  Commonly known as: Apresoline      Take 1 tablet (10 mg total) by mouth 3 (three) times daily as needed (if  or grater). Give 10-20 mg  Give 50 mg if  or above   Refills: 0     HYDROcodone-acetaminophen  MG Tabs  Commonly known as: Norco      Take 1 tablet by mouth every 8 (eight) hours as needed for Pain.   Quantity: 30 tablet  Refills: 0     LATANOPROST OP      Apply 1 drop to eye at bedtime. One drop to each eye nightly   Refills: 0     losartan 100 MG Tabs  Commonly known as: Cozaar      Take 1 tablet (100 mg total) by mouth daily.   Quantity: 90 tablet  Refills: 1     Melatonin 5 MG Tabs      Take 1 tablet (5 mg total) by mouth nightly.   Refills: 0     OneTouch Delica Lancets 33G Misc      Test once daily   Quantity: 100 each  Refills: 3     OneTouch Verio Strp      1 each by Other route daily.   Quantity: 100 strip  Refills: 1     OneTouch Verio w/Device Kit      1 kit by Does not apply route as needed.   Quantity: 1 kit  Refills: 0     pregabalin 25 MG Caps  Commonly known as: Lyrica      Take 1 capsule (25 mg total) by mouth 3 (three) times daily.   Quantity: 90 capsule  Refills: 2     rOPINIRole 1 MG Tabs  Commonly known as: Requip      Take 1 tablet (1 mg total) by mouth nightly.    Quantity: 90 tablet  Refills: 0     rosuvastatin 10 MG Tabs  Commonly known as: Crestor      Take 1 tablet (10 mg total) by mouth nightly.   Quantity: 90 tablet  Refills: 1     TRUEplus 5-Bevel Pen Needles 32G X 4 MM Misc  Generic drug: Insulin Pen Needle      Test 2 times daily.   Quantity: 200 each  Refills: 3     TRUEplus Pen Needles 32G X 4 MM Misc  Generic drug: Insulin Pen Needle      1 each by other route in the morning and 1 each before bedtime   Quantity: 200 each  Refills: 3               Where to Get Your Medications        These medications were sent to [facility] [address] [place] [address] [phone], [phone]  [address] [place] [id number]      Phone: [phone]   cefuroxime 500 MG Tabs         [place] reviewed[place] n/a    Follow-up appointment:   No follow-up provider specified.  Appointments for Next 30 Days 1/9/2024 - 2/8/2024        Date Arrival Time Visit Type Length Department Provider     1/24/2024 10:30 AM  FOLLOW UP-HEM/ONC [4621] 15 min [facility] [place] Issa Hanna MD    Patient Instructions:         Location Instructions:     **IF YOU NEED LABWORK OR AN INFUSION ALONG WITH YOUR APPOINTMENT, YOU MUST CALL TO SCHEDULE.**  Your appointment is on the [facility] in the [facility]. The address is [address] [place] [address]. Please register at the [facility]  on the second floor.  Masks are optional for all patients and visitors, unless otherwise indicated.                      Vital signs:  Temp:  [97.7 °F (36.5 °C)-98.4 °F (36.9 °C)] 97.7 °F (36.5 °C)  Pulse:  [57-67] 57  Resp:  [17-18] 18  BP: (117-133)/(53-58) 117/53  SpO2:  [96 %-98 %] 97 %    Physical Exam:    General: No acute distress   Lungs: clear to auscultation  Cardiovascular: S1, S2  Abdomen: Soft      -----------------------------------------------------------------------------------------------  PATIENT DISCHARGE INSTRUCTIONS: See  electronic chart    Parisa Vargas DO    Total time spent on discharge plannin minutes     The  [employer] Cures Act makes medical notes like these available to patients in the interest of transparency. Please be advised this is a medical document. Medical documents are intended to carry relevant information, facts as evident, and the clinical opinion of the practitioner. The medical note is intended as peer to peer communication and may appear blunt or direct. It is written in medical language and may contain abbreviations or verbiage that are unfamiliar.       Electronically signed by Parisa Vargas DO on 2024  1:56 PM         REVIEWER COMMENTS

## 2024-01-10 NOTE — PROGRESS NOTES
Left message on mailbox for pt's dtrJaky (DAMARIS verified) to call [facility] back for HFU.  [facility] contact information [phone] included in message.        Discharge Dx:   #COVID-19 (+1/07/2024) with superimposed bacterial PNA

## 2024-01-11 ENCOUNTER — TELEPHONE (OUTPATIENT)
Dept: FAMILY MEDICINE CLINIC | Facility: CLINIC | Age: 84
End: 2024-01-11

## 2024-01-11 NOTE — TELEPHONE ENCOUNTER
Kristy from [facility] health calling stating she saw the pt today who also had dialysis. Will be seeing pt 1x week for 2 weeks and then once a week every other week. PT will be going in next week.     Daughters went to go  cefuroxime (Ceftin) tab 500 mg but it had been cancelled.     They want to know if pt should be taking this medication or not. They are not sure why it was cancelled. Pt has been having a low grade fever of 99.2. please advise

## 2024-01-12 NOTE — TELEPHONE ENCOUNTER
See MCM.  Daughter notified that script has been sent for antibiotic.    Routing to Dr. Baker for fyi regarding Home health POC

## 2024-01-17 ENCOUNTER — MED REC SCAN ONLY (OUTPATIENT)
Dept: FAMILY MEDICINE CLINIC | Facility: CLINIC | Age: 84
End: 2024-01-17

## 2024-01-18 ENCOUNTER — HOME HEALTH CHARGES (OUTPATIENT)
Dept: FAMILY MEDICINE CLINIC | Facility: CLINIC | Age: 84
End: 2024-01-18

## 2024-01-18 DIAGNOSIS — U07.1 PNEUMONIA DUE TO 2019-NCOV: ICD-10-CM

## 2024-01-18 DIAGNOSIS — J12.82 PNEUMONIA DUE TO 2019-NCOV: ICD-10-CM

## 2024-01-18 DIAGNOSIS — U07.1 INFECTION DUE TO 2019-NCOV: Primary | ICD-10-CM

## 2024-01-23 NOTE — PROGRESS NOTES
[facility] Note    Diagnosis: Multiple Myeloma     Treatment History: Currently on Revlimid maintenance     Visit Diagnosis:  1. Multiple myeloma not having achieved remission (HCC)        History of Present Illness: 82M with a PMH ESRD on HD, HTN, DM2 HLD, PVD and multiple myeloma presenting to establish care for his myeloma. He is on revlimid maintenance: Revlimid 5 mg D1-14 q28 days. He takes his Revlimid after HD on HD days.     Hematology History  Family notes that he was initially diagnosed with multiple myeloma around 2017. He was following with Dr. Rimma Jo. Unclear on staging, cytogenetics, bone marrow results.     Per notes, was initially treated in [place], [place] with RVD --> VCD and was later transitioned to Revlimid 5 mg D1-14 q28 days. He had been following with Dr. Moore at [facility] since 10/2020. Due to insurance coverage, he switched to [place].     I met with the patients for the first time on 2/15/23. Above history was reviewed. He is doing well overall. He is on HD T/Th/S. He is due to start next revlimid on 2/19/23. He has this rx on hand. He feels well overall. He has no major complaints. Denies any bone pain. No fevers, night sweats or weight loss. Here with his daughter.     SPEP from 2/15/23: No M spike. [place] LC 43, Lambda LC 21, Ratio 2.01  SPEP from 3/22/23: No M spike. [place] LC 32, Lambda LC 20.6, Ratio 1.58  SPEP from 5/3/23: No M spike. [place] LC 42, Lambda LC 23, Ratio 1.83  SPEP from 09/02/23: No M spike, [place] LC 40, Lambda LC 22, Ratio 1.79    Was admitted from 11/24/24-11/28/23 for a clogged AVF.    SPEP from 12/2023: No M spike, [place] LC 26, Lambda LC 17, Ratio 1.5    Interval History:   -Doing ok overall. Recently diagnosed with COVID  -Energy is ok  -Will start new revlimid cycle tomorrow     Review of Systems: 12 Point ROS was completed and pertinent positives are in the HPI    Current Outpatient Medications on File Prior to Visit   Medication Sig  Dispense Refill    benzonatate 200 MG Oral Cap Take 1 capsule (200 mg total) by mouth 3 (three) times daily as needed for cough. 30 capsule 0    albuterol 108 (90 Base) MCG/ACT Inhalation Aero Soln Inhale 2 puffs into the lungs every 6 (six) hours as needed for Wheezing. 1 each 0    cefuroxime 500 MG Oral Tab Take 1 tablet (500 mg total) by mouth every other day for 3 doses. On Dialysis days, take AFTER Hemodialysis 3 tablet 0    Insulin Lispro Prot & Lispro (HUMALOG MIX 75/25 KWIKPEN) (75-25) 100 UNIT/ML SC SUPN Inject 5 Units into the skin in the morning and 5 Units before bedtime. Per sliding scale.      Lenalidomide 5 MG Oral Cap TAKE 1 CAPSULE DAILY FOR 14 DAYS OF A 28 DAY CYCLE (Patient taking differently: Take 1 tablet by mouth daily. TAKE 1 CAPSULE DAILY FOR 14 DAYS OF A 28 DAY CYCLE (started 12/29/2023)) 14 capsule 0    folic acid 1 MG Oral Tab Take 1 tablet (1 mg total) by mouth daily. 90 tablet 0    rOPINIRole 1 MG Oral Tab Take 1 tablet (1 mg total) by mouth nightly. 90 tablet 0    hydrALAZINE 10 MG Oral Tab Take 1 tablet (10 mg total) by mouth 3 (three) times daily as needed (if  or grater). Give 10-20 mg  Give 50 mg if  or above      Melatonin 5 MG Oral Tab Take 1 tablet (5 mg total) by mouth nightly.      pregabalin 25 MG Oral Cap Take 1 capsule (25 mg total) by mouth 3 (three) times daily. 90 capsule 2    Insulin Pen Needle (TRUEPLUS PEN NEEDLES) 32G X 4 MM Does not apply Misc 1 each by other route in the morning and 1 each before bedtime 200 each 3    Continuous Blood Gluc Sensor (DEXCOM G7 SENSOR) Does not apply Misc 1 each Every 10 days. 10 each 1    Glucose Blood (ONETOUCH VERIO) In Vitro Strip 1 each by Other route daily. 100 strip 1    amLODIPine 10 MG Oral Tab Take 1 tablet (10 mg total) by mouth daily. 90 tablet 1    losartan 100 MG Oral Tab Take 1 tablet (100 mg total) by mouth daily. 90 tablet 1    rosuvastatin 10 MG Oral Tab Take 1 tablet (10 mg total) by mouth nightly. 90  tablet 1    escitalopram 20 MG Oral Tab 20 mg. 1 tablet 90 tablet 1    Continuous Blood Gluc Transmit (DEXCOM G6 TRANSMITTER) Does not apply Misc 1 each As Directed. Use with Dexcom G6 sensor. 1 every 90 days. 1 each 0    calcium acetate 667 MG Oral Cap Take 2 capsules (1,334 mg total) by mouth 3 (three) times daily. 180 capsule 0    HYDROcodone-acetaminophen (NORCO)  MG Oral Tab Take 1 tablet by mouth every 8 (eight) hours as needed for Pain. 30 tablet 0    LATANOPROST OP Apply 1 drop to eye at bedtime. One drop to each eye nightly      cinacalcet 30 MG Oral Tab Take 1 tablet (30 mg total) by mouth daily with breakfast. T, Th, Sat      Insulin Pen Needle (TRUEPLUS 5-BEVEL PEN NEEDLES) 32G X 4 MM Does not apply Misc Test 2 times daily. 200 each 3    OneTouch Delica Lancets 33G Does not apply Misc Test once daily 100 each 3    Blood Glucose Monitoring Suppl (ONETOUCH VERIO) w/Device Does not apply Kit 1 kit by Does not apply route as needed. 1 kit 0    BABY ASPIRIN OR Take 81 mg by mouth daily.      docusate sodium 100 MG Oral Cap 1 capsule (100 mg total) 2 (two) times daily. PRN       Current Facility-Administered Medications on File Prior to Visit   Medication Dose Route Frequency Provider Last Rate Last Admin    [COMPLETED] epoetin armando (Epogen, Procrit) 13500 UNIT/ML injection 10,000 Units  10,000 Units Intravenous Once in dialysis Luis Angel Menon MD   10,000 Units at 24 1350    [] sodium chloride 0.9 % IV bolus 100 mL  100 mL Intravenous Q30 Min PRN Luis Angel Menon MD        And    [] albumin human (Albumin) 25% injection 25 g  25 g Intravenous PRN Dialysis Luis Angel Menon MD        [COMPLETED] remdesivir (Veklury) 200 mg in sodium chloride 0.9% 100 mL IVPB  200 mg Intravenous Once Baylee Hooker MD   Stopped at 24 2340     Past Medical History:   Diagnosis Date    Chronic kidney disease     stage 5    Diabetes (HCC)     ESRD (end stage renal disease) (HCC)     Essential  hypertension     Glaucoma     High blood pressure     High cholesterol     History of blood transfusion     Multiple myeloma (HCC)     Myeloma (HCC)     Peripheral vascular disease (HCC)     Renal disorder      Past Surgical History:   Procedure Laterality Date    APPENDECTOMY      AV FISTULA REVISION, OPEN      OTHER      dialysis     Social History     Socioeconomic History    Marital status:    Tobacco Use    Smoking status: Never    Smokeless tobacco: Never   Vaping Use    Vaping Use: Never used   Substance and Sexual Activity    Alcohol use: Never    Drug use: Never      Family History   Problem Relation Age of Onset    Hypertension Father     Diabetes Father     Stroke Mother     Heart Disorder Mother        Physical Exam  Height: 175.3 cm (5' 9.02\") (01/24 1008)  Weight: 73.9 kg (163 lb) (01/24 1008)  BSA (Calculated - sq m): 1.89 sq meters (01/24 1008)  Pulse: 70 (01/24 1008)  BP: 137/53 (01/24 1008)  Temp: 98 °F (36.7 °C) (01/24 1008)  Do Not Use - Resp Rate: --  SpO2: 95 % (01/24 1008)     General: NAD, AOX3  HEENT: clear op, mmm, no jvd, no scleral icterus  CV: RRR S1S2  Lungs CTAB   Extremities: No edema   Lungs:  no increased work of breathing  Neuro: CN: II-XII grossly intact    Results:  Lab Results   Component Value Date    WBC 4.6 01/09/2024    HGB 9.0 (L) 01/09/2024    HCT 26.7 (L) 01/09/2024    .3 (H) 01/09/2024    PLT 87.0 (L) 01/09/2024     Lab Results   Component Value Date     01/08/2024    K 4.2 01/08/2024    CO2 27.0 01/08/2024     01/08/2024    BUN 68 (H) 01/08/2024    PHOS 3.9 01/09/2024    ALB 3.0 (L) 01/08/2024       Lab Results   Component Value Date     05/06/2022       Radiology: bone survey-negative     CT CAP 10/2023  1. Abnormal appearance of the osseous structures consistent with multiple myeloma involvement.  Mild anterior wedge deformity of T10 may be secondary to pathologic compression injury, temporally indeterminate.  No discrete fracture  lines.   2. Cholelithiasis.   3. Details as above.  Continued clinical correlation recommended.     Pathology: OSH records reviewed     Assessment and Plan:  Multiple Myeloma   -Unclear on details of previous treatment in [place]  -Continue maintenance Revlimid 5 mg D1-14 q28 days. Takes revlimid after HD on HD Days   -ASA 81 mg daily  -Monthly CBC, CMP, SPEP    Bone health: avoid zometa. Skeletal survey was negative. Will start Xgeva if he develops osseous lesions.     Anemia: EPO with HD. Check Fe studies.     ESRD: on HD    HTN/DM2: per PCP    Neuropathy: following with Neurology. On Lyrica     RTC in 3 months     [facility]

## 2024-01-24 ENCOUNTER — OFFICE VISIT (OUTPATIENT)
Dept: HEMATOLOGY/ONCOLOGY | Facility: HOSPITAL | Age: 84
End: 2024-01-24
Attending: INTERNAL MEDICINE
Payer: MEDICAID

## 2024-01-24 VITALS
RESPIRATION RATE: 16 BRPM | WEIGHT: 163 LBS | HEART RATE: 70 BPM | BODY MASS INDEX: 24.14 KG/M2 | SYSTOLIC BLOOD PRESSURE: 137 MMHG | HEIGHT: 69.02 IN | TEMPERATURE: 98 F | DIASTOLIC BLOOD PRESSURE: 53 MMHG | OXYGEN SATURATION: 95 %

## 2024-01-24 DIAGNOSIS — C90.00 MULTIPLE MYELOMA NOT HAVING ACHIEVED REMISSION (HCC): Primary | ICD-10-CM

## 2024-01-24 LAB
ALBUMIN SERPL-MCNC: 3.6 G/DL (ref 3.4–5)
ALBUMIN/GLOB SERPL: 1 {RATIO} (ref 1–2)
ALP LIVER SERPL-CCNC: 76 U/L
ANION GAP SERPL CALC-SCNC: 5 MMOL/L (ref 0–18)
AST SERPL-CCNC: 13 U/L (ref 15–37)
BASOPHILS # BLD AUTO: 0.03 X10(3) UL (ref 0–0.2)
BASOPHILS NFR BLD AUTO: 1.2 %
BILIRUB SERPL-MCNC: 0.6 MG/DL (ref 0.1–2)
BUN BLD-MCNC: 46 MG/DL (ref 9–23)
CALCIUM BLD-MCNC: 8.6 MG/DL (ref 8.5–10.1)
CHLORIDE SERPL-SCNC: 104 MMOL/L (ref 98–112)
CO2 SERPL-SCNC: 31 MMOL/L (ref 21–32)
CREAT BLD-MCNC: 6 MG/DL
DEPRECATED HBV CORE AB SER IA-ACNC: 840 NG/ML
EGFRCR SERPLBLD CKD-EPI 2021: 9 ML/MIN/1.73M2 (ref 60–?)
EOSINOPHIL # BLD AUTO: 0.07 X10(3) UL (ref 0–0.7)
EOSINOPHIL NFR BLD AUTO: 2.8 %
ERYTHROCYTE [DISTWIDTH] IN BLOOD BY AUTOMATED COUNT: 15.7 %
GLOBULIN PLAS-MCNC: 3.5 G/DL (ref 2.8–4.4)
GLUCOSE BLD-MCNC: 207 MG/DL (ref 70–99)
HCT VFR BLD AUTO: 31.5 %
HGB BLD-MCNC: 10.6 G/DL
IMM GRANULOCYTES # BLD AUTO: 0 X10(3) UL (ref 0–1)
IMM GRANULOCYTES NFR BLD: 0 %
IRON SATN MFR SERPL: 27 %
IRON SERPL-MCNC: 80 UG/DL
LYMPHOCYTES # BLD AUTO: 1.19 X10(3) UL (ref 1–4)
LYMPHOCYTES NFR BLD AUTO: 47.8 %
MCH RBC QN AUTO: 34.6 PG (ref 26–34)
MCHC RBC AUTO-ENTMCNC: 33.7 G/DL (ref 31–37)
MCV RBC AUTO: 102.9 FL
MONOCYTES # BLD AUTO: 0.14 X10(3) UL (ref 0.1–1)
MONOCYTES NFR BLD AUTO: 5.6 %
NEUTROPHILS # BLD AUTO: 1.06 X10 (3) UL (ref 1.5–7.7)
NEUTROPHILS # BLD AUTO: 1.06 X10(3) UL (ref 1.5–7.7)
NEUTROPHILS NFR BLD AUTO: 42.6 %
OSMOLALITY SERPL CALC.SUM OF ELEC: 308 MOSM/KG (ref 275–295)
PLATELET # BLD AUTO: 91 10(3)UL (ref 150–450)
POTASSIUM SERPL-SCNC: 4.5 MMOL/L (ref 3.5–5.1)
PROT SERPL-MCNC: 7.1 G/DL (ref 6.4–8.2)
RBC # BLD AUTO: 3.06 X10(6)UL
SODIUM SERPL-SCNC: 140 MMOL/L (ref 136–145)
TIBC SERPL-MCNC: 292 UG/DL (ref 240–450)
TRANSFERRIN SERPL-MCNC: 196 MG/DL (ref 200–360)
WBC # BLD AUTO: 2.5 X10(3) UL (ref 4–11)

## 2024-01-24 PROCEDURE — 99213 OFFICE O/P EST LOW 20 MIN: CPT | Performed by: INTERNAL MEDICINE

## 2024-01-24 RX ORDER — LENALIDOMIDE 5 MG/1
CAPSULE ORAL
Qty: 14 CAPSULE | Refills: 0 | Status: SHIPPED | OUTPATIENT
Start: 2024-01-24

## 2024-01-25 LAB
ALBUMIN SERPL ELPH-MCNC: 4.2 G/DL (ref 3.75–5.21)
ALBUMIN/GLOB SERPL: 1.61 {RATIO} (ref 1–2)
ALPHA1 GLOB SERPL ELPH-MCNC: 0.27 G/DL (ref 0.19–0.46)
ALPHA2 GLOB SERPL ELPH-MCNC: 0.58 G/DL (ref 0.48–1.05)
B-GLOBULIN SERPL ELPH-MCNC: 0.7 G/DL (ref 0.68–1.23)
GAMMA GLOB SERPL ELPH-MCNC: 1.05 G/DL (ref 0.62–1.7)
KAPPA LC FREE SER-MCNC: 31.29 MG/DL (ref 0.33–1.94)
KAPPA LC FREE/LAMBDA FREE SER NEPH: 1.5 {RATIO} (ref 0.26–1.65)
LAMBDA LC FREE SERPL-MCNC: 20.82 MG/DL (ref 0.57–2.63)
PROT SERPL-MCNC: 6.8 G/DL (ref 5.7–8.2)

## 2024-02-07 ENCOUNTER — HOSPITAL ENCOUNTER (OUTPATIENT)
Facility: HOSPITAL | Age: 84
Setting detail: OBSERVATION
LOS: 1 days | Discharge: HOME | End: 2024-02-08
Attending: EMERGENCY MEDICINE | Admitting: INTERNAL MEDICINE
Payer: MEDICAID

## 2024-02-07 ENCOUNTER — APPOINTMENT (OUTPATIENT)
Dept: INTERVENTIONAL RADIOLOGY/VASCULAR | Facility: HOSPITAL | Age: 84
End: 2024-02-07
Attending: INTERNAL MEDICINE
Payer: MEDICAID

## 2024-02-07 DIAGNOSIS — T82.49XS CLOTTED DIALYSIS ACCESS, SEQUELA (HCC): ICD-10-CM

## 2024-02-07 DIAGNOSIS — D63.8 ANEMIA OF CHRONIC DISEASE: ICD-10-CM

## 2024-02-07 DIAGNOSIS — E87.5 HYPERKALEMIA: ICD-10-CM

## 2024-02-07 DIAGNOSIS — N19 UREMIA: ICD-10-CM

## 2024-02-07 DIAGNOSIS — N18.6 ESRD (END STAGE RENAL DISEASE) (HCC): Primary | ICD-10-CM

## 2024-02-07 DIAGNOSIS — D69.6 THROMBOCYTOPENIA (HCC): ICD-10-CM

## 2024-02-07 PROBLEM — T82.49XA CLOTTED DIALYSIS ACCESS (HCC): Status: ACTIVE | Noted: 2024-02-07

## 2024-02-07 PROBLEM — T82.49XA CLOTTED DIALYSIS ACCESS: Status: ACTIVE | Noted: 2024-02-07

## 2024-02-07 LAB
ALBUMIN SERPL-MCNC: 4 G/DL (ref 3.4–5)
ALBUMIN/GLOB SERPL: 1.2 {RATIO} (ref 1–2)
ALP LIVER SERPL-CCNC: 86 U/L
ALT SERPL-CCNC: 15 U/L
ANION GAP SERPL CALC-SCNC: 8 MMOL/L (ref 0–18)
APTT PPP: 29.9 SECONDS (ref 23.3–35.6)
AST SERPL-CCNC: 11 U/L (ref 15–37)
BASOPHILS # BLD AUTO: 0.01 X10(3) UL (ref 0–0.2)
BASOPHILS NFR BLD AUTO: 0.2 %
BILIRUB SERPL-MCNC: 0.6 MG/DL (ref 0.1–2)
BUN BLD-MCNC: 126 MG/DL (ref 9–23)
CALCIUM BLD-MCNC: 8.9 MG/DL (ref 8.5–10.1)
CHLORIDE SERPL-SCNC: 102 MMOL/L (ref 98–112)
CO2 SERPL-SCNC: 26 MMOL/L (ref 21–32)
CREAT BLD-MCNC: 11.1 MG/DL
EGFRCR SERPLBLD CKD-EPI 2021: 4 ML/MIN/1.73M2 (ref 60–?)
EOSINOPHIL # BLD AUTO: 0.27 X10(3) UL (ref 0–0.7)
EOSINOPHIL NFR BLD AUTO: 6 %
ERYTHROCYTE [DISTWIDTH] IN BLOOD BY AUTOMATED COUNT: 15.9 %
GLOBULIN PLAS-MCNC: 3.3 G/DL (ref 2.8–4.4)
GLUCOSE BLD-MCNC: 106 MG/DL (ref 70–99)
GLUCOSE BLD-MCNC: 119 MG/DL (ref 70–99)
GLUCOSE BLD-MCNC: 155 MG/DL (ref 70–99)
HCT VFR BLD AUTO: 31.5 %
HGB BLD-MCNC: 10.9 G/DL
IMM GRANULOCYTES # BLD AUTO: 0.03 X10(3) UL (ref 0–1)
IMM GRANULOCYTES NFR BLD: 0.7 %
INR BLD: 1.04 (ref 0.8–1.2)
LYMPHOCYTES # BLD AUTO: 1.38 X10(3) UL (ref 1–4)
LYMPHOCYTES NFR BLD AUTO: 30.8 %
MCH RBC QN AUTO: 35.5 PG (ref 26–34)
MCHC RBC AUTO-ENTMCNC: 34.6 G/DL (ref 31–37)
MCV RBC AUTO: 102.6 FL
MONOCYTES # BLD AUTO: 0.24 X10(3) UL (ref 0.1–1)
MONOCYTES NFR BLD AUTO: 5.4 %
NEUTROPHILS # BLD AUTO: 2.55 X10 (3) UL (ref 1.5–7.7)
NEUTROPHILS # BLD AUTO: 2.55 X10(3) UL (ref 1.5–7.7)
NEUTROPHILS NFR BLD AUTO: 56.9 %
OSMOLALITY SERPL CALC.SUM OF ELEC: 324 MOSM/KG (ref 275–295)
PLATELET # BLD AUTO: 62 10(3)UL (ref 150–450)
POTASSIUM SERPL-SCNC: 5.3 MMOL/L (ref 3.5–5.1)
PROT SERPL-MCNC: 7.3 G/DL (ref 6.4–8.2)
PROTHROMBIN TIME: 13.6 SECONDS (ref 11.6–14.8)
RBC # BLD AUTO: 3.07 X10(6)UL
SODIUM SERPL-SCNC: 136 MMOL/L (ref 136–145)
WBC # BLD AUTO: 4.5 X10(3) UL (ref 4–11)

## 2024-02-07 PROCEDURE — 99223 1ST HOSP IP/OBS HIGH 75: CPT | Performed by: INTERNAL MEDICINE

## 2024-02-07 PROCEDURE — 99222 1ST HOSP IP/OBS MODERATE 55: CPT | Performed by: INTERNAL MEDICINE

## 2024-02-07 PROCEDURE — 02HV33Z INSERTION OF INFUSION DEVICE INTO SUPERIOR VENA CAVA, PERCUTANEOUS APPROACH: ICD-10-PCS | Performed by: RADIOLOGY

## 2024-02-07 PROCEDURE — 5A1D70Z PERFORMANCE OF URINARY FILTRATION, INTERMITTENT, LESS THAN 6 HOURS PER DAY: ICD-10-PCS | Performed by: INTERNAL MEDICINE

## 2024-02-07 PROCEDURE — 0JH60WZ INSERTION OF TOTALLY IMPLANTABLE VASCULAR ACCESS DEVICE INTO CHEST SUBCUTANEOUS TISSUE AND FASCIA, OPEN APPROACH: ICD-10-PCS | Performed by: RADIOLOGY

## 2024-02-07 RX ORDER — MIDAZOLAM HYDROCHLORIDE 1 MG/ML
INJECTION INTRAMUSCULAR; INTRAVENOUS
Status: COMPLETED
Start: 2024-02-07 | End: 2024-02-07

## 2024-02-07 RX ORDER — FOLIC ACID 1 MG/1
1 TABLET ORAL DAILY
Status: DISCONTINUED | OUTPATIENT
Start: 2024-02-07 | End: 2024-02-08

## 2024-02-07 RX ORDER — PREGABALIN 25 MG/1
25 CAPSULE ORAL 3 TIMES DAILY
Status: DISCONTINUED | OUTPATIENT
Start: 2024-02-07 | End: 2024-02-08

## 2024-02-07 RX ORDER — LATANOPROST 50 UG/ML
1 SOLUTION/ DROPS OPHTHALMIC NIGHTLY
Status: DISCONTINUED | OUTPATIENT
Start: 2024-02-07 | End: 2024-02-08

## 2024-02-07 RX ORDER — CALCIUM ACETATE 667 MG/1
2 CAPSULE ORAL 3 TIMES DAILY
Status: DISCONTINUED | OUTPATIENT
Start: 2024-02-07 | End: 2024-02-08

## 2024-02-07 RX ORDER — NICOTINE POLACRILEX 4 MG
15 LOZENGE BUCCAL
Status: DISCONTINUED | OUTPATIENT
Start: 2024-02-07 | End: 2024-02-08

## 2024-02-07 RX ORDER — CINACALCET 30 MG/1
30 TABLET, FILM COATED ORAL
Status: DISCONTINUED | OUTPATIENT
Start: 2024-02-08 | End: 2024-02-08

## 2024-02-07 RX ORDER — BENZONATATE 100 MG/1
200 CAPSULE ORAL 3 TIMES DAILY PRN
Status: DISCONTINUED | OUTPATIENT
Start: 2024-02-07 | End: 2024-02-08

## 2024-02-07 RX ORDER — SENNOSIDES 8.6 MG
17.2 TABLET ORAL NIGHTLY PRN
Status: DISCONTINUED | OUTPATIENT
Start: 2024-02-07 | End: 2024-02-08

## 2024-02-07 RX ORDER — ACETAMINOPHEN 500 MG
500 TABLET ORAL EVERY 4 HOURS PRN
Status: DISCONTINUED | OUTPATIENT
Start: 2024-02-07 | End: 2024-02-08

## 2024-02-07 RX ORDER — ECHINACEA PURPUREA EXTRACT 125 MG
1 TABLET ORAL
Status: DISCONTINUED | OUTPATIENT
Start: 2024-02-07 | End: 2024-02-08

## 2024-02-07 RX ORDER — LOSARTAN POTASSIUM 100 MG/1
100 TABLET ORAL DAILY
Status: DISCONTINUED | OUTPATIENT
Start: 2024-02-07 | End: 2024-02-08

## 2024-02-07 RX ORDER — DEXTROSE MONOHYDRATE 25 G/50ML
50 INJECTION, SOLUTION INTRAVENOUS
Status: DISCONTINUED | OUTPATIENT
Start: 2024-02-07 | End: 2024-02-08

## 2024-02-07 RX ORDER — ONDANSETRON 2 MG/ML
4 INJECTION INTRAMUSCULAR; INTRAVENOUS EVERY 6 HOURS PRN
Status: DISCONTINUED | OUTPATIENT
Start: 2024-02-07 | End: 2024-02-08

## 2024-02-07 RX ORDER — ASPIRIN 81 MG/1
81 TABLET, CHEWABLE ORAL DAILY
Status: DISCONTINUED | OUTPATIENT
Start: 2024-02-07 | End: 2024-02-08

## 2024-02-07 RX ORDER — ALBUTEROL SULFATE 90 UG/1
2 AEROSOL, METERED RESPIRATORY (INHALATION) EVERY 6 HOURS PRN
Status: DISCONTINUED | OUTPATIENT
Start: 2024-02-07 | End: 2024-02-08

## 2024-02-07 RX ORDER — HEPARIN SODIUM 5000 [USP'U]/ML
INJECTION, SOLUTION INTRAVENOUS; SUBCUTANEOUS
Status: COMPLETED
Start: 2024-02-07 | End: 2024-02-07

## 2024-02-07 RX ORDER — METOCLOPRAMIDE HYDROCHLORIDE 5 MG/ML
5 INJECTION INTRAMUSCULAR; INTRAVENOUS EVERY 8 HOURS PRN
Status: DISCONTINUED | OUTPATIENT
Start: 2024-02-07 | End: 2024-02-08

## 2024-02-07 RX ORDER — MELATONIN
3 NIGHTLY PRN
Status: DISCONTINUED | OUTPATIENT
Start: 2024-02-07 | End: 2024-02-08

## 2024-02-07 RX ORDER — NICOTINE POLACRILEX 4 MG
30 LOZENGE BUCCAL
Status: DISCONTINUED | OUTPATIENT
Start: 2024-02-07 | End: 2024-02-08

## 2024-02-07 RX ORDER — ALBUMIN (HUMAN) 12.5 G/50ML
25 SOLUTION INTRAVENOUS
Status: DISCONTINUED | OUTPATIENT
Start: 2024-02-07 | End: 2024-02-08

## 2024-02-07 RX ORDER — HEPARIN SODIUM 1000 [USP'U]/ML
1.5 INJECTION, SOLUTION INTRAVENOUS; SUBCUTANEOUS ONCE
Status: COMPLETED | OUTPATIENT
Start: 2024-02-07 | End: 2024-02-08

## 2024-02-07 RX ORDER — AMLODIPINE BESYLATE 5 MG/1
10 TABLET ORAL DAILY
Status: DISCONTINUED | OUTPATIENT
Start: 2024-02-07 | End: 2024-02-08

## 2024-02-07 RX ORDER — ROPINIROLE 0.5 MG/1
1 TABLET, FILM COATED ORAL NIGHTLY
Status: DISCONTINUED | OUTPATIENT
Start: 2024-02-07 | End: 2024-02-08

## 2024-02-07 RX ORDER — HEPARIN SODIUM 5000 [USP'U]/ML
5000 INJECTION, SOLUTION INTRAVENOUS; SUBCUTANEOUS EVERY 12 HOURS SCHEDULED
Status: DISCONTINUED | OUTPATIENT
Start: 2024-02-07 | End: 2024-02-08

## 2024-02-07 RX ORDER — BISACODYL 10 MG
10 SUPPOSITORY, RECTAL RECTAL
Status: DISCONTINUED | OUTPATIENT
Start: 2024-02-07 | End: 2024-02-08

## 2024-02-07 RX ORDER — LIDOCAINE HYDROCHLORIDE 10 MG/ML
INJECTION, SOLUTION INFILTRATION; PERINEURAL
Status: COMPLETED
Start: 2024-02-07 | End: 2024-02-07

## 2024-02-07 RX ORDER — ESCITALOPRAM OXALATE 20 MG/1
20 TABLET ORAL EVERY MORNING
Status: DISCONTINUED | OUTPATIENT
Start: 2024-02-07 | End: 2024-02-08

## 2024-02-07 RX ORDER — POLYETHYLENE GLYCOL 3350 17 G/17G
17 POWDER, FOR SOLUTION ORAL DAILY PRN
Status: DISCONTINUED | OUTPATIENT
Start: 2024-02-07 | End: 2024-02-08

## 2024-02-07 RX ORDER — ROSUVASTATIN CALCIUM 10 MG/1
10 TABLET, COATED ORAL NIGHTLY
Status: DISCONTINUED | OUTPATIENT
Start: 2024-02-07 | End: 2024-02-08

## 2024-02-07 NOTE — H&P
[facility]     Sarika Rob Patient Status:  Inpatient    10/3/1940 MRN [id number]   Location [facility] 3NE-A Attending Rm Cardoso, DO   Hosp Day # 0 PCP Baltazar Baker MD     Chief Complaint: Clotted dialysis access    Subjective:    History of Present Illness:   Sarika Rob is a 83 year old male with PMHx ESRD on hemodialysis, multiple myeloma, diabetes mellitus type 2, hypertension, dyslipidemia and PVD who presents to the hospital due to clotted dialysis access.  He just had AVF declot last week as an outpatient.  He went to hemodialysis today and access was again noted to be clotted.  Declot was unsuccessful and CVC was attempted to be placed but was also unsuccessful so he presented to the ER.  Last hemodialysis was Saturday.  Today he had tunneled HD cath placed by IR and plan is for HD after.  Patient denies any fever, chills, chest pain or shortness of breath.  No nausea, vomiting, diarrhea, abdominal pain or dysuria.    History/Other:    Past Medical History:  Past Medical History:   Diagnosis Date    Chronic kidney disease     stage 5    Diabetes (HCC)     ESRD (end stage renal disease) (HCC)     Essential hypertension     Glaucoma     High blood pressure     High cholesterol     History of blood transfusion     Multiple myeloma (HCC)     Myeloma (HCC)     Peripheral vascular disease (HCC)     Renal disorder      Past Surgical History:   Past Surgical History:   Procedure Laterality Date    APPENDECTOMY      AV FISTULA REVISION, OPEN      OTHER      dialysis      Family History:   Family History   Problem Relation Age of Onset    Hypertension Father     Diabetes Father     Stroke Mother     Heart Disorder Mother      Social History:    reports that he has never smoked. He has never used smokeless tobacco. He reports that he does not drink alcohol and does not use drugs.     Allergies: No Known Allergies    Medications:    Current Facility-Administered Medications  on File Prior to Encounter   Medication Dose Route Frequency Provider Last Rate Last Admin    [COMPLETED] epoetin armando (Epogen, Procrit) 07191 UNIT/ML injection 10,000 Units  10,000 Units Intravenous Once in dialysis Luis Angel Menon MD   10,000 Units at 24 1350    [] sodium chloride 0.9 % IV bolus 100 mL  100 mL Intravenous Q30 Min PRN Luis Angel Menon MD        And    [] albumin human (Albumin) 25% injection 25 g  25 g Intravenous PRN Dialysis Luis Angel Menon MD        [COMPLETED] remdesivir (Veklury) 200 mg in sodium chloride 0.9% 100 mL IVPB  200 mg Intravenous Once Baylee Hooker MD   Stopped at 24 2340    [COMPLETED] patiromer (Veltassa) 8.4 g oral packet 8.4 g  8.4 g Oral Once Darrell Silva MD   8.4 g at 23 1612    [COMPLETED] lidocaine (Xylocaine) 1 % injection             [COMPLETED] fentaNYL (Sublimaze) 50 mcg/mL injection             [COMPLETED] midazolam (Versed) 2 MG/2ML injection             [COMPLETED] heparin (Porcine) 5000 UNIT/ML injection             [COMPLETED] sterile water for injection (PF) injection             [COMPLETED] alteplase (Activase) 2 mg injection             [COMPLETED] heparin (Porcine) 5000 UNIT/ML injection             [COMPLETED] iodixanol (VISIPAQUE) 320 MG/ML injection 50 mL  50 mL Injection ONCE PRN Cora Cardoso MD   40 mL at 23 1507    [COMPLETED] lidocaine (Xylocaine) 1 % injection             [COMPLETED] heparin (Porcine) 5000 UNIT/ML injection             [COMPLETED] heparin (Porcine) 1000 UNIT/ML injection 1,500 Units  1.5 mL Intracatheter Once Darrell Silva MD   1,500 Units at 23 0851     Current Outpatient Medications on File Prior to Encounter   Medication Sig Dispense Refill    Lenalidomide 5 MG Oral Cap TAKE 1 CAPSULE DAILY FOR 14 DAYS OF A 28 DAY CYCLE 14 capsule 0    benzonatate 200 MG Oral Cap Take 1 capsule (200 mg total) by mouth 3 (three) times daily as needed for cough. 30 capsule 0    albuterol 108 (90  Base) MCG/ACT Inhalation Aero Soln Inhale 2 puffs into the lungs every 6 (six) hours as needed for Wheezing. 1 each 0    cefuroxime 500 MG Oral Tab Take 1 tablet (500 mg total) by mouth every other day for 3 doses. On Dialysis days, take AFTER Hemodialysis 3 tablet 0    Insulin Lispro Prot & Lispro (HUMALOG MIX 75/25 KWIKPEN) (75-25) 100 UNIT/ML SC SUPN Inject 5 Units into the skin in the morning and 5 Units before bedtime. Per sliding scale.      folic acid 1 MG Oral Tab Take 1 tablet (1 mg total) by mouth daily. 90 tablet 0    rOPINIRole 1 MG Oral Tab Take 1 tablet (1 mg total) by mouth nightly. 90 tablet 0    hydrALAZINE 10 MG Oral Tab Take 1 tablet (10 mg total) by mouth 3 (three) times daily as needed (if  or grater). Give 10-20 mg  Give 50 mg if  or above      Melatonin 5 MG Oral Tab Take 1 tablet (5 mg total) by mouth nightly.      pregabalin 25 MG Oral Cap Take 1 capsule (25 mg total) by mouth 3 (three) times daily. 90 capsule 2    Insulin Pen Needle (TRUEPLUS PEN NEEDLES) 32G X 4 MM Does not apply Misc 1 each by other route in the morning and 1 each before bedtime 200 each 3    Continuous Blood Gluc Sensor (DEXCOM G7 SENSOR) Does not apply Misc 1 each Every 10 days. 10 each 1    Glucose Blood (ONETOUCH VERIO) In Vitro Strip 1 each by Other route daily. 100 strip 1    amLODIPine 10 MG Oral Tab Take 1 tablet (10 mg total) by mouth daily. 90 tablet 1    losartan 100 MG Oral Tab Take 1 tablet (100 mg total) by mouth daily. 90 tablet 1    rosuvastatin 10 MG Oral Tab Take 1 tablet (10 mg total) by mouth nightly. 90 tablet 1    escitalopram 20 MG Oral Tab 20 mg. 1 tablet 90 tablet 1    Continuous Blood Gluc Transmit (DEXCOM G6 TRANSMITTER) Does not apply Misc 1 each As Directed. Use with Dexcom G6 sensor. 1 every 90 days. 1 each 0    calcium acetate 667 MG Oral Cap Take 2 capsules (1,334 mg total) by mouth 3 (three) times daily. 180 capsule 0    HYDROcodone-acetaminophen (NORCO)  MG Oral Tab  Take 1 tablet by mouth every 8 (eight) hours as needed for Pain. 30 tablet 0    LATANOPROST OP Apply 1 drop to eye at bedtime. One drop to each eye nightly      cinacalcet 30 MG Oral Tab Take 1 tablet (30 mg total) by mouth daily with breakfast. T, Th, Sat      Insulin Pen Needle (TRUEPLUS 5-BEVEL PEN NEEDLES) 32G X 4 MM Does not apply Misc Test 2 times daily. 200 each 3    OneTouch Delica Lancets 33G Does not apply Misc Test once daily 100 each 3    Blood Glucose Monitoring Suppl (ONETOUCH VERIO) w/Device Does not apply Kit 1 kit by Does not apply route as needed. 1 kit 0    BABY ASPIRIN OR Take 81 mg by mouth daily.      docusate sodium 100 MG Oral Cap 1 capsule (100 mg total) 2 (two) times daily. PRN       Review of Systems:   A comprehensive review of systems was completed.    Pertinent positives and negatives noted in the HPI.    Objective:   Physical Exam:    BP (!) 183/73   Pulse 59   Temp 98 °F (36.7 °C) (Temporal)   Resp 10   SpO2 97%   General: No acute distress, awake and alert  Respiratory: No rhonchi, no wheezes  Cardiovascular: S1, S2. Regular rate and rhythm  Abdomen: Soft, Non-tender, non-distended, positive bowel sounds  Neuro: Moves all extremities x 4  Extremities: No edema. Bilateral upper extremity with AVF sites    Results:    Labs:      Labs Last 24 Hours:  Recent Labs   Lab 02/07/24  1108   RBC 3.07*   HGB 10.9*   HCT 31.5*   .6*   MCH 35.5*   MCHC 34.6   RDW 15.9   NEPRELIM 2.55   WBC 4.5   PLT 62.0*     Recent Labs   Lab 02/07/24  1108   *   *   CREATSERUM 11.10*   EGFRCR 4*   CA 8.9   ALB 4.0      K 5.3*      CO2 26.0   ALKPHO 86   AST 11*   ALT 15*   BILT 0.6   TP 7.3     Lab Results   Component Value Date    INR 1.04 02/07/2024    INR 1.13 11/24/2023    INR 1.03 11/29/2022     No results for input(s): \"TROP\", \"TROPHS\", \"CK\" in the last 168 hours.    No results for input(s): \"TROP\", \"PBNP\" in the last 168 hours.    No results for input(s): \"PCT\" in the  last 168 hours.    Imaging: Imaging data reviewed in Epic.    Assessment & Plan:      #Clotted AV fistula  -Unable to declot as outpatient and CVC could not be placed either.  IR placed permacath today  -Outpatient vascular surgery f/u - sees Dr. Archuleta    #ESRD  -HD per nephrology. 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 Menon RN.    Rm Cardoso, DO    Supplementary Documentation:     The 21st Century Cures Act makes medical notes like these available to patients in the interest of transparency. Please be advised this is a medical document. Medical documents are intended to carry relevant information, facts as evident, and the clinical opinion of the practitioner. The medical note is intended as peer to peer communication and may appear blunt or direct. It is written in medical language and may contain abbreviations or verbiage that are unfamiliar.

## 2024-02-07 NOTE — PROCEDURES
[facility]  Procedure Note    Sarika Rob Patient Status:  Inpatient    10/3/1940 MRN [id number]   Location [facility] 3NE-A Attending Rm Cardoso,    Hosp Day # 0 PCP Baltazar Baker MD     Procedure: Tunneled HD cath placement    Pre-Procedure Diagnosis:  ESRD    Post-Procedure Diagnosis: Same    Anesthesia:  Local and Sedation    Findings:  23 cm tunneled HD cath placed to L int jug vein, tip to SVC.  OK to use    Specimens: None    Blood Loss:  MInimal    Tourniquet Time: None  Complications:  None  Drains:  None    Secondary Diagnosis:  None    Yovany Liu MD  2024

## 2024-02-07 NOTE — CONSULTS
[facility]  Report of Consultation    Sarika Rob Patient Status:  Emergency    10/3/1940 MRN [id number]   Location [facility] EMERGENCY DEPARTMENT Attending Nomi Garrison, DO   Hosp Day # 0 PCP Baltazar Baker MD     Reason for Consultation:  ESRD on HD/clotted dialysis access    History of Present Illness:  Sarika Rob is a a(n) 83 year old man known to our service with mult med probs incl ESRD due to DM and MM on HD TTHS at [facility] who had AVF declot just last week as outpt.  Last HD was Saturday.  Yest he presented to HD and access was again clotted.  Today he was seen for declot which was not successful and CVC was attempted to be placed but was also unsuccessful.      History:  Past Medical History:   Diagnosis Date    Chronic kidney disease     stage 5    Diabetes (HCC)     ESRD (end stage renal disease) (HCC)     Essential hypertension     Glaucoma     High blood pressure     High cholesterol     History of blood transfusion     Multiple myeloma (HCC)     Myeloma (HCC)     Peripheral vascular disease (HCC)     Renal disorder      Past Surgical History:   Procedure Laterality Date    APPENDECTOMY      AV FISTULA REVISION, OPEN      OTHER      dialysis     Family History   Problem Relation Age of Onset    Hypertension Father     Diabetes Father     Stroke Mother     Heart Disorder Mother       reports that he has never smoked. He has never used smokeless tobacco. He reports that he does not drink alcohol and does not use drugs.    Allergies:  No Known Allergies    Medications:  No current facility-administered medications for this encounter.  Prior to Admission Medications   Medication Sig    Lenalidomide 5 MG Oral Cap TAKE 1 CAPSULE DAILY FOR 14 DAYS OF A 28 DAY CYCLE    benzonatate 200 MG Oral Cap Take 1 capsule (200 mg total) by mouth 3 (three) times daily as needed for cough.    albuterol 108 (90 Base) MCG/ACT Inhalation Aero Soln Inhale 2 puffs into the lungs every 6 (six) hours as  needed for Wheezing.    cefuroxime 500 MG Oral Tab Take 1 tablet (500 mg total) by mouth every other day for 3 doses. On Dialysis days, take AFTER Hemodialysis    Insulin Lispro Prot & Lispro (HUMALOG MIX 75/25 KWIKPEN) (75-25) 100 UNIT/ML SC SUPN Inject 5 Units into the skin in the morning and 5 Units before bedtime. Per sliding scale.    folic acid 1 MG Oral Tab Take 1 tablet (1 mg total) by mouth daily.    rOPINIRole 1 MG Oral Tab Take 1 tablet (1 mg total) by mouth nightly.    hydrALAZINE 10 MG Oral Tab Take 1 tablet (10 mg total) by mouth 3 (three) times daily as needed (if  or grater). Give 10-20 mg  Give 50 mg if  or above    Melatonin 5 MG Oral Tab Take 1 tablet (5 mg total) by mouth nightly.    pregabalin 25 MG Oral Cap Take 1 capsule (25 mg total) by mouth 3 (three) times daily.    Insulin Pen Needle (TRUEPLUS PEN NEEDLES) 32G X 4 MM Does not apply Misc 1 each by other route in the morning and 1 each before bedtime    Continuous Blood Gluc Sensor (DEXCOM G7 SENSOR) Does not apply Misc 1 each Every 10 days.    Glucose Blood (ONETOUCH VERIO) In Vitro Strip 1 each by Other route daily.    amLODIPine 10 MG Oral Tab Take 1 tablet (10 mg total) by mouth daily.    losartan 100 MG Oral Tab Take 1 tablet (100 mg total) by mouth daily.    rosuvastatin 10 MG Oral Tab Take 1 tablet (10 mg total) by mouth nightly.    escitalopram 20 MG Oral Tab 20 mg. 1 tablet    Continuous Blood Gluc Transmit (DEXCOM G6 TRANSMITTER) Does not apply Misc 1 each As Directed. Use with Dexcom G6 sensor. 1 every 90 days.    calcium acetate 667 MG Oral Cap Take 2 capsules (1,334 mg total) by mouth 3 (three) times daily.    HYDROcodone-acetaminophen (NORCO)  MG Oral Tab Take 1 tablet by mouth every 8 (eight) hours as needed for Pain.    LATANOPROST OP Apply 1 drop to eye at bedtime. One drop to each eye nightly    cinacalcet 30 MG Oral Tab Take 1 tablet (30 mg total) by mouth daily with breakfast. T, Th, Sat    Insulin Pen  Needle (TRUEPLUS 5-BEVEL PEN NEEDLES) 32G X 4 MM Does not apply Misc Test 2 times daily.    OneTouch Delica Lancets 33G Does not apply Misc Test once daily    Blood Glucose Monitoring Suppl (ONETOUCH VERIO) w/Device Does not apply Kit 1 kit by Does not apply route as needed.    BABY ASPIRIN OR Take 81 mg by mouth daily.    docusate sodium 100 MG Oral Cap 1 capsule (100 mg total) 2 (two) times daily. PRN       Review of Systems:  Denies fever/chills  Denies wt loss/gain  Denies HA or visual changes  Denies CP or palpitations  Denies SOB/cough/hemoptysis  Denies abd or flank pain  Denies N/V/D  Denies change in urinary habits or gross hematuria  Denies LE edema  Denies skin rashes/myalgias/arthralgias      Physical Exam:   BP (!) 174/80   Pulse 58   Temp 98 °F (36.7 °C) (Temporal)   Resp 12   SpO2 100%   Temp (24hrs), Av °F (36.7 °C), Min:98 °F (36.7 °C), Max:98 °F (36.7 °C)     No intake or output data in the 24 hours ending 24 1147  Last 3 Weights   24 1008 163 lb (73.9 kg)   24 0128 164 lb 8 oz (74.6 kg)   24 2055 167 lb 8.8 oz (76 kg)   23 1821 160 lb (72.6 kg)   23 0612 158 lb 9.6 oz (71.9 kg)   23 0537 158 lb 11.2 oz (72 kg)   23 1643 161 lb 3.2 oz (73.1 kg)   23 1210 167 lb 8.8 oz (76 kg)   10/25/23 1205 163 lb (73.9 kg)     General: Alert and oriented in no apparent distress.  HEENT: No scleral icterus, MMM  Neck: Supple, no LACEY or thyromegaly  Cardiac: bradycardic, regular S1, S2 normal, no murmur or rub  Lungs: Clear without wheezes, rales, rhonchi.    Abdomen: Soft, non-tender. + bowel sounds, no palpable organomegaly  Extremities: Without clubbing, cyanosis or edema.  Neurologic: Alert and oriented, cranial nerves grossly intact, moving all extremities  Skin: Warm and dry, no rashes      Laboratory Data:  Lab Results   Component Value Date    CREATSERUM 11.10 2024     2024     2024    K 5.3 2024      02/07/2024    CO2 26.0 02/07/2024     02/07/2024    CA 8.9 02/07/2024    ALB 4.0 02/07/2024    ALKPHO 86 02/07/2024    BILT 0.6 02/07/2024    TP 7.3 02/07/2024    AST 11 02/07/2024    ALT 15 02/07/2024    PTT 29.9 02/07/2024    INR 1.04 02/07/2024    PTP 13.6 02/07/2024       BUN (mg/dL)   Date Value   02/07/2024 126 (H)   01/24/2024 46 (H)   01/08/2024 68 (H)     Blood Urea Nitrogen (mg/dL)   Date Value   02/23/2022 44.0 (H)   01/21/2022 33.0 (H)   12/01/2021 42.0 (H)     UREA NITROGEN (BUN) (mg/dL)   Date Value   06/15/2022 44 (H)     CREATININE (mg/dL)   Date Value   06/15/2022 6.59 (H)     Creatinine (mg/dL)   Date Value   02/07/2024 11.10 (H)   01/24/2024 6.00 (H)   01/08/2024 8.20 (H)   02/23/2022 6.51 (H)   01/21/2022 5.17 (H)   12/01/2021 6.15 (H)       No results found for: \"MICROALBCREA\"    Recent Labs   Lab 02/07/24  1108   INR 1.04       Recent Labs   Lab 02/07/24  1108      K 5.3*      CO2 26.0   *   CREATSERUM 11.10*   CA 8.9   *       Recent Labs   Lab 02/07/24  1108   ALT 15*   AST 11*   ALB 4.0       No results for input(s): \"PGLU\" in the last 168 hours.          Impression/Plan:    #1.  Clotted AVF- unfortunately AVF has clotted twice in the past week and was unable to be declotted.  In addition CVC could not be placed.  Will ask IR to place PC today and plan HD after    #2.  ESRD- due to DM.  HD today after catheter placement    #3.  Anemia- in setting of MM as well as ESRD.  Cont JOSE    #4.  Multiple myeloma- follow with heme    #5. HTN- cont amlodipine and losartan        Thank you for allowing me to participate in the care of your patient. Please do not hesitate to call with any questions or concerns.       Luis Angel Menon MD  2/7/2024  11:47 AM

## 2024-02-07 NOTE — ED QUICK NOTES
Orders for admission, patient is aware of plan and ready to go upstairs. Any questions, please call ED RN [phone] at extension [phone].     Patient Covid vaccination status: Fully vaccinated     COVID Test Ordered in ED: None    COVID Suspicion at Admission: N/A    Running Infusions:  None    Mental Status/LOC at time of transport: A/OX4    Other pertinent information:   CIWA score: N/A   NIH score:  N/A     AMBULATES W/ CANE  LAST DIALYSIS SATURDAY. USUALLY RECEIVES ON TU/TH/SAT

## 2024-02-07 NOTE — ED PROVIDER NOTES
Patient Seen in: [facility] Emergency Department      History     Chief Complaint   Patient presents with    Other            Stated Complaint: IJ issue    Subjective:   83-year-old male, history of end-stage renal disease on dialysis, multiple myeloma, presents for dialysis access issues.  Patient admitted in November for thrombosed right AV fistula clotting.  He has a history of a chronically occluded nonfunctioning left AV fistula.  He had declotting that was successful.  Apparently since then he is reclotting of the right AV fistula.  He was at [place] [facility] access [place] today for internal jugular catheter placement and per the notes there they could not flush either port try different size and that did not work either so the got the line and sent him here for evaluation.  Patient is not on anticoagulation.  He has no other complaints.  He was sent here for definitive access for dialysis.  Last session was Saturday, he is usually Tuesday, Thursday, Saturday.            Objective:   Past Medical History:   Diagnosis Date    Chronic kidney disease     stage 5    Diabetes (HCC)     ESRD (end stage renal disease) (HCC)     Essential hypertension     Glaucoma     High blood pressure     High cholesterol     History of blood transfusion     Multiple myeloma (HCC)     Myeloma (HCC)     Peripheral vascular disease (HCC)     Renal disorder               Past Surgical History:   Procedure Laterality Date    APPENDECTOMY      AV FISTULA REVISION, OPEN      OTHER      dialysis                Social History     Socioeconomic History    Marital status:    Tobacco Use    Smoking status: Never    Smokeless tobacco: Never   Vaping Use    Vaping Use: Never used   Substance and Sexual Activity    Alcohol use: Never    Drug use: Never   Other Topics Concern    Caffeine Concern No    Exercise No    Seat Belt Yes     Social Determinants of Health     Financial Resource Strain: Low Risk  (11/29/2023)    Financial Resource  Strain     Difficulty of Paying Living Expenses: Not hard at all     Med Affordability: No   Food Insecurity: No Food Insecurity (1/8/2024)    Food Insecurity     Food Insecurity: Never true   Transportation Needs: No Transportation Needs (1/8/2024)    Transportation Needs     Lack of Transportation: No   Housing Stability: Low Risk  (1/8/2024)    Housing Stability     Housing Instability: No              Review of Systems   Constitutional:  Negative for fever.   Respiratory:  Negative for shortness of breath.    Cardiovascular:  Negative for chest pain.   Gastrointestinal:  Negative for abdominal pain.   Hematological:  Does not bruise/bleed easily.       Positive for stated complaint: IJ issue  Other systems are as noted in HPI.  Constitutional and vital signs reviewed.      All other systems reviewed and negative except as noted above.    Physical Exam     ED Triage Vitals [02/07/24 1026]   BP (!) 174/80   Pulse 58   Resp 12   Temp 98 °F (36.7 °C)   Temp src Temporal   SpO2 100 %   O2 Device None (Room air)       Current:BP (!) 165/76   Pulse 57   Temp 98 °F (36.7 °C) (Temporal)   Resp 12   SpO2 100%         Physical Exam  Vitals and nursing note reviewed.   Constitutional:       General: He is not in acute distress.     Appearance: Normal appearance.   HENT:      Head: Normocephalic.   Cardiovascular:      Rate and Rhythm: Normal rate.   Pulmonary:      Effort: Pulmonary effort is normal.      Breath sounds: Normal breath sounds.   Abdominal:      Palpations: Abdomen is soft.   Musculoskeletal:      Cervical back: Neck supple.   Skin:     General: Skin is warm and dry.   Neurological:      Mental Status: He is alert.   Psychiatric:         Mood and Affect: Mood normal.         Behavior: Behavior normal.               ED Course     Labs Reviewed   COMP METABOLIC PANEL (14) - Abnormal; Notable for the following components:       Result Value    Glucose 119 (*)     Potassium 5.3 (*)      (*)      Creatinine 11.10 (*)     Calculated Osmolality 324 (*)     eGFR-Cr 4 (*)     AST 11 (*)     ALT 15 (*)     All other components within normal limits   CBC W/ DIFFERENTIAL - Abnormal; Notable for the following components:    RBC 3.07 (*)     HGB 10.9 (*)     HCT 31.5 (*)     PLT 62.0 (*)     .6 (*)     MCH 35.5 (*)     All other components within normal limits   PROTHROMBIN TIME (PT) - Normal   PTT, ACTIVATED - Normal   CBC WITH DIFFERENTIAL WITH PLATELET    Narrative:     The following orders were created for panel order CBC With Differential With Platelet.  Procedure                               Abnormality         Status                     ---------                               -----------         ------                     CBC W/ DIFFERENTIAL[[id number]]          Abnormal            Final result                 Please view results for these tests on the individual orders.   RAINBOW DRAW LAVENDER   RAINBOW DRAW LIGHT GREEN   RAINBOW DRAW BLUE   RAINBOW DRAW GOLD             Patient is a dressing to his right subclavian region where the access attempts were.  Not actively bleeding.  Dressing remains in place         MDM      US GUIDED IV:  Indication: Patient required placement of a peripheral venous catheter for laboratory evaluation, IV fluid and medication administration.  Several peripheral attempts were unsuccessful.   Using the linear probe covered and sterile technique, a short axis view of the left antecubital vein was obtained.  This was noted to be completely compressible and was identified as separate from any adjacent noncompressible arterial structures. Under real-time guidance, the angiocatheter was observed to tent the vein, then to puncture it.  Alcohol use on the skin.  Success on first attempt.  Minimal blood loss, aspirates and flushes well, no complications, patient tolerated well, secured with Tegaderm and tape     Admission disposition: 2/7/2024 12:41 PM       Differential diagnosis  includes, but not limited to, end-stage renal disease, electrolyte disturbance, volume overload, hypertension    Daughter at bedside helpful to provide information on the history presenting illness    External chart review demonstrates his outpatient access center visit today and his admission in November for clotted AV fistula    83-year-old male presents for access issues in the setting of need for dialysis, last session was Saturday.  Mildly hypokalemic and mildly hypertensive.  No significant sign of volume overload.  Discussed with interventional radiologist, will place a permacath.  May need declotting or AV revision for the recurrent clotting.  Patient also not on blood thinners.  He has chronic thrombocytopenia, anemia of chronic disease, coags are stable.  Admitted to our hospitalist, Dr. Menon also aware and saw the patient at bedside.  Will arrange for dialysis later today once access has been obtained.  Awaiting bed assignment                                   Medical Decision Making      Disposition and Plan     Clinical Impression:  1. ESRD (end stage renal disease) (HCC)    2. Clotted dialysis access, sequela (HCC)    3. Uremia    4. Hyperkalemia    5. Anemia of chronic disease    6. Thrombocytopenia (HCC)         Disposition:  Admit  2/7/2024 12:41 pm    Follow-up:  No follow-up provider specified.        Medications Prescribed:  Current Discharge Medication List                            Hospital Problems       Present on Admission  Date Reviewed: 1/24/2024            ICD-10-CM Noted POA    * (Principal) ESRD (end stage renal disease) (HCC) N18.6 12/18/2022 Unknown    Clotted dialysis access (HCC) T82.49XA 2/7/2024 Unknown

## 2024-02-07 NOTE — H&P
[facility]   History & Physical    Sarika Rob Patient Status:  Inpatient    10/3/1940 MRN [id number]   Location [facility] 3NE-A Attending Rm Cardoso,    Hosp Day # 0 PCP Baltazar Baker MD     Admitting Diagnosis:   ESRD    History of Present Illness:   84 yo M ESRD, unsuccessful HD cath at outpatient center    History   Past Medical History:  Past Medical History:   Diagnosis Date    Chronic kidney disease     stage 5    Diabetes (HCC)     ESRD (end stage renal disease) (HCC)     Essential hypertension     Glaucoma     High blood pressure     High cholesterol     History of blood transfusion     Multiple myeloma (HCC)     Myeloma (HCC)     Peripheral vascular disease (HCC)     Renal disorder        Past Surgical History:  Past Surgical History:   Procedure Laterality Date    APPENDECTOMY      AV FISTULA REVISION, OPEN      OTHER      dialysis       Social History:  Social History     Tobacco Use    Smoking status: Never    Smokeless tobacco: Never   Substance Use Topics    Alcohol use: Never        Family History:  Family History   Problem Relation Age of Onset    Hypertension Father     Diabetes Father     Stroke Mother     Heart Disorder Mother        Allergies/Medications:   Allergies:  No Known Allergies    Medications:  No current outpatient medications on file.    Physical Exam & Review of Systems:   Physical Exam:    BP (!) 183/73   Pulse 59   Temp 98 °F (36.7 °C) (Temporal)   Resp 10   SpO2 97%     General: NAD  Neck: No JVD  Lungs: CTA bilat  Heart: RRR, S1, S2  Abdomen: Soft, NT/ND, BS+x4  Extremities: Warm, dry, no LE edema bilat  Pulses: 2+ bilat DP    Results:   Labs:  Recent Labs   Lab 24  1108   RBC 3.07*   HGB 10.9*   HCT 31.5*   .6*   MCH 35.5*   MCHC 34.6   RDW 15.9   NEPRELIM 2.55   WBC 4.5   PLT 62.0*     Recent Labs   Lab 24  1108   PTP 13.6   INR 1.04   PTT 29.9     Recent Labs   Lab 24  1108   *   *   CREATSERUM 11.10*   CA 8.9   NA  136   K 5.3*      CO2 26.0       Assessment/Plan:   Impression: 82 yo M ESRD needing HD cath    I have discussed with the patient and/or legal representative the potential benefits, risks, and side effects of this procedure, the likelihood of the patient achieving goals; and the potential problems that might occur during recuperation.  I discussed reasonable alternatives to the procedure, including risks, benefits and side effects related to the alternatives, and risks related to not receiving this procedure.      Recommendations: Tunneled HD cath placement    Yovany Liu MD  2/7/2024  3:20 PM

## 2024-02-07 NOTE — ED INITIAL ASSESSMENT (HPI)
Patient sent to ER via EMS from [facility] [place]. Per medics, patients fistula is clotted. Staff attempted to place an internal jugular catheter but were unsuccessful. Last dialysis was Saturday.

## 2024-02-08 VITALS
BODY MASS INDEX: 24 KG/M2 | DIASTOLIC BLOOD PRESSURE: 72 MMHG | SYSTOLIC BLOOD PRESSURE: 148 MMHG | OXYGEN SATURATION: 97 % | TEMPERATURE: 99 F | HEART RATE: 61 BPM | WEIGHT: 161.19 LBS | RESPIRATION RATE: 16 BRPM

## 2024-02-08 LAB
ANION GAP SERPL CALC-SCNC: 6 MMOL/L (ref 0–18)
BUN BLD-MCNC: 63 MG/DL (ref 9–23)
CALCIUM BLD-MCNC: 8.3 MG/DL (ref 8.5–10.1)
CHLORIDE SERPL-SCNC: 109 MMOL/L (ref 98–112)
CO2 SERPL-SCNC: 24 MMOL/L (ref 21–32)
CREAT BLD-MCNC: 7.17 MG/DL
EGFRCR SERPLBLD CKD-EPI 2021: 7 ML/MIN/1.73M2 (ref 60–?)
GLUCOSE BLD-MCNC: 112 MG/DL (ref 70–99)
GLUCOSE BLD-MCNC: 118 MG/DL (ref 70–99)
GLUCOSE BLD-MCNC: 143 MG/DL (ref 70–99)
OSMOLALITY SERPL CALC.SUM OF ELEC: 307 MOSM/KG (ref 275–295)
POTASSIUM SERPL-SCNC: 4.9 MMOL/L (ref 3.5–5.1)
SODIUM SERPL-SCNC: 139 MMOL/L (ref 136–145)

## 2024-02-08 PROCEDURE — 99232 SBSQ HOSP IP/OBS MODERATE 35: CPT | Performed by: INTERNAL MEDICINE

## 2024-02-08 PROCEDURE — 5A1D70Z PERFORMANCE OF URINARY FILTRATION, INTERMITTENT, LESS THAN 6 HOURS PER DAY: ICD-10-PCS | Performed by: INTERNAL MEDICINE

## 2024-02-08 PROCEDURE — 99239 HOSP IP/OBS DSCHRG MGMT >30: CPT | Performed by: INTERNAL MEDICINE

## 2024-02-08 RX ORDER — HEPARIN SODIUM 1000 [USP'U]/ML
INJECTION, SOLUTION INTRAVENOUS; SUBCUTANEOUS
Status: COMPLETED
Start: 2024-02-08 | End: 2024-02-08

## 2024-02-08 RX ORDER — ALBUMIN (HUMAN) 12.5 G/50ML
25 SOLUTION INTRAVENOUS
Status: DISCONTINUED | OUTPATIENT
Start: 2024-02-08 | End: 2024-02-08

## 2024-02-08 RX ORDER — LIDOCAINE HYDROCHLORIDE 10 MG/ML
INJECTION, SOLUTION INFILTRATION; PERINEURAL
Status: COMPLETED
Start: 2024-02-08 | End: 2024-02-08

## 2024-02-08 NOTE — PROGRESS NOTES
[facility]     Nephrology Progress Note    Sarika Rob Patient Status:  Inpatient    10/3/1940 MRN [id number]   Location [facility] 3NE-A Attending Rm Cardoso,    Hosp Day # 1 PCP Baltazar Baker MD       SUBJECTIVE:  Has had some oozing from [facility] site overnight, otherwise stable        Physical Exam:   /71 (BP Location: Left arm)   Pulse 59   Temp 97.7 °F (36.5 °C) (Oral)   Resp 16   Wt 161 lb 2.5 oz (73.1 kg)   SpO2 94%   BMI 23.79 kg/m²   Temp (24hrs), Av.7 °F (36.5 °C), Min:97.3 °F (36.3 °C), Max:98 °F (36.7 °C)       Intake/Output Summary (Last 24 hours) at 2024 0847  Last data filed at 2024 1900  Gross per 24 hour   Intake 480 ml   Output --   Net 480 ml     Last 3 Weights   24 1551 161 lb 2.5 oz (73.1 kg)   24 1008 163 lb (73.9 kg)   24 0128 164 lb 8 oz (74.6 kg)   24 2055 167 lb 8.8 oz (76 kg)   23 1821 160 lb (72.6 kg)   23 0612 158 lb 9.6 oz (71.9 kg)   23 0537 158 lb 11.2 oz (72 kg)   23 1643 161 lb 3.2 oz (73.1 kg)   23 1210 167 lb 8.8 oz (76 kg)     General: Alert and oriented in no apparent distress.  HEENT: No scleral icterus, MMM  Neck: Supple, no LACEY or thyromegaly  Cardiac: Bradycardic and regular, S1, S2 normal, no murmur or rub  Lungs: Clear without wheezes, rales, rhonchi.    Abdomen: Soft, non-tender. + bowel sounds, no palpable organomegaly  Extremities: Without clubbing, cyanosis or edema.  Neurologic:  moving all extremities  Skin: Warm and dry, no rash        Labs:     Recent Labs   Lab 24  1108   WBC 4.5   HGB 10.9*   .6*   PLT 62.0*   INR 1.04       Recent Labs   Lab 24  1108 24  0657    139   K 5.3* 4.9    109   CO2 26.0 24.0   * 63*   CREATSERUM 11.10* 7.17*   CA 8.9 8.3*   * 112*       Recent Labs   Lab 24  1108   ALT 15*   AST 11*   ALB 4.0       Recent Labs   Lab 24  1706 24  2154 24  0522   PGLU 106* 155* 118*        Meds:   Current Facility-Administered Medications   Medication Dose Route Frequency    sodium chloride 0.9 % IV bolus 100 mL  100 mL Intravenous Q30 Min PRN    And    albumin human (Albumin) 25% injection 25 g  25 g Intravenous PRN Dialysis    heparin (Porcine) 100 Units/mL lock flush 150 Units  1.5 mL Intravenous Once    albuterol (Ventolin HFA) 108 (90 Base) MCG/ACT inhaler 2 puff  2 puff Inhalation Q6H PRN    amLODIPine (Norvasc) tab 10 mg  10 mg Oral Daily    aspirin chewable tab 81 mg  81 mg Oral Daily    calcium acetate (Phoslo) cap 1,334 mg  2 capsule Oral TID    cinacalcet (Sensipar) tab 30 mg  30 mg Oral Daily with breakfast    escitalopram (Lexapro) tab 20 mg  20 mg Oral QAM    folic acid (Folvite) tab 1 mg  1 mg Oral Daily    latanoprost (Xalatan) 0.005 % ophthalmic solution 1 drop  1 drop Both Eyes Nightly    losartan (Cozaar) tab 100 mg  100 mg Oral Daily    pregabalin (Lyrica) cap 25 mg  25 mg Oral TID    rOPINIRole (Requip) tab 1 mg  1 mg Oral Nightly    rosuvastatin (Crestor) tab 10 mg  10 mg Oral Nightly    glucose (Dex4) 15 GM/59ML oral liquid 15 g  15 g Oral Q15 Min PRN    Or    glucose (Glutose) 40% oral gel 15 g  15 g Oral Q15 Min PRN    Or    glucose-vitamin C (Dex-4) chewable tab 4 tablet  4 tablet Oral Q15 Min PRN    Or    dextrose 50% injection 50 mL  50 mL Intravenous Q15 Min PRN    Or    glucose (Dex4) 15 GM/59ML oral liquid 30 g  30 g Oral Q15 Min PRN    Or    glucose (Glutose) 40% oral gel 30 g  30 g Oral Q15 Min PRN    Or    glucose-vitamin C (Dex-4) chewable tab 8 tablet  8 tablet Oral Q15 Min PRN    insulin aspart (NovoLOG) 100 Units/mL FlexPen 1-10 Units  1-10 Units Subcutaneous TID AC and HS    heparin (Porcine) 5000 UNIT/ML injection 5,000 Units  5,000 Units Subcutaneous 2 times per day    acetaminophen (Tylenol Extra Strength) tab 500 mg  500 mg Oral Q4H PRN    melatonin tab 3 mg  3 mg Oral Nightly PRN    polyethylene glycol (PEG 3350) (Miralax) 17 g oral packet 17 g  17 g  Oral Daily PRN    sennosides (Senokot) tab 17.2 mg  17.2 mg Oral Nightly PRN    bisacodyl (Dulcolax) 10 MG rectal suppository 10 mg  10 mg Rectal Daily PRN    ondansetron (Zofran) 4 MG/2ML injection 4 mg  4 mg Intravenous Q6H PRN    metoclopramide (Reglan) 5 mg/mL injection 5 mg  5 mg Intravenous Q8H PRN    benzonatate (Tessalon) cap 200 mg  200 mg Oral TID PRN    guaiFENesin (Robitussin) 100 MG/5 ML oral liquid 200 mg  200 mg Oral Q4H PRN    glycerin-hypromellose- (Artifical Tears) 0.2-0.2-1 % ophthalmic solution 1 drop  1 drop Both Eyes QID PRN    sodium chloride (Saline Mist) 0.65 % nasal solution 1 spray  1 spray Each Nare Q3H PRN         Impression/Plan:      #1.  Clotted AVF- unfortunately AVF has clotted twice in the past week and was unable to be declotted.  Appreciate IR assistance in placement of central venous catheter.  Will refer to vascular surgery as outpatient to discuss access options    #2.  ESRD-he was dialyzed yesterday off schedule and we will plan short HD today to maintain usual Tuesday/Thursday/Saturday schedule     #3.  Anemia- in setting of MM as well as ESRD.  Cont JOSE     #4.  Multiple myeloma- follow with heme     #5. HTN- cont amlodipine and losartan    Questions/concerns were discussed with patient and/or family by bedside.    Likely can go home after dialysis today      Luis Angel Menon MD  2/8/2024  8:47 AM

## 2024-02-08 NOTE — PLAN OF CARE
Received patient from IR, per IR RN internal jugular cath placed to left neck is patent and can be accessed for HD scheduled today.   Admission navigator completed, patients daughters at bedside. Dr. Cardoso on floor to see patient, orders received.   This RN called to schedule HD, to be performed on NOC shift.  Safety precautions are in place. pt. on RA, 1L fluid restriction,  cardiac and pulse ox monitored, up w/ assist and walker.     Problem: SAFETY ADULT - FALL  Goal: Free from fall injury  Description: INTERVENTIONS:  - Assess pt frequently for physical needs  - Identify cognitive and physical deficits and behaviors that affect risk of falls.  - [place] fall precautions as indicated by assessment.  - Educate pt/family on patient safety including physical limitations  - Instruct pt to call for assistance with activity based on assessment  - Modify environment to reduce risk of injury  - Provide assistive devices as appropriate  - Consider OT/PT consult to assist with strengthening/mobility  - Encourage toileting schedule  Outcome: Progressing     Problem: PAIN - ADULT  Goal: Verbalizes/displays adequate comfort level or patient's stated pain goal  Description: INTERVENTIONS:  - Encourage pt to monitor pain and request assistance  - Assess pain using appropriate pain scale  - Administer analgesics based on type and severity of pain and evaluate response  - Implement non-pharmacological measures as appropriate and evaluate response  - Consider cultural and social influences on pain and pain management  - Manage/alleviate anxiety  - Utilize distraction and/or relaxation techniques  - Monitor for opioid side effects  - Notify MD/LIP if interventions unsuccessful or patient reports new pain  - Anticipate increased pain with activity and pre-medicate as appropriate  Outcome: Progressing

## 2024-02-08 NOTE — DISCHARGE SUMMARY
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  He just had AVF declot last week as an outpatient.  He went to hemodialysis today and access was again noted to be clotted.  Declot was unsuccessful and CVC was attempted to be placed but was also unsuccessful so he presented to the ER.  Last hemodialysis was Saturday.  Today he had tunneled HD cath placed by IR and plan is for HD after.  Patient denies any fever, chills, chest pain or shortness of breath.  No nausea, vomiting, diarrhea, abdominal pain or dysuria.     Brief Synopsis: Nephrology consulted and permacath placed by IR.  He had dialysis the night of arrival and then again the day after which is his regularly scheduled dialysis.  He will follow-up with vascular surgery as outpatient.  He will also follow-up with nephrology outpatient as family is considering renal transplant though they understand he may not be a candidate.     Lace+ Score: 82  59-90 High Risk  29-58 Medium Risk  0-28   Low Risk       TCM Follow-Up Recommendation:  LACE > 58: High Risk of readmission after discharge from the hospital.    Procedures during hospitalization:   Permacath placement    Incidental or significant findings and  recommendations (brief descriptions):  None    Lab/Test results pending at Discharge:   None     Consultants:  Nephrology  IR    Discharge Medication List:     Discharge Medications        CONTINUE taking these medications        Instructions Prescription details   albuterol 108 (90 Base) MCG/ACT Aers  Commonly known as: Ventolin HFA      Inhale 2 puffs into the lungs every 6 (six) hours as needed for Wheezing.   Quantity: 1 each  Refills: 0     amLODIPine 10 MG Tabs  Commonly known as: Norvasc      Take 1 tablet (10 mg total) by mouth daily.   Quantity: 90 tablet  Refills: 1     BABY ASPIRIN OR      Take 81 mg by mouth daily.   Refills: 0     benzonatate 200 MG Caps  Commonly known as: Tessalon      Take 1 capsule (200 mg total) by mouth 3 (three) times daily as needed for cough.   Quantity: 30 capsule  Refills: 0     calcium acetate 667 MG Caps  Commonly known as: Phoslo      Take 2 capsules (1,334 mg total) by mouth 3 (three) times daily.   Quantity: 180 capsule  Refills: 0     cinacalcet 30 MG Tabs  Commonly known as: Sensipar      Take 1 tablet (30 mg total) by mouth daily with breakfast.   Refills: 0     Dexcom G6 Transmitter Misc      1 each As Directed. Use with Dexcom G6 sensor. 1 every 90 days.   Quantity: 1 each  Refills: 0     Dexcom G7 Sensor Misc      1 each Every 10 days.   Quantity: 10 each  Refills: 1     docusate sodium 100 MG Caps  Commonly known as: Colace      1 capsule (100 mg total) 2 (two) times daily. PRN   Refills: 0     escitalopram 20 MG Tabs  Commonly known as: Lexapro      20 mg. 1 tablet   Quantity: 90 tablet  Refills: 1     folic acid 1 MG Tabs  Commonly known as: Folvite      Take 1 tablet (1 mg total) by mouth daily.   Quantity: 90 tablet  Refills: 0     HumaLOG Mix 75/25 KwikPen (75-25) 100 UNIT/ML Supn  Generic drug: Insulin Lispro Prot & Lispro      Inject 5 Units into the skin in the morning and 5 Units before bedtime. Per sliding scale.   Refills: 0     hydrALAZINE 10 MG  Tabs  Commonly known as: Apresoline  Notes to patient: NOTE instructions based on your blood pressure reading      Take 1 tablet (10 mg total) by mouth 3 (three) times daily as needed (if  or grater). Give 10-20 mg  Give 50 mg if  or above   Refills: 0     HYDROcodone-acetaminophen  MG Tabs  Commonly known as: Norco      Take 1 tablet by mouth every 8 (eight) hours as needed for Pain.   Quantity: 30 tablet  Refills: 0     LATANOPROST OP      Apply 1 drop to eye at bedtime. One drop to each eye nightly   Refills: 0     Lenalidomide 5 MG Caps      TAKE 1 CAPSULE DAILY FOR 14 DAYS OF A 28 DAY CYCLE   Quantity: 14 capsule  Refills: 0     losartan 100 MG Tabs  Commonly known as: Cozaar      Take 1 tablet (100 mg total) by mouth daily.   Quantity: 90 tablet  Refills: 1     Melatonin 5 MG Tabs      Take 1 tablet (5 mg total) by mouth nightly.   Refills: 0     OneTouch Delica Lancets 33G Misc      Test once daily   Quantity: 100 each  Refills: 3     OneTouch Verio Strp      1 each by Other route daily.   Quantity: 100 strip  Refills: 1     OneTouch Verio w/Device Kit      1 kit by Does not apply route as needed.   Quantity: 1 kit  Refills: 0     pregabalin 25 MG Caps  Commonly known as: Lyrica      Take 1 capsule (25 mg total) by mouth 3 (three) times daily.   Quantity: 90 capsule  Refills: 2     rOPINIRole 1 MG Tabs  Commonly known as: Requip      Take 1 tablet (1 mg total) by mouth nightly.   Quantity: 90 tablet  Refills: 0     rosuvastatin 10 MG Tabs  Commonly known as: Crestor      Take 1 tablet (10 mg total) by mouth nightly.   Quantity: 90 tablet  Refills: 1     TRUEplus 5-Bevel Pen Needles 32G X 4 MM Misc  Generic drug: Insulin Pen Needle      Test 2 times daily.   Quantity: 200 each  Refills: 3     TRUEplus Pen Needles 32G X 4 MM Misc  Generic drug: Insulin Pen Needle      1 each by other route in the morning and 1 each before bedtime   Quantity: 200 each  Refills: 3              [place]MP reviewed[place]  No controlled substances prescribed at discharge.    Follow-up appointment:   Jah Archuleta MD  [address] [facility]  [address]  [place] [address]  [phone]    Follow up  Nonfunctioning AV fistula    Baltazar Baker MD  [address] [place] [address]  [address]  [place] [address]  [phone]    Follow up in 1 week(s)      Appointments for Next 30 Days 2/8/2024 - 3/9/2024      None            Vital signs:  Temp:  [97.3 °F (36.3 °C)-98.5 °F (36.9 °C)] 98.5 °F (36.9 °C)  Pulse:  [59-64] 61  Resp:  [10-18] 16  BP: (148-183)/(71-78) 148/72  SpO2:  [89 %-97 %] 97 %    Physical Exam:    General: No acute distress, awake and alert  Respiratory: No rhonchi, no wheezes  Cardiovascular: S1, S2. Regular rate and rhythm  Abdomen: Soft, Non-tender, non-distended, positive bowel sounds  Neuro: Moves all extremities x 4  Extremities: No edema. Bilateral upper extremity with AVF sites  -----------------------------------------------------------------------------------------------  PATIENT DISCHARGE INSTRUCTIONS: See electronic chart    Rm Cardoso DO    Time spent:  32 minutes

## 2024-02-08 NOTE — PLAN OF CARE
Assumed pt care at 1930  Aox4, RA, VSS  Tele-NSR  Heparin subcutaneous DVT prophylaxis  Reported no pain; no n/v/d  Continent-Oliguric  Renal diet; with carb controlled; 2gm NA restriction; fluid restriction of 1080 mL; thin liquids  Qid accu check  Ambulatory-SBA  Family to stay at bedside  Safety precautions in place  Bed in lowest position and call light within reach  Family and patient updated with plan of care  All needs met at this time  Will continue plan of care      **Hemodialysis treatment- 2L off      Problem: SAFETY ADULT - FALL  Goal: Free from fall injury  Description: INTERVENTIONS:  - Assess pt frequently for physical needs  - Identify cognitive and physical deficits and behaviors that affect risk of falls.  - [place] fall precautions as indicated by assessment.  - Educate pt/family on patient safety including physical limitations  - Instruct pt to call for assistance with activity based on assessment  - Modify environment to reduce risk of injury  - Provide assistive devices as appropriate  - Consider OT/PT consult to assist with strengthening/mobility  - Encourage toileting schedule  2/7/2024 2227 by Princess Nisa Torres RN  Outcome: Progressing  2/7/2024 2227 by Princess Nisa Torres, RN  Outcome: Progressing     Problem: PAIN - ADULT  Goal: Verbalizes/displays adequate comfort level or patient's stated pain goal  Description: INTERVENTIONS:  - Encourage pt to monitor pain and request assistance  - Assess pain using appropriate pain scale  - Administer analgesics based on type and severity of pain and evaluate response  - Implement non-pharmacological measures as appropriate and evaluate response  - Consider cultural and social influences on pain and pain management  - Manage/alleviate anxiety  - Utilize distraction and/or relaxation techniques  - Monitor for opioid side effects  - Notify MD/LIP if interventions unsuccessful or patient reports new pain  - Anticipate increased pain with activity  and pre-medicate as appropriate  2/7/2024 2227 by Princess Nisa Torres, RN  Outcome: Progressing  2/7/2024 2227 by Princess Nisa Torres, RN  Outcome: Progressing     Problem: METABOLIC/FLUID AND ELECTROLYTES - ADULT  Goal: Glucose maintained within prescribed range  Description: INTERVENTIONS:  - Monitor Blood Glucose as ordered  - Assess for signs and symptoms of hyperglycemia and hypoglycemia  - Administer ordered medications to maintain glucose within target range  - Assess barriers to adequate nutritional intake and initiate nutrition consult as needed  - Instruct patient on self management of diabetes  Outcome: Progressing  Goal: Electrolytes maintained within normal limits  Description: INTERVENTIONS:  - Monitor labs and rhythm and assess patient for signs and symptoms of electrolyte imbalances  - Administer electrolyte replacement as ordered  - Monitor response to electrolyte replacements, including rhythm and repeat lab results as appropriate  - Fluid restriction as ordered  - Instruct patient on fluid and nutrition restrictions as appropriate  Outcome: Progressing  Goal: Hemodynamic stability and optimal renal function maintained  Description: INTERVENTIONS:  - Monitor labs and assess for signs and symptoms of volume excess or deficit  - Monitor intake, output and patient weight  - Monitor urine specific gravity, serum osmolarity and serum sodium as indicated or ordered  - Monitor response to interventions for patient's volume status, including labs, urine output, blood pressure (other measures as available)  - Encourage oral intake as appropriate  - Instruct patient on fluid and nutrition restrictions as appropriate  Outcome: Progressing

## 2024-02-08 NOTE — PLAN OF CARE
Patient alert and oriented x4.  On RA.  NSR on tele.  HR 60s.  Denies pain.  L permcath bleeding.  MD aware.  Dressing change completed x2.  Quick clot applied.  Plan for dialysis today then dc.

## 2024-02-08 NOTE — PAYOR COMM NOTE
--------------  ADMISSION REVIEW     Payor: [facility]  Subscriber #:  [id number]  Authorization Number: [id number]    Admit date: 2/7/24  Admit time:  2:55 PM                 REVIEW DOCUMENTATION:  Patient Seen in: [facility] Emergency Department      Stated Complaint: IJ issue    Subjective:   83-year-old male, history of end-stage renal disease on dialysis, multiple myeloma, presents for dialysis access issues.  Patient admitted in November for thrombosed right AV fistula clotting.  He has a history of a chronically occluded nonfunctioning left AV fistula.  He had declotting that was successful.  Apparently since then he is reclotting of the right AV fistula.  [place] [facility] [place] [facility] [place] for internal jugular catheter placement and per the notes there they could not flush either port try different size and that did not work either so the got the line and sent him here for evaluation.  Patient is not on anticoagulation.  He has no other complaints.  He was sent here for definitive access for dialysis.  Last session was Saturday, he is usually Tuesday, Thursday, Saturday.    Social Determinants of Health     Financial Resource Strain: Low Risk  (11/29/2023)    Financial Resource Strain     Difficulty of Paying Living Expenses: Not hard at all     Med Affordability: No   Food Insecurity: No Food Insecurity (1/8/2024)    Food Insecurity     Food Insecurity: Never true   Transportation Needs: No Transportation Needs (1/8/2024)    Transportation Needs     Lack of Transportation: No   Housing Stability: Low Risk  (1/8/2024)    Housing Stability     Housing Instability: No       Physical Exam     ED Triage Vitals [02/07/24 1026]   BP (!) 174/80   Pulse 58   Resp 12   Temp 98 °F (36.7 °C)   Temp src Temporal   SpO2 100 %   O2 Device None (Room air)       ED Course     Labs Reviewed   COMP METABOLIC PANEL (14) - Abnormal; Notable for the following components:       Result  Value    Glucose 119 (*)     Potassium 5.3 (*)      (*)     Creatinine 11.10 (*)     Calculated Osmolality 324 (*)     eGFR-Cr 4 (*)     AST 11 (*)     ALT 15 (*)     All other components within normal limits   CBC W/ DIFFERENTIAL - Abnormal; Notable for the following components:    RBC 3.07 (*)     HGB 10.9 (*)     HCT 31.5 (*)     PLT 62.0 (*)     .6 (*)     MCH 35.5 (*)            Medical Decision Making      Disposition and Plan     Clinical Impression:  1. ESRD (end stage renal disease) (HCC)    2. Clotted dialysis access, sequela (HCC)    3. Uremia    4. Hyperkalemia    5. Anemia of chronic disease    6. Thrombocytopenia (HCC)         Disposition:  Admit  2/7/2024 12:41 pm      Chief Complaint: Clotted dialysis access    Subjective:    History of Present Illness:   Sarika Rob is a 83 year old male with PMHx ESRD on hemodialysis, multiple myeloma, diabetes mellitus type 2, hypertension, dyslipidemia and PVD who presents to the hospital due to clotted dialysis access.  He just had AVF declot last week as an outpatient.  He went to hemodialysis today and access was again noted to be clotted.  Declot was unsuccessful and CVC was attempted to be placed but was also unsuccessful so he presented to the ER.  Last hemodialysis was Saturday.  Today he had tunneled HD cath placed by IR and plan is for HD after.  Patient denies any fever, chills, chest pain or shortness of breath.  No nausea, vomiting, diarrhea, abdominal pain or dysuria.    History/Other:    Past Medical History:  Past Medical History:   Diagnosis Date    Chronic kidney disease     stage 5    Diabetes (HCC)     ESRD (end stage renal disease) (HCC)     Essential hypertension     Glaucoma     High blood pressure     High cholesterol     History of blood transfusion     Multiple myeloma (HCC)     Myeloma (HCC)     Peripheral vascular disease (HCC)     Renal disorder      Past Surgical History:   Past Surgical History:   Procedure Laterality  Date    APPENDECTOMY      AV FISTULA REVISION, OPEN      OTHER      dialysis      Family History:   Family History   Problem Relation Age of Onset    Hypertension Father     Diabetes Father     Stroke Mother     Heart Disorder Mother      Social History:    reports that he has never smoked. He has never used smokeless tobacco. He reports that he does not drink alcohol and does not use drugs.         Current Outpatient Medications on File Prior to Encounter   Medication Sig Dispense Refill    Lenalidomide 5 MG Oral Cap TAKE 1 CAPSULE DAILY FOR 14 DAYS OF A 28 DAY CYCLE 14 capsule 0    benzonatate 200 MG Oral Cap Take 1 capsule (200 mg total) by mouth 3 (three) times daily as needed for cough. 30 capsule 0    albuterol 108 (90 Base) MCG/ACT Inhalation Aero Soln Inhale 2 puffs into the lungs every 6 (six) hours as needed for Wheezing. 1 each 0    cefuroxime 500 MG Oral Tab Take 1 tablet (500 mg total) by mouth every other day for 3 doses. On Dialysis days, take AFTER Hemodialysis 3 tablet 0    Insulin Lispro Prot & Lispro (HUMALOG MIX 75/25 KWIKPEN) (75-25) 100 UNIT/ML SC SUPN Inject 5 Units into the skin in the morning and 5 Units before bedtime. Per sliding scale.      folic acid 1 MG Oral Tab Take 1 tablet (1 mg total) by mouth daily. 90 tablet 0    rOPINIRole 1 MG Oral Tab Take 1 tablet (1 mg total) by mouth nightly. 90 tablet 0    hydrALAZINE 10 MG Oral Tab Take 1 tablet (10 mg total) by mouth 3 (three) times daily as needed (if  or grater). Give 10-20 mg  Give 50 mg if  or above      Melatonin 5 MG Oral Tab Take 1 tablet (5 mg total) by mouth nightly.      pregabalin 25 MG Oral Cap Take 1 capsule (25 mg total) by mouth 3 (three) times daily. 90 capsule 2    Insulin Pen Needle (TRUEPLUS PEN NEEDLES) 32G X 4 MM Does not apply Misc 1 each by other route in the morning and 1 each before bedtime 200 each 3    Continuous Blood Gluc Sensor (DEXCOM G7 SENSOR) Does not apply Misc 1 each Every 10 days. 10  each 1    Glucose Blood (ONETOUCH VERIO) In Vitro Strip 1 each by Other route daily. 100 strip 1    amLODIPine 10 MG Oral Tab Take 1 tablet (10 mg total) by mouth daily. 90 tablet 1    losartan 100 MG Oral Tab Take 1 tablet (100 mg total) by mouth daily. 90 tablet 1    rosuvastatin 10 MG Oral Tab Take 1 tablet (10 mg total) by mouth nightly. 90 tablet 1    escitalopram 20 MG Oral Tab 20 mg. 1 tablet 90 tablet 1    Continuous Blood Gluc Transmit (DEXCOM G6 TRANSMITTER) Does not apply Misc 1 each As Directed. Use with Dexcom G6 sensor. 1 every 90 days. 1 each 0    calcium acetate 667 MG Oral Cap Take 2 capsules (1,334 mg total) by mouth 3 (three) times daily. 180 capsule 0    HYDROcodone-acetaminophen (NORCO)  MG Oral Tab Take 1 tablet by mouth every 8 (eight) hours as needed for Pain. 30 tablet 0    LATANOPROST OP Apply 1 drop to eye at bedtime. One drop to each eye nightly      cinacalcet 30 MG Oral Tab Take 1 tablet (30 mg total) by mouth daily with breakfast. T, Th, Sat      Insulin Pen Needle (TRUEPLUS 5-BEVEL PEN NEEDLES) 32G X 4 MM Does not apply Misc Test 2 times daily. 200 each 3    OneTouch Delica Lancets 33G Does not apply Misc Test once daily 100 each 3    Blood Glucose Monitoring Suppl (ONETOUCH VERIO) w/Device Does not apply Kit 1 kit by Does not apply route as needed. 1 kit 0    BABY ASPIRIN OR Take 81 mg by mouth daily.      docusate sodium 100 MG Oral Cap 1 capsule (100 mg total) 2 (two) times daily. PRN       Assessment & Plan:      #Clotted AV fistula  -Unable to declot as outpatient and CVC could not be placed either.  IR placed permacath today  -Outpatient vascular surgery f/u - sees Dr. Archuleta    #ESRD  -HD per nephrology. Plan for today and tomorrow    #Multiple myeloma  -Follows with hematology and is on lenalidomide outpatient    #Anemia due to multiple myeloma and ESRD  -Continue JOSE with HD    Chronic thrombocytopenia  -Stable    #Hypertension  -Continue amlodipine and  losartan    #Diabetes mellitus type 2  -A1c is less than 5  -Insulin sliding scale    #Dyslipidemia  -Rosuvastatin    #Anxiety depression  -Lexapro    #RLS  -Ropinirole nightly    #PVD  -Aspirin      Plan of care discussed with patient, daughters, Dr. Menon, RN.    Rm Cardoso DO        Nephrology 2/7/24    Reason for Consultation:  ESRD on HD/clotted dialysis access     History of Present Illness:  Sarika Rob is a a(n) 83 year old man known to our service with mult med probs incl ESRD due to DM and MM on HD TTHS at [facility] who had AVF declot just last week as outpt.  Last HD was Saturday.  Yest he presented to HD and access was again clotted.  Today he was seen for declot which was not successful and CVC was attempted to be placed but was also unsuccessful.      mpression/Plan:     #1.  Clotted AVF- unfortunately AVF has clotted twice in the past week and was unable to be declotted.  In addition CVC could not be placed.  Will ask IR to place PC today and plan HD after     #2.  ESRD- due to DM.  HD today after catheter placement     #3.  Anemia- in setting of MM as well as ESRD.  Cont JOSE     #4.  Multiple myeloma- follow with heme     #5. HTN- cont amlodipine and losartan           Thank you for allowing me to participate in the care of your patient. Please do not hesitate to call with any questions or concerns.        Luis Angel Menon MD      IR  Procedure: Tunneled HD cath placement     Pre-Procedure Diagnosis:  ESRD     Post-Procedure Diagnosis: Same     Anesthesia:  Local and Sedation     Findings:  23 cm tunneled HD cath placed to L int jug vein, tip to SVC.  OK to use     Specimens: None      Yovany Liu MD  2/7/2024    Renal diet; with carb controlled; 2gm NA restriction; fluid restriction of 1080 mL; thin liquids         Nephrology 2/8/24    ppreciate IR assistance in placement of central venous catheter.  Will refer to vascular surgery as outpatient to discuss access options     #2.  ESRD-he  was dialyzed yesterday off schedule and we will plan short HD today to maintain usual Tuesday/Thursday/Saturday schedule            Labs:          Recent Labs   Lab 02/07/24  1108   WBC 4.5   HGB 10.9*   .6*   PLT 62.0*   INR 1.04              Recent Labs   Lab 02/07/24  1108 02/08/24  0657    139   K 5.3* 4.9    109   CO2 26.0 24.0   * 63*   CREATSERUM 11.10* 7.17*   CA 8.9 8.3*   * 112*             Recent Labs   Lab 02/07/24  1108   ALT 15*   AST 11*   ALB 4.0               Recent Labs   Lab 02/07/24  1706 02/07/24  2154 02/08/24  0522   PGLU 106* 155* 118*       MEDICATIONS ADMINISTERED IN LAST 1 DAY:  acetaminophen (Tylenol Extra Strength) tab 500 mg       Date Action Dose Route User    2/7/2024 1840 Given 500 mg Oral Angle Jimenez RN          amLODIPine (Norvasc) tab 10 mg       Date Action Dose Route User    2/7/2024 1841 Given 10 mg Oral Angle Jimenez RN          aspirin chewable tab 81 mg       Date Action Dose Route User    2/8/2024 0829 Given 81 mg Oral Candice Leroy RN    2/7/2024 1841 Given 81 mg Oral Angle Jimenez RN          calcium acetate (Phoslo) cap 1,334 mg       Date Action Dose Route User    2/8/2024 0829 Given 1,334 mg Oral Candice Leroy RN    2/8/2024 0037 Given 1,334 mg Oral Princess Nisa Torres RN    2/7/2024 1840 Given 1,334 mg Oral Angle Jimenez RN          cinacalcet (Sensipar) tab 30 mg       Date Action Dose Route User    2/8/2024 0829 Given 30 mg Oral Candice Leroy RN          epoetin armando (Epogen, Procrit) 92445 UNIT/ML injection 10,000 Units       Date Action Dose Route User    2/8/2024 0037 Given 10,000 Units Intravenous Princess Nisa Torres RN          escitalopram (Lexapro) tab 20 mg       Date Action Dose Route User    2/8/2024 0829 Given 20 mg Oral Candice Leroy RN    2/7/2024 1841 Given 20 mg Oral Angle Jimenez, GENE          folic acid (Folvite) tab 1 mg       Date Action Dose Route User    2/8/2024 0829 Given 1 mg Oral  Candice Leroy RN    2/8/2024 0055 Given 1 mg Oral Princess Nisa Torres RN          heparin (Porcine) 1000 UNIT/ML injection 1,500 Units       Date Action Dose Route User    2/8/2024 0036 Given 1,500 Units Intracatheter Princess Nisa Torres RN          heparin (Porcine) 5000 UNIT/ML injection 5,000 Units       Date Action Dose Route User    2/8/2024 0044 Given 5,000 Units Subcutaneous (Left Lower Abdomen) Princess Nisa Torres RN          latanoprost (Xalatan) 0.005 % ophthalmic solution 1 drop       Date Action Dose Route User    2/8/2024 0037 Given 1 drop Both Eyes Princess Nisa Torres RN          losartan (Cozaar) tab 100 mg       Date Action Dose Route User    2/7/2024 1841 Given 100 mg Oral Angle Jimenez RN          pregabalin (Lyrica) cap 25 mg       Date Action Dose Route User    2/8/2024 0829 Given 25 mg Oral Candice Leroy RN    2/8/2024 0037 Given 25 mg Oral Princess Nisa Torres RN    2/7/2024 1841 Given 25 mg Oral Angle Jimenez RN          rOPINIRole (Requip) tab 1 mg       Date Action Dose Route User    2/8/2024 0037 Given 1 mg Oral Princess Nisa Torres RN          rosuvastatin (Crestor) tab 10 mg       Date Action Dose Route User    2/8/2024 0037 Given 10 mg Oral Princess Nisa Torres RN            Vitals (last day)       Date/Time Temp Pulse Resp BP SpO2 Weight O2 Device O2 Flow Rate (L/min) [facility]    02/08/24 1156 98.5 °F (36.9 °C) -- 16 -- -- -- None (Room air) 0 L/min     02/08/24 0817 -- 61 -- -- 97 % -- -- --     02/08/24 0816 -- 62 -- -- 97 % -- -- --     02/08/24 0815 -- 62 -- -- 97 % -- -- --     02/08/24 0814 98.4 °F (36.9 °C) 62 16 148/72 97 % -- None (Room air) 0 L/min     02/08/24 0813 -- 61 -- -- 89 % -- -- --     02/08/24 0812 -- 62 -- -- 97 % -- -- --     02/08/24 0811 -- 62 -- -- 97 % -- -- --     02/08/24 0810 -- 60 -- -- 96 % -- -- --     02/08/24 0519 97.7 °F (36.5 °C) 59 16 155/71 94 % -- None (Room air) --     02/07/24 1946 97.3 °F (36.3 °C) 64 18 166/78 96  % -- None (Room air) -- SB    02/07/24 1551 -- -- -- -- -- 161 lb 2.5 oz -- -- CS    02/07/24 1430 -- 59 10 183/73 97 % -- -- -- MG    02/07/24 1300 -- 56 14 152/88 100 % -- None (Room air) -- KA    02/07/24 1200 -- 57 12 165/76 100 % -- None (Room air) -- MG    02/07/24 1156 -- 58 11 -- 100 % -- -- -- MG    02/07/24 1039 -- -- -- -- -- -- None (Room air) -- MG    02/07/24 1026 98 °F (36.7 °C) 58 12 174/80 100 % -- None (Room air) -- MG Janice PARIS RN

## 2024-02-09 ENCOUNTER — PATIENT OUTREACH (OUTPATIENT)
Dept: CASE MANAGEMENT | Age: 84
End: 2024-02-09

## 2024-02-09 NOTE — PROGRESS NOTES
Attempted to contact pt for TCM however no answer. Call continued to ring and then disconnected. Unable to leave a VM at this time. NCM to try again at a later time.

## 2024-02-09 NOTE — PLAN OF CARE
NURSING DISCHARGE NOTE    Discharged Home via Wheelchair.  Accompanied by Family member  Belongings Taken by patient/family.    Patient given dc paperwork.  Dressing to L permcath changed by IR and stitch placed.

## 2024-02-09 NOTE — PAYOR COMM NOTE
--------------  DISCHARGE REVIEW    Payor: [facility]  Subscriber #:  [id number]  Authorization Number: [id number]    Admit date: 24  Admit time:   2:55 PM  Discharge Date: 2024  6:14 PM     Admitting Physician: Rm Cardoso DO  Attending Physician:  No att. providers found  Primary Care Physician: Baltazar Baker MD       [facility]     Sarika Rob Patient Status:  Observation    10/3/1940 MRN [id number]   Location [facility] 3NE-A Attending Rm Cardoso DO   Hosp Day # 1 PCP Baltazar Baker MD     Date of Admission: 2024  Date of Discharge: 2024  Discharge Disposition: Home Health Care Services    Discharge Diagnosis:   Clotted AV fistula  ESRD on HD  Multiple myeloma  Anemia due to multiple myeloma and ESRD  Chronic thrombocytopenia  Hypertension  DM type II  Dyslipidemia  Anxiety  Depression  Restless legs  Peripheral vascular disease    History of Present Illness: Sarika Rob is a 83 year old male with PMHx ESRD on hemodialysis, multiple myeloma, diabetes mellitus type 2, hypertension, dyslipidemia and PVD who presents to the hospital due to clotted dialysis access.  He just had AVF declot last week as an outpatient.  He went to hemodialysis today and access was again noted to be clotted.  Declot was unsuccessful and CVC was attempted to be placed but was also unsuccessful so he presented to the ER.  Last hemodialysis was Saturday.  Today he had tunneled HD cath placed by IR and plan is for HD after.  Patient denies any fever, chills, chest pain or shortness of breath.  No nausea, vomiting, diarrhea, abdominal pain or dysuria.     Brief Synopsis: Nephrology consulted and permacath placed by IR.  He had dialysis the night of arrival and then again the day after which is his regularly scheduled dialysis.  He will follow-up with vascular surgery as outpatient.  He will also follow-up with nephrology  outpatient as family is considering renal transplant though they understand he may not be a candidate.     Lace+ Score: 82  59-90 High Risk  29-58 Medium Risk  0-28   Low Risk       TCM Follow-Up Recommendation:  LACE > 58: High Risk of readmission after discharge from the hospital.    Procedures during hospitalization:   Permacath placement    Incidental or significant findings and recommendations (brief descriptions):  None    Lab/Test results pending at Discharge:   None     Consultants:  Nephrology  IR    Discharge Medication List:     Discharge Medications        CONTINUE taking these medications        Instructions Prescription details   albuterol 108 (90 Base) MCG/ACT Aers  Commonly known as: Ventolin HFA      Inhale 2 puffs into the lungs every 6 (six) hours as needed for Wheezing.   Quantity: 1 each  Refills: 0     amLODIPine 10 MG Tabs  Commonly known as: Norvasc      Take 1 tablet (10 mg total) by mouth daily.   Quantity: 90 tablet  Refills: 1     BABY ASPIRIN OR      Take 81 mg by mouth daily.   Refills: 0     benzonatate 200 MG Caps  Commonly known as: Tessalon      Take 1 capsule (200 mg total) by mouth 3 (three) times daily as needed for cough.   Quantity: 30 capsule  Refills: 0     calcium acetate 667 MG Caps  Commonly known as: Phoslo      Take 2 capsules (1,334 mg total) by mouth 3 (three) times daily.   Quantity: 180 capsule  Refills: 0     cinacalcet 30 MG Tabs  Commonly known as: Sensipar      Take 1 tablet (30 mg total) by mouth daily with breakfast.   Refills: 0     Dexcom G6 Transmitter Misc      1 each As Directed. Use with Dexcom G6 sensor. 1 every 90 days.   Quantity: 1 each  Refills: 0     Dexcom G7 Sensor Misc      1 each Every 10 days.   Quantity: 10 each  Refills: 1     docusate sodium 100 MG Caps  Commonly known as: Colace      1 capsule (100 mg total) 2 (two) times daily. PRN   Refills: 0     escitalopram 20 MG Tabs  Commonly known as: Lexapro      20 mg. 1 tablet   Quantity: 90  tablet  Refills: 1     folic acid 1 MG Tabs  Commonly known as: Folvite      Take 1 tablet (1 mg total) by mouth daily.   Quantity: 90 tablet  Refills: 0     HumaLOG Mix 75/25 KwikPen (75-25) 100 UNIT/ML Supn  Generic drug: Insulin Lispro Prot & Lispro      Inject 5 Units into the skin in the morning and 5 Units before bedtime. Per sliding scale.   Refills: 0     hydrALAZINE 10 MG Tabs  Commonly known as: Apresoline  Notes to patient: NOTE instructions based on your blood pressure reading      Take 1 tablet (10 mg total) by mouth 3 (three) times daily as needed (if  or grater). Give 10-20 mg  Give 50 mg if  or above   Refills: 0     HYDROcodone-acetaminophen  MG Tabs  Commonly known as: Norco      Take 1 tablet by mouth every 8 (eight) hours as needed for Pain.   Quantity: 30 tablet  Refills: 0     LATANOPROST OP      Apply 1 drop to eye at bedtime. One drop to each eye nightly   Refills: 0     Lenalidomide 5 MG Caps      TAKE 1 CAPSULE DAILY FOR 14 DAYS OF A 28 DAY CYCLE   Quantity: 14 capsule  Refills: 0     losartan 100 MG Tabs  Commonly known as: Cozaar      Take 1 tablet (100 mg total) by mouth daily.   Quantity: 90 tablet  Refills: 1     Melatonin 5 MG Tabs      Take 1 tablet (5 mg total) by mouth nightly.   Refills: 0     OneTouch Delica Lancets 33G Misc      Test once daily   Quantity: 100 each  Refills: 3     OneTouch Verio Strp      1 each by Other route daily.   Quantity: 100 strip  Refills: 1     OneTouch Verio w/Device Kit      1 kit by Does not apply route as needed.   Quantity: 1 kit  Refills: 0     pregabalin 25 MG Caps  Commonly known as: Lyrica      Take 1 capsule (25 mg total) by mouth 3 (three) times daily.   Quantity: 90 capsule  Refills: 2     rOPINIRole 1 MG Tabs  Commonly known as: Requip      Take 1 tablet (1 mg total) by mouth nightly.   Quantity: 90 tablet  Refills: 0     rosuvastatin 10 MG Tabs  Commonly known as: Crestor      Take 1 tablet (10 mg total) by mouth  nightly.   Quantity: 90 tablet  Refills: 1     TRUEplus 5-Bevel Pen Needles 32G X 4 MM Misc  Generic drug: Insulin Pen Needle      Test 2 times daily.   Quantity: 200 each  Refills: 3     TRUEplus Pen Needles 32G X 4 MM Misc  Generic drug: Insulin Pen Needle      1 each by other route in the morning and 1 each before bedtime   Quantity: 200 each  Refills: 3              [place]MP reviewed[place] No controlled substances prescribed at discharge.    Follow-up appointment:   Jah Archuleta MD  [address]  [address]  [place] [address]  [phone]    Follow up  Nonfunctioning AV fistula    Baltazar Baker MD  [address]  [address]  [place] [address]  [phone]    Follow up in 1 week(s)      Appointments for Next 30 Days 2/8/2024 - 3/9/2024      None            Vital signs:  Temp:  [97.3 °F (36.3 °C)-98.5 °F (36.9 °C)] 98.5 °F (36.9 °C)  Pulse:  [59-64] 61  Resp:  [10-18] 16  BP: (148-183)/(71-78) 148/72  SpO2:  [89 %-97 %] 97 %    Physical Exam:    General: No acute distress, awake and alert  Respiratory: No rhonchi, no wheezes  Cardiovascular: S1, S2. Regular rate and rhythm  Abdomen: Soft, Non-tender, non-distended, positive bowel sounds  Neuro: Moves all extremities x 4  Extremities: No edema. Bilateral upper extremity with AVF sites  -----------------------------------------------------------------------------------------------  PATIENT DISCHARGE INSTRUCTIONS: See electronic chart    Rm Cardoso DO    Time spent:  32 minutes    Electronically signed by Rm Cardoso DO on 2/8/2024  1:39 PM         REVIEWER COMMENTS

## 2024-02-09 NOTE — PROGRESS NOTES
Name:Sarika Rob  MRN#:[id number]  :10/3/1940      Subjective:  Blood oozing slowly from new Permcath site    Objective:  Mild oozing seen at skin exit site of new tunneled Left I J permcath    Vital Signs:  Blood pressure 148/72, pulse 61, temperature 98.5 °F (36.9 °C), temperature source Oral, resp. rate 16, weight 161 lb 2.5 oz, SpO2 97%.    Input/Output:    Intake/Output Summary (Last 24 hours) at 2024 1812  Last data filed at 2024 1156  Gross per 24 hour   Intake 360 ml   Output --   Net 360 ml     Using sterile technique a purse string suture was placed at the skin exit site of the left I j tunneled permcath.  1% lidocaine used. Quick clot placed and cath site dressed sterilely.     Assessment/Plan:  Oozing at permcath site appears to have stopped after purse string suture placement. Sutures should be removed in about one week at op dialysis.     Farhat Garland MD  2024  6:12 PM

## 2024-02-14 DIAGNOSIS — G62.9 PERIPHERAL POLYNEUROPATHY: ICD-10-CM

## 2024-02-15 RX ORDER — PREGABALIN 25 MG/1
25 CAPSULE ORAL 3 TIMES DAILY
Qty: 90 CAPSULE | Refills: 2 | Status: SHIPPED | OUTPATIENT
Start: 2024-02-15

## 2024-02-15 NOTE — TELEPHONE ENCOUNTER
Medication: pregabalin 25 MG Oral Cap      Date of last refill: 11/07/2023 (#90/2)  [place] 01/23/2024     Last office visit: 08/14/2023  Due back to clinic per last office note:  6mos  Date next office visit scheduled:    Future Appointments   Date Time Provider Department Center   4/12/2024 11:00 AM PF US RM3 PF [facility]   4/24/2024 11:00 AM Issa Hanna MD [facility]EM [facility]   5/8/2024 11:00 AM Don Stevenson MD EMGENDO [facility]           Last OV note recommendation:    F01.B0) Moderate vascular dementia, unspecified whether behavioral, psychotic, or mood disturbance or anxiety (HCC)  (primary encounter diagnosis)        (M54.16) Right lumbar radiculopathy              Patient is a 82 year old female presenting with progressive memory loss likely vascular dementia     MRI brain shows mild microvascular changes and 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 Take pregabalin 25 mg TID  Side effect explained     Follow up in about 6 months

## 2024-02-23 NOTE — PROGRESS NOTES
Multiple attempts to reach the pt and messages left with no returned phone call. Past recommended HFU timeframe, closing encounter.

## 2024-03-25 RX ORDER — LENALIDOMIDE 5 MG/1
CAPSULE ORAL
Qty: 14 CAPSULE | Refills: 0 | Status: CANCELLED | OUTPATIENT
Start: 2024-03-25

## 2024-03-25 RX ORDER — LENALIDOMIDE 5 MG/1
CAPSULE ORAL
Qty: 14 CAPSULE | Refills: 0 | Status: SHIPPED | OUTPATIENT
Start: 2024-03-25

## 2024-03-25 RX ORDER — FOLIC ACID 1 MG/1
1 TABLET ORAL DAILY
Qty: 90 TABLET | Refills: 0 | Status: SHIPPED | OUTPATIENT
Start: 2024-03-25

## 2024-03-25 NOTE — TELEPHONE ENCOUNTER
LOV 12/4/2023    Future Appointments   Date Time Provider Department Center   4/12/2024 11:00 AM PF [facility] PF [facility]   4/24/2024 11:00 AM Issa Hanna MD  HEM [facility]   5/8/2024  9:30 AM Gladys Leon DO [id number] [facility]     Medication Quantity Refills Start End   folic acid 1 MG Oral Tab 90 tablet 0 12/22/2023 --

## 2024-04-01 RX ORDER — LENALIDOMIDE 5 MG/1
CAPSULE ORAL
Qty: 14 CAPSULE | Refills: 0 | Status: SHIPPED | OUTPATIENT
Start: 2024-04-01

## 2024-04-12 ENCOUNTER — HOSPITAL ENCOUNTER (OUTPATIENT)
Dept: ULTRASOUND IMAGING | Age: 84
Discharge: HOME | End: 2024-04-12
Attending: STUDENT IN AN ORGANIZED HEALTH CARE EDUCATION/TRAINING PROGRAM
Payer: MEDICAID

## 2024-04-12 DIAGNOSIS — E04.1 THYROID NODULE: ICD-10-CM

## 2024-04-12 PROCEDURE — 76536 US EXAM OF HEAD AND NECK: CPT | Performed by: STUDENT IN AN ORGANIZED HEALTH CARE EDUCATION/TRAINING PROGRAM

## 2024-04-16 RX ORDER — ROPINIROLE 1 MG/1
1 TABLET, FILM COATED ORAL NIGHTLY
Qty: 90 TABLET | Refills: 0 | Status: SHIPPED | OUTPATIENT
Start: 2024-04-16

## 2024-04-16 NOTE — TELEPHONE ENCOUNTER
Neurology Medications Oyfnvy32/15/2024 08:09 PM   Protocol Details In person appointment or virtual visit in the past 6 mos or appointment in next 3 mos        LOV 12/4/2023    Future Appointments   Date Time Provider Department Center   4/24/2024 11:00 AM Issa Hanna MD  [facility]   5/8/2024  9:30 AM Gladys Leon DO [id number] [facility]

## 2024-04-18 RX ORDER — ESCITALOPRAM OXALATE 20 MG/1
TABLET ORAL
Qty: 90 TABLET | Refills: 1 | Status: SHIPPED | OUTPATIENT
Start: 2024-04-18

## 2024-04-22 RX ORDER — ROSUVASTATIN CALCIUM 10 MG/1
10 TABLET, COATED ORAL NIGHTLY
Qty: 90 TABLET | Refills: 1 | Status: SHIPPED | OUTPATIENT
Start: 2024-04-22

## 2024-04-22 NOTE — TELEPHONE ENCOUNTER
Cholesterol Medication Protocol [id number]/21/2024 01:33 PM   Protocol Details Lipid panel within past 12 months    ALT < 80    ALT resulted within past year    In person appointment or virtual visit in the past 12 mos or appointment in next 3 mos        LOV 1/18/24     LAST LAB   n/a    LAST RX  10/25/23 90 with 1     Next OV   Future Appointments   Date Time Provider Department Center   4/24/2024 11:00 AM Issa Hanna MD  [facility]   5/8/2024  9:30 AM Gladys Leon DO [id number] [facility]          PROTOCOL failed       New pharmacy .

## 2024-04-23 NOTE — PROGRESS NOTES
[facility] Note    Diagnosis: Multiple Myeloma     Treatment History: Currently on Revlimid maintenance     Visit Diagnosis:  1. Multiple myeloma not having achieved remission (HCC)    2. Anemia, unspecified type      History of Present Illness: 82M with a PMH ESRD on HD, HTN, DM2 HLD, PVD and multiple myeloma presenting to establish care for his myeloma. He is on revlimid maintenance: Revlimid 5 mg D1-14 q28 days. He takes his Revlimid after HD on HD days.     Hematology History  Family notes that he was initially diagnosed with multiple myeloma around 2017. He was following with Dr. Rimma Jo. Unclear on staging, cytogenetics, bone marrow results.     Per notes, was initially [place] in [place] with RVD --> VCD and was later transitioned to Revlimid 5 mg D1-14 q28 days. He had been following with Dr. Mooer at [facility] since 10/2020. Due to insurance coverage, he switched to [place].     I met with the patients for the first time on 2/15/23. Above history was reviewed. He is doing well overall. He is on HD T/Th/S. He is due to start next revlimid on 2/19/23. He has this rx on hand. He feels well overall. He has no major complaints. Denies any bone pain. No fevers, night sweats or weight loss. Here with his daughter.     SPEP from 2/15/23: No M spike. Belt LC 43, Lambda LC 21, Ratio 2.01  SPEP from 3/22/23: No M spike. Belt LC 32, Lambda LC 20.6, Ratio 1.58  SPEP from 5/3/23: No M spike. Belt LC 42, Lambda LC 23, Ratio 1.83  SPEP from 09/02/23: No M spike, Belt LC 40, Lambda LC 22, Ratio 1.79    Was admitted from 11/24/24-11/28/23 for a clogged AVF.    SPEP from 12/2023: No M spike, Belt LC 26, Lambda LC 17, Ratio 1.5  SPEP from 01/2024: No M spike    Interval History:   -He has had some hypotensive episodes post HD. Also with some falls at home  -Hb running lower during HD. Stool negative for blood  -When he is on revlimid he does feel more fatigued     Review of Systems: 12  Point ROS was completed and pertinent positives are in the HPI    Current Outpatient Medications on File Prior to Visit   Medication Sig Dispense Refill    rosuvastatin 10 MG Oral Tab Take 1 tablet (10 mg total) by mouth nightly. 90 tablet 1    escitalopram 20 MG Oral Tab 20 mg. 1 tablet 90 tablet 1    rOPINIRole 1 MG Oral Tab Take 1 tablet (1 mg total) by mouth nightly. 90 tablet 0    Lenalidomide 5 MG Oral Cap TAKE 1 CAPSULE DAILY FOR 14 DAYS OF A 28 DAY CYCLE 14 capsule 0    folic acid 1 MG Oral Tab Take 1 tablet (1 mg total) by mouth daily. 90 tablet 0    pregabalin 25 MG Oral Cap Take 1 capsule (25 mg total) by mouth 3 (three) times daily. 90 capsule 2    albuterol 108 (90 Base) MCG/ACT Inhalation Aero Soln Inhale 2 puffs into the lungs every 6 (six) hours as needed for Wheezing. 1 each 0    Insulin Lispro Prot & Lispro (HUMALOG MIX 75/25 KWIKPEN) (75-25) 100 UNIT/ML SC SUPN Inject 5 Units into the skin in the morning and 5 Units before bedtime. Per sliding scale.      hydrALAZINE 10 MG Oral Tab Take 1 tablet (10 mg total) by mouth 3 (three) times daily as needed (if  or grater). Give 10-20 mg  Give 50 mg if  or above      Melatonin 5 MG Oral Tab Take 1 tablet (5 mg total) by mouth nightly.      Insulin Pen Needle (TRUEPLUS PEN NEEDLES) 32G X 4 MM Does not apply Misc 1 each by other route in the morning and 1 each before bedtime 200 each 3    Continuous Blood Gluc Sensor (DEXCOM G7 SENSOR) Does not apply Misc 1 each Every 10 days. 10 each 1    Glucose Blood (ONETOUCH VERIO) In Vitro Strip 1 each by Other route daily. 100 strip 1    amLODIPine 10 MG Oral Tab Take 1 tablet (10 mg total) by mouth daily. 90 tablet 1    losartan 100 MG Oral Tab Take 1 tablet (100 mg total) by mouth daily. 90 tablet 1    Continuous Blood Gluc Transmit (DEXCOM G6 TRANSMITTER) Does not apply Misc 1 each As Directed. Use with Dexcom G6 sensor. 1 every 90 days. 1 each 0    calcium acetate 667 MG Oral Cap Take 2 capsules  (1,334 mg total) by mouth 3 (three) times daily. 180 capsule 0    LATANOPROST OP Apply 1 drop to eye at bedtime. One drop to each eye nightly      cinacalcet 30 MG Oral Tab Take 1 tablet (30 mg total) by mouth daily with breakfast.      Insulin Pen Needle (TRUEPLUS 5-BEVEL PEN NEEDLES) 32G X 4 MM Does not apply Misc Test 2 times daily. 200 each 3    OneTouch Delica Lancets 33G Does not apply Misc Test once daily 100 each 3    Blood Glucose Monitoring Suppl (ONETOUCH VERIO) w/Device Does not apply Kit 1 kit by Does not apply route as needed. 1 kit 0    BABY ASPIRIN OR Take 81 mg by mouth daily.      docusate sodium 100 MG Oral Cap 1 capsule (100 mg total) 2 (two) times daily. PRN      benzonatate 200 MG Oral Cap Take 1 capsule (200 mg total) by mouth 3 (three) times daily as needed for cough. (Patient not taking: Reported on 4/24/2024) 30 capsule 0    HYDROcodone-acetaminophen (NORCO)  MG Oral Tab Take 1 tablet by mouth every 8 (eight) hours as needed for Pain. (Patient not taking: Reported on 4/24/2024) 30 tablet 0     No current facility-administered medications on file prior to visit.     Past Medical History:    Chronic kidney disease    stage 5    Diabetes (HCC)    ESRD (end stage renal disease) (HCC)    Essential hypertension    Glaucoma    High blood pressure    High cholesterol    History of blood transfusion    Multiple myeloma (HCC)    Myeloma (HCC)    Peripheral vascular disease (HCC)    Renal disorder     Past Surgical History:   Procedure Laterality Date    Appendectomy      Av fistula revision, open      Other      dialysis     Social History     Socioeconomic History    Marital status:    Tobacco Use    Smoking status: Never    Smokeless tobacco: Never   Vaping Use    Vaping status: Never Used   Substance and Sexual Activity    Alcohol use: Never    Drug use: Never      Family History   Problem Relation Age of Onset    Hypertension Father     Diabetes Father     Stroke Mother     Heart  Disorder Mother        Physical Exam  Height: --  Weight: 75.1 kg (165 lb 8 oz) (04/24 1054)  BSA (Calculated - sq m): --  Pulse: 80 (04/24 1054)  BP: 180/67 (04/24 1054)  Temp: 98.3 °F (36.8 °C) (04/24 1054)  Do Not Use - Resp Rate: --  SpO2: 96 % (04/24 1054)     General: NAD, AOX3  HEENT: clear op, mmm, no jvd, no scleral icterus  CV: RRR S1S2  Lungs CTAB   Extremities: No edema   Lungs:  no increased work of breathing  Neuro: CN: II-XII grossly intact    Results:  Lab Results   Component Value Date    WBC 4.5 02/07/2024    HGB 10.9 (L) 02/07/2024    HCT 31.5 (L) 02/07/2024    .6 (H) 02/07/2024    PLT 62.0 (L) 02/07/2024     Lab Results   Component Value Date     02/08/2024    K 4.9 02/08/2024    CO2 24.0 02/08/2024     02/08/2024    BUN 63 (H) 02/08/2024    PHOS 3.9 01/09/2024    ALB 4.0 02/07/2024       Lab Results   Component Value Date     05/06/2022       Radiology: bone survey-negative     CT CAP 10/2023  1. Abnormal appearance of the osseous structures consistent with multiple myeloma involvement.  Mild anterior wedge deformity of T10 may be secondary to pathologic compression injury, temporally indeterminate.  No discrete fracture lines.   2. Cholelithiasis.   3. Details as above.  Continued clinical correlation recommended.     Pathology: OSH records reviewed     Assessment and Plan:  Multiple Myeloma   -Unclear on details of previous treatment in [place]  -Continue maintenance Revlimid 5 mg D1-14 q28 days. Takes revlimid after HD on HD Days   -ASA 81 mg daily  -q3 month CBC, CMP, SPEP    Bone health: avoid zometa. Skeletal survey was negative. Will start Xgeva if he develops osseous lesions.     Anemia: EPO with HD. His Hb has been running lower. Stool negative for blood. This could be from Revlimid. We will repeat  FE studies, B12, Folate levels. Discussed PRN transfusions if Hb is < 7. IF significantly worse, we could consider a repeat bone marrow. However, most recent SPEP  normal. Repeat SPEP is pending     ESRD: on HD    HTN/DM2: per PCP    Neuropathy: following with Neurology. On Lyrica     RTC in 3 months     [facility]

## 2024-04-24 ENCOUNTER — OFFICE VISIT (OUTPATIENT)
Dept: HEMATOLOGY/ONCOLOGY | Facility: HOSPITAL | Age: 84
End: 2024-04-24
Attending: INTERNAL MEDICINE
Payer: MEDICAID

## 2024-04-24 VITALS
OXYGEN SATURATION: 96 % | RESPIRATION RATE: 16 BRPM | DIASTOLIC BLOOD PRESSURE: 67 MMHG | WEIGHT: 165.5 LBS | BODY MASS INDEX: 24 KG/M2 | HEART RATE: 80 BPM | TEMPERATURE: 98 F | SYSTOLIC BLOOD PRESSURE: 180 MMHG

## 2024-04-24 DIAGNOSIS — C90.00 MULTIPLE MYELOMA NOT HAVING ACHIEVED REMISSION (HCC): Primary | ICD-10-CM

## 2024-04-24 DIAGNOSIS — D64.9 ANEMIA, UNSPECIFIED TYPE: ICD-10-CM

## 2024-04-24 LAB
ALBUMIN SERPL-MCNC: 3.4 G/DL (ref 3.4–5)
ALBUMIN/GLOB SERPL: 0.9 {RATIO} (ref 1–2)
ALP LIVER SERPL-CCNC: 67 U/L
ALT SERPL-CCNC: 17 U/L
ANION GAP SERPL CALC-SCNC: 6 MMOL/L (ref 0–18)
AST SERPL-CCNC: 5 U/L (ref 15–37)
BASOPHILS # BLD AUTO: 0.01 X10(3) UL (ref 0–0.2)
BASOPHILS NFR BLD AUTO: 0.3 %
BILIRUB SERPL-MCNC: 0.5 MG/DL (ref 0.1–2)
BUN BLD-MCNC: 55 MG/DL (ref 9–23)
CALCIUM BLD-MCNC: 8.9 MG/DL (ref 8.5–10.1)
CHLORIDE SERPL-SCNC: 105 MMOL/L (ref 98–112)
CO2 SERPL-SCNC: 26 MMOL/L (ref 21–32)
CREAT BLD-MCNC: 6.93 MG/DL
DEPRECATED HBV CORE AB SER IA-ACNC: 894.8 NG/ML
EGFRCR SERPLBLD CKD-EPI 2021: 7 ML/MIN/1.73M2 (ref 60–?)
EOSINOPHIL # BLD AUTO: 0.22 X10(3) UL (ref 0–0.7)
EOSINOPHIL NFR BLD AUTO: 6 %
ERYTHROCYTE [DISTWIDTH] IN BLOOD BY AUTOMATED COUNT: 16.6 %
FOLATE SERPL-MCNC: 49.5 NG/ML (ref 8.7–?)
GLOBULIN PLAS-MCNC: 3.9 G/DL (ref 2.8–4.4)
GLUCOSE BLD-MCNC: 189 MG/DL (ref 70–99)
HCT VFR BLD AUTO: 24.4 %
HGB BLD-MCNC: 7.7 G/DL
HGB RETIC QN AUTO: 35.1 PG (ref 28.2–36.6)
IMM GRANULOCYTES # BLD AUTO: 0.03 X10(3) UL (ref 0–1)
IMM GRANULOCYTES NFR BLD: 0.8 %
IMM RETICS NFR: 0.36 RATIO (ref 0.1–0.3)
IRON SATN MFR SERPL: 19 %
IRON SERPL-MCNC: 48 UG/DL
LYMPHOCYTES # BLD AUTO: 0.94 X10(3) UL (ref 1–4)
LYMPHOCYTES NFR BLD AUTO: 25.8 %
MCH RBC QN AUTO: 34.4 PG (ref 26–34)
MCHC RBC AUTO-ENTMCNC: 31.6 G/DL (ref 31–37)
MCV RBC AUTO: 108.9 FL
MONOCYTES # BLD AUTO: 0.24 X10(3) UL (ref 0.1–1)
MONOCYTES NFR BLD AUTO: 6.6 %
NEUTROPHILS # BLD AUTO: 2.21 X10 (3) UL (ref 1.5–7.7)
NEUTROPHILS # BLD AUTO: 2.21 X10(3) UL (ref 1.5–7.7)
NEUTROPHILS NFR BLD AUTO: 60.5 %
OSMOLALITY SERPL CALC.SUM OF ELEC: 304 MOSM/KG (ref 275–295)
PLATELET # BLD AUTO: 95 10(3)UL (ref 150–450)
POTASSIUM SERPL-SCNC: 4.8 MMOL/L (ref 3.5–5.1)
PROT SERPL-MCNC: 7.3 G/DL (ref 6.4–8.2)
RBC # BLD AUTO: 2.24 X10(6)UL
RETICS # AUTO: 138.2 X10(3) UL (ref 22.5–147.5)
RETICS/RBC NFR AUTO: 6.2 %
SODIUM SERPL-SCNC: 137 MMOL/L (ref 136–145)
TIBC SERPL-MCNC: 255 UG/DL (ref 240–450)
TRANSFERRIN SERPL-MCNC: 171 MG/DL (ref 200–360)
VIT B12 SERPL-MCNC: 760 PG/ML (ref 193–986)
WBC # BLD AUTO: 3.7 X10(3) UL (ref 4–11)

## 2024-04-24 PROCEDURE — 99215 OFFICE O/P EST HI 40 MIN: CPT | Performed by: INTERNAL MEDICINE

## 2024-04-24 NOTE — PROGRESS NOTES
Patient here for follow-up. Remains on Revlimid 5 mg. Started off week this past Saturday 4/20/24. Feeling more weak than usual. Daughter states he fell two times. Denies any head injury, and believes he was hypotensive post dialysis. Neuropathy remains unchanged.

## 2024-04-25 LAB
ALBUMIN SERPL ELPH-MCNC: 4.05 G/DL (ref 3.75–5.21)
ALBUMIN/GLOB SERPL: 1.42 {RATIO} (ref 1–2)
ALPHA1 GLOB SERPL ELPH-MCNC: 0.35 G/DL (ref 0.19–0.46)
ALPHA2 GLOB SERPL ELPH-MCNC: 0.75 G/DL (ref 0.48–1.05)
B-GLOBULIN SERPL ELPH-MCNC: 0.72 G/DL (ref 0.68–1.23)
GAMMA GLOB SERPL ELPH-MCNC: 1.04 G/DL (ref 0.62–1.7)
KAPPA LC FREE SER-MCNC: 41.6 MG/DL (ref 0.33–1.94)
KAPPA LC FREE/LAMBDA FREE SER NEPH: 1.63 {RATIO} (ref 0.26–1.65)
LAMBDA LC FREE SERPL-MCNC: 25.46 MG/DL (ref 0.57–2.63)
PROT SERPL-MCNC: 6.9 G/DL (ref 5.7–8.2)

## 2024-05-05 RX ORDER — LENALIDOMIDE 5 MG/1
CAPSULE ORAL
Qty: 14 CAPSULE | Refills: 0 | Status: CANCELLED | OUTPATIENT
Start: 2024-05-05

## 2024-05-06 RX ORDER — LENALIDOMIDE 5 MG/1
CAPSULE ORAL
Qty: 14 CAPSULE | Refills: 0 | Status: SHIPPED | OUTPATIENT
Start: 2024-05-06

## 2024-05-08 ENCOUNTER — OFFICE VISIT (OUTPATIENT)
Facility: CLINIC | Age: 84
End: 2024-05-08
Payer: MEDICAID

## 2024-05-08 VITALS
HEIGHT: 69.02 IN | HEART RATE: 84 BPM | OXYGEN SATURATION: 97 % | WEIGHT: 170 LBS | DIASTOLIC BLOOD PRESSURE: 90 MMHG | BODY MASS INDEX: 25.18 KG/M2 | SYSTOLIC BLOOD PRESSURE: 164 MMHG

## 2024-05-08 DIAGNOSIS — E11.22 TYPE 2 DIABETES MELLITUS WITH CHRONIC KIDNEY DISEASE ON CHRONIC DIALYSIS, WITH LONG-TERM CURRENT USE OF INSULIN (HCC): Primary | ICD-10-CM

## 2024-05-08 DIAGNOSIS — N18.6 TYPE 2 DIABETES MELLITUS WITH CHRONIC KIDNEY DISEASE ON CHRONIC DIALYSIS, WITH LONG-TERM CURRENT USE OF INSULIN (HCC): Primary | ICD-10-CM

## 2024-05-08 DIAGNOSIS — Z79.4 TYPE 2 DIABETES MELLITUS WITH CHRONIC KIDNEY DISEASE ON CHRONIC DIALYSIS, WITH LONG-TERM CURRENT USE OF INSULIN (HCC): Primary | ICD-10-CM

## 2024-05-08 DIAGNOSIS — Z99.2 TYPE 2 DIABETES MELLITUS WITH CHRONIC KIDNEY DISEASE ON CHRONIC DIALYSIS, WITH LONG-TERM CURRENT USE OF INSULIN (HCC): Primary | ICD-10-CM

## 2024-05-08 LAB — HEMOGLOBIN A1C: 5 % (ref 4.3–5.6)

## 2024-05-08 PROCEDURE — 83036 HEMOGLOBIN GLYCOSYLATED A1C: CPT | Performed by: STUDENT IN AN ORGANIZED HEALTH CARE EDUCATION/TRAINING PROGRAM

## 2024-05-08 PROCEDURE — 99214 OFFICE O/P EST MOD 30 MIN: CPT | Performed by: STUDENT IN AN ORGANIZED HEALTH CARE EDUCATION/TRAINING PROGRAM

## 2024-05-08 RX ORDER — INSULIN LISPRO 100 [IU]/ML
5 INJECTION, SUSPENSION SUBCUTANEOUS 2 TIMES DAILY
Qty: 3 ML | Refills: 1 | Status: SHIPPED | OUTPATIENT
Start: 2024-05-08

## 2024-05-08 RX ORDER — PEN NEEDLE, DIABETIC 32GX 5/32"
NEEDLE, DISPOSABLE MISCELLANEOUS
Qty: 200 EACH | Refills: 3 | Status: SHIPPED | OUTPATIENT
Start: 2024-05-08

## 2024-05-08 RX ORDER — ACYCLOVIR 400 MG/1
1 TABLET ORAL
Qty: 10 EACH | Refills: 3 | Status: SHIPPED | OUTPATIENT
Start: 2024-05-08

## 2024-05-08 NOTE — PROGRESS NOTES
[facility] Note    Name: Sarika Rob    Date: 5/8/2024    HISTORY OF PRESENT ILLNESS   Sarika Rob is a 83 year old male with PMHx significant for insulin-dependent DM2 with retinopathy and neuropathy, ESRD on HD who presents for  DM2 mgmt.    Initial HPI consult in Oct 2023:  Chief Complaint   Patient presents with    Consult     PT here for routine f/u, no current concerns or sx. Pt here with daughter, daughter states she noticed improvements- not much insulin has been needed.    Diabetes     - Continue humalog 75/25 qAC BID (usually 5-6U depending on meals; consider lowering by 1U for 2nd dose of the day if having repeatedly late night hypoglycemia  - rarely needs mealtime insulin for lunch; will order fast-acting prn  - Dexcom G6-> G7   Previously with [facility], recent insurance changed  [facility] 6/2023    Last A1c value was 5.8% (from labs drawn at dialysis in 10/2023)    Diagnosed age: 21yrs  Pt denies hx of hospitalization for DM and/or pancreatitis: none  Family history of DM: both sides    DM meds at first office visit:  Humalog 75/25 BID 5-20U BID depending on mealtime; they mostly use no more than 10U per dose (if FBG <90, none; if FBG >100 and he plans to get a good meal, then 5U; if premeal 170-180, then 6U)  Occ uses fiasp for lunch (1-2U)  Generally doesn't give on HD days because he eats less and more tired    DM Meds: Insulin Lispro Prot & Lispro Supn - (75-25) 100 UNIT/ML     Previously trialed   Metformin (pre-HD)    Interval history 5/8/2024:  -Patient here accompanied by daughter  -Reports overall doing well, using insulin only at night and only at 2-3 units, withholds if sugar <100  -Reports occasional low sugar alarms overnight, sugars in 60s per sensor (does not confirm with fingerstick)  -Dexcom not linked to clinic today, provided pt's daughter with info to connect so CGM data can be reviewed    REVIEW OF SYSTEMS  Ten point review of systems has been performed and is  otherwise negative and/or non-contributory, except as described above.     Medications:     Current Outpatient Medications:     Continuous Glucose Sensor (DEXCOM G7 SENSOR) Does not apply Misc, 1 each Every 10 days., Disp: 10 each, Rfl: 3    Insulin Pen Needle (TRUEPLUS PEN NEEDLES) 32G X 4 MM Does not apply Misc, 1 each by other route in the morning and 1 each before bedtime, Disp: 200 each, Rfl: 3    Insulin Lispro Prot & Lispro (HUMALOG MIX 75/25 KWIKPEN) (75-25) 100 UNIT/ML SC SUPN, Inject 5 Units into the skin in the morning and 5 Units before bedtime. Per sliding scale., Disp: 3 mL, Rfl: 1    Lenalidomide 5 MG Oral Cap, TAKE 1 CAPSULE DAILY FOR 14 DAYS OF A 28 DAY CYCLE, Disp: 14 capsule, Rfl: 0    rosuvastatin 10 MG Oral Tab, Take 1 tablet (10 mg total) by mouth nightly., Disp: 90 tablet, Rfl: 1    escitalopram 20 MG Oral Tab, 20 mg. 1 tablet, Disp: 90 tablet, Rfl: 1    rOPINIRole 1 MG Oral Tab, Take 1 tablet (1 mg total) by mouth nightly., Disp: 90 tablet, Rfl: 0    folic acid 1 MG Oral Tab, Take 1 tablet (1 mg total) by mouth daily., Disp: 90 tablet, Rfl: 0    pregabalin 25 MG Oral Cap, Take 1 capsule (25 mg total) by mouth 3 (three) times daily., Disp: 90 capsule, Rfl: 2    benzonatate 200 MG Oral Cap, Take 1 capsule (200 mg total) by mouth 3 (three) times daily as needed for cough., Disp: 30 capsule, Rfl: 0    albuterol 108 (90 Base) MCG/ACT Inhalation Aero Soln, Inhale 2 puffs into the lungs every 6 (six) hours as needed for Wheezing., Disp: 1 each, Rfl: 0    hydrALAZINE 10 MG Oral Tab, Take 1 tablet (10 mg total) by mouth 3 (three) times daily as needed (if  or grater). Give 10-20 mg Give 50 mg if  or above, Disp: , Rfl:     Melatonin 5 MG Oral Tab, Take 1 tablet (5 mg total) by mouth nightly., Disp: , Rfl:     Glucose Blood (ONETOUCH VERIO) In Vitro Strip, 1 each by Other route daily., Disp: 100 strip, Rfl: 1    amLODIPine 10 MG Oral Tab, Take 1 tablet (10 mg total) by mouth daily., Disp:  90 tablet, Rfl: 1    losartan 100 MG Oral Tab, Take 1 tablet (100 mg total) by mouth daily., Disp: 90 tablet, Rfl: 1    Continuous Blood Gluc Transmit (DEXCOM G6 TRANSMITTER) Does not apply Misc, 1 each As Directed. Use with Dexcom G6 sensor. 1 every 90 days., Disp: 1 each, Rfl: 0    calcium acetate 667 MG Oral Cap, Take 2 capsules (1,334 mg total) by mouth 3 (three) times daily., Disp: 180 capsule, Rfl: 0    HYDROcodone-acetaminophen (NORCO)  MG Oral Tab, Take 1 tablet by mouth every 8 (eight) hours as needed for Pain., Disp: 30 tablet, Rfl: 0    LATANOPROST OP, Apply 1 drop to eye at bedtime. One drop to each eye nightly, Disp: , Rfl:     cinacalcet 30 MG Oral Tab, Take 1 tablet (30 mg total) by mouth daily with breakfast., Disp: , Rfl:     Insulin Pen Needle (TRUEPLUS 5-BEVEL PEN NEEDLES) 32G X 4 MM Does not apply Misc, Test 2 times daily., Disp: 200 each, Rfl: 3    OneTouch Delica Lancets 33G Does not apply Misc, Test once daily, Disp: 100 each, Rfl: 3    Blood Glucose Monitoring Suppl (ONETOUCH VERIO) w/Device Does not apply Kit, 1 kit by Does not apply route as needed., Disp: 1 kit, Rfl: 0    BABY ASPIRIN OR, Take 81 mg by mouth daily., Disp: , Rfl:     docusate sodium 100 MG Oral Cap, 1 capsule (100 mg total) 2 (two) times daily. PRN, Disp: , Rfl:      Allergies:   No Known Allergies    Social History:   Social History     Socioeconomic History    Marital status:    Tobacco Use    Smoking status: Never    Smokeless tobacco: Never   Vaping Use    Vaping status: Never Used   Substance and Sexual Activity    Alcohol use: Never    Drug use: Never   Other Topics Concern    Caffeine Concern No    Exercise No    Seat Belt Yes       Medical History:   Reviewed    Surgical history:   Past Surgical History:   Procedure Laterality Date    Appendectomy      Av fistula revision, open      Other      dialysis         PHYSICAL EXAM  Vitals:    05/08/24 0927   BP: (!) 164/90   Pulse: 84   SpO2: 97%   Weight: 170  lb (77.1 kg)   Height: 5' 9.02\" (1.753 m)       General Appearance:  alert, well developed, in no acute distress  Eyes:  normal conjunctivae, sclera  Ears/Nose/Mouth/Throat/Neck:  ?small L thyroid nodule  Cardiovascular:  regular rate  Respiratory:  breathing comfortably  Skin:  normal moisture and skin texture   Psychiatric:  oriented to time, self, and place  Neuro:  sensory grossly intact and motor grossly intact    Labs/Imaging: Reviewed.      ASSESSMENT/PLAN:      ICD-10-CM    1. Type 2 diabetes mellitus with chronic kidney disease on chronic dialysis, with long-term current use of insulin ([facility])  E11.22 POC Hgb A1C    N18.6 Continuous Glucose Sensor (DEXCOM G7 SENSOR) Does not apply Misc    Z99.2 Insulin Pen Needle (TRUEPLUS PEN NEEDLES) 32G X 4 MM Does not apply Misc    Z79.4 Insulin Lispro Prot & Lispro (HUMALOG MIX 75/25 KWIKPEN) (75-25) 100 UNIT/ML SC SUPN        -Reviewed multifactorial nature of successful glycemic control involving pharmacotherapy, diet and carbohydrate management, and optimization of physical activity. Reviewed goals of therapy with DM and rationale of A1C/glycemic goals in prevention of development/progression of organ damage and diabetes complications.  -Pt on premixed insulin only at night and reporting hypoglycemia overnight requiring overnight correction  -Often not using insulin during the day and rarely uses on HD days  -Will review CGM data when linked to clinic as above  -As pt reports only using 2-3 units of insulin at night and reporting hypoglycemia with it, discussed revisiting necessity of insulin. The patient's A1C is 5.0%. Though this is likely underestimated due to ESRD, even an adjusted A1C is likely below goal for this 81-year old patient with high risk of complications from hypoglycemia.   -Discussed with patient and daughter and will plan to hold insulin fully for a few days-a week to review CGM trends. If BG does not rebound significantly, consider discontinuing  insulin and starting a renal-safe medication in its place such as Januvia or Tradjenta. Alternatively, could consider short-acting insulin with sugary meals only.   -Per daughter, will plan to hold insulin under family supervision starting 5/20. Will schedule check-in with CDE to review trends, based on results will suggest management going forward.   -Refilled supplies today    - Dexcom G6-> G7  - Ophtho: +DR; follow Dr Beverly for inj/laser  - BP controlled, on ARB  - LDL at goal, on statin    - ESRD on HD, follows Dr Mcmahon  - Neuropathy/ Foot exam: constant; sees podiatrist q2 months  - CAD: none    The above plan was discussed in detail with the patient who verbalized understanding and agreement.      A total of 35 minutes was spent today on obtaining history, reviewing pertinent labs, reviewing relevant pathophysiology with patient, evaluating patient, providing multiple treatment options, and completing documentation and orders.      Gladys Leon DO  [facility] End[facility]  5/8/2024     Note to patient: The 21 Century Cures Act makes medical notes like these available to patients in the interest of transparency. However, be advised this is a medical document. It is intended as peer to peer communication. It is written in medical language and may contain abbreviations or verbiage that are unfamiliar. It may appear blunt or direct. Medical documents are intended to carry relevant information, facts as evident, and the clinical opinion of the practitioner.

## 2024-05-08 NOTE — PATIENT INSTRUCTIONS
I will link your Dexcom and review it and let you know if any changes to the plan.   Otherwise, if you're taking so little insulin and only at night, it may be that you no longer need it at all OR that nighttime is not the best time to take it. So to evaluate that, the week of 5/20 we'll have you hold the insulin entirely for 2-3 days and then see what happens with your sugars on the sensor.   If it's not very different without the insulin OR if we see that there are different times in the day when the sugar is higher or lower, I might suggest that you either stop taking insulin or take a different type of it.   Depending on how things look, I may suggest that you consider switching to oral medication that is more stable (Tradjenta or Januvia).     Return Visit   [X] Physician in 3 months  [X] Diabetes Education:    [X] Please schedule BG check-in call with [place] on 5/23

## 2024-05-16 RX ORDER — ALBUTEROL SULFATE 90 UG/1
2 AEROSOL, METERED RESPIRATORY (INHALATION) EVERY 6 HOURS PRN
Qty: 1 EACH | Refills: 0 | Status: SHIPPED | OUTPATIENT
Start: 2024-05-16

## 2024-05-16 NOTE — TELEPHONE ENCOUNTER
Please review. Protocol Failed; No Protocol    Requested Prescriptions   Pending Prescriptions Disp Refills    albuterol 108 (90 Base) MCG/ACT Inhalation Aero Soln 1 each 0     Sig: Inhale 2 puffs into the lungs every 6 (six) hours as needed for Wheezing.       Asthma & COPD Medication Protocol Failed - 5/14/2024  5:16 AM        Failed - Asthma Action Score greater than or equal to 20        Failed - AAP/ACT given in last 12 months     No data recorded  No data recorded  No data recorded  No data recorded          Passed - Appointment in past 6 or next 3 months      Recent Outpatient Visits              1 week ago Type 2 diabetes mellitus with chronic kidney disease on chronic dialysis, with long-term current use of insulin ([facility])    [facility] Gladys Leon DO    Office Visit    3 weeks ago Multiple myeloma not having achieved remission ([facility])    [facility]    [facility] Visit    3 months ago Multiple myeloma not having achieved remission ([facility])    [facility]    Office Visit    5 months ago ESRD on hemodialysis ([facility])    [address]    Office Visit    6 months ago Encounter for annual physical exam    [address] Baltazar Fuller MD    Office Visit          Future Appointments         Provider Department Appt Notes    In 1 week EMG DIABETIC EDUCATOR ENDO [facility] BG check-in call with Tyson on 5/23  LOV 5/8/24 jj/ Carolyn    In 3 months Gladys Leon DO [facility] **Dr. Stevenson patient**  FU 3 months / Type 2 diabetes mellitus with chronic kidney disease on chronic dialysis, with long-term current use of insulin**Dexcom invite sent via email to connect to clinic**  LOV 5/8/24 w/  Carolyn  **45 min modifier                           Future Appointments         Provider Department Appt Notes    In 1 week EMG DIABETIC EDUCATOR ENDO [facility] BG check-in call with Tyson on 5/23  LOV 5/8/24 w/ Carolyn    In 3 months Gladys Leon DO [facility] **Dr. Stevenson patient**  FU 3 months / Type 2 diabetes mellitus with chronic kidney disease on chronic dialysis, with long-term current use of insulin**Dexcom invite sent via email to connect to clinic**  LOV 5/8/24 w/ Carolyn  **45 min modifier          Recent Outpatient Visits              1 week ago Type 2 diabetes mellitus with chronic kidney disease on chronic dialysis, with long-term current use of insulin ([facility])    [facility] Gladys Leon DO    Office Visit    3 weeks ago Multiple myeloma not having achieved remission ([facility])    [facility]    Office Visit    3 months ago Multiple myeloma not having achieved remission ([facility])    [facility]    Office Visit    5 months ago ESRD on hemodialysis ([facility])    [facility], [address] [place] [address], Baltazar Fuller MD    Office Visit    6 months ago Encounter for annual physical exam    [facility], [address] [place] [address] Baltazar Fuller MD    Office Visit

## 2024-05-18 DIAGNOSIS — G62.9 PERIPHERAL POLYNEUROPATHY: ICD-10-CM

## 2024-05-21 RX ORDER — LOSARTAN POTASSIUM 100 MG/1
100 TABLET ORAL DAILY
Qty: 90 TABLET | Refills: 3 | Status: SHIPPED | OUTPATIENT
Start: 2024-05-21

## 2024-05-21 RX ORDER — AMLODIPINE BESYLATE 10 MG/1
10 TABLET ORAL DAILY
Qty: 90 TABLET | Refills: 3 | Status: SHIPPED | OUTPATIENT
Start: 2024-05-21

## 2024-05-21 RX ORDER — FOLIC ACID 1 MG/1
1 TABLET ORAL DAILY
Qty: 90 TABLET | Refills: 3 | Status: SHIPPED | OUTPATIENT
Start: 2024-05-21

## 2024-05-21 RX ORDER — HYDRALAZINE HYDROCHLORIDE 10 MG/1
10 TABLET, FILM COATED ORAL 3 TIMES DAILY PRN
Qty: 60 TABLET | Refills: 3 | Status: SHIPPED | OUTPATIENT
Start: 2024-05-21

## 2024-05-21 RX ORDER — PREGABALIN 25 MG/1
25 CAPSULE ORAL 3 TIMES DAILY
Qty: 90 CAPSULE | Refills: 2 | Status: SHIPPED | OUTPATIENT
Start: 2024-05-21

## 2024-05-21 NOTE — TELEPHONE ENCOUNTER
Please review. Rx failed/no protocol.    Hydralazine 10 mg is a patient reported medication    Requested Prescriptions   Pending Prescriptions Disp Refills    amLODIPine 10 MG Oral Tab 90 tablet 1     Sig: Take 1 tablet (10 mg total) by mouth daily.       Hypertension Medications Protocol Failed - 5/18/2024  9:16 PM        Failed - Last BP reading less than 140/90     BP Readings from Last 1 Encounters:   05/08/24 (!) 164/90               Failed - EGFRCR or GFRNAA > 50     GFR Evaluation  EGFRCR: 7 , resulted on 4/24/2024          Passed - CMP or BMP in past 12 months        Passed - In person appointment or virtual visit in the past 12 mos or appointment in next 3 mos     Recent Outpatient Visits              1 week ago Type 2 diabetes mellitus with chronic kidney disease on chronic dialysis, with long-term current use of insulin ([facility])    [facility] Gladys Leon DO    Office Visit    3 weeks ago Multiple myeloma not having achieved remission ([facility])    [facility]    Office Visit    3 months ago Multiple myeloma not having achieved remission ([facility])    [facility]    Office Visit    5 months ago ESRD on hemodialysis ([facility])    [facility], [address] [place] [address] Baltazar Fuller MD    Office Visit    6 months ago Encounter for annual physical exam    [facility], [address] [place] [address] Baltazar Fuller MD    Office Visit          Future Appointments         Provider Department Appt Notes    In 2 days EMG DIABETIC EDUCATOR ENDO [facility] BG check-in call with Tyson on 5/23  LOV 5/8/24 w/ Carolyn    In 2 months Gladys Leon DO [facility] **Dr. Stevenson patient**  FU 3 months / Type 2 diabetes mellitus with chronic kidney disease on chronic dialysis, with  long-term current use of insulin**Dexcom invite sent via email to connect to clinic**  LOV 5/8/24 w/ Carolyn  **45 min modifier                      losartan 100 MG Oral Tab 90 tablet 1     Sig: Take 1 tablet (100 mg total) by mouth daily.       Hypertension Medications Protocol Failed - 5/18/2024  9:16 PM        Failed - Last BP reading less than 140/90     BP Readings from Last 1 Encounters:   05/08/24 (!) 164/90               Failed - EGFRCR or GFRNAA > 50     GFR Evaluation  EGFRCR: 7 , resulted on 4/24/2024          Passed - CMP or BMP in past 12 months        Passed - In person appointment or virtual visit in the past 12 mos or appointment in next 3 mos     Recent Outpatient Visits              1 week ago Type 2 diabetes mellitus with chronic kidney disease on chronic dialysis, with long-term current use of insulin ([facility])    [facility] Gladys Leon DO    Office Visit    3 weeks ago Multiple myeloma not having achieved remission ([facility])    [facility]    Office Visit    3 months ago Multiple myeloma not having achieved remission ([facility])    [facility]    Office Visit    5 months ago ESRD on hemodialysis ([facility])    [address] Baltazar Baker MD    Office Visit    6 months ago Encounter for annual physical exam    [address] Baltazar Fuller MD    Office Visit          Future Appointments         Provider Department Appt Notes    In 2 days EMG DIABETIC EDUCATOR ENDO [facility] BG check-in call with Tyson on 5/23  LOV 5/8/24 jj/ Carolyn    In 2 months Gladys Leon DO [facility] **Dr. Stevenson patient**  FU 3 months / Type 2 diabetes mellitus with chronic kidney disease on chronic dialysis, with long-term  current use of insulin**Dexcom invite sent via email to connect to clinic**  LOV 5/8/24 w/ Carolyn  **45 min modifier                      hydrALAZINE 10 MG Oral Tab  0     Sig: Take 1 tablet (10 mg total) by mouth 3 (three) times daily as needed (if  or grater). Give 10-20 mg  Give 50 mg if  or above       Hypertension Medications Protocol Failed - 5/18/2024  9:16 PM        Failed - Last BP reading less than 140/90     BP Readings from Last 1 Encounters:   05/08/24 (!) 164/90               Failed - EGFRCR or GFRNAA > 50     GFR Evaluation  EGFRCR: 7 , resulted on 4/24/2024          Passed - CMP or BMP in past 12 months        Passed - In person appointment or virtual visit in the past 12 mos or appointment in next 3 mos     Recent Outpatient Visits              1 week ago Type 2 diabetes mellitus with chronic kidney disease on chronic dialysis, with long-term current use of insulin ([facility])    [facility] Gladys Leon DO    Office Visit    3 weeks ago Multiple myeloma not having achieved remission ([facility])    [facility]    Office Visit    3 months ago Multiple myeloma not having achieved remission ([facility])    [facility]    Office Visit    5 months ago ESRD on hemodialysis ([facility])    [address] Baltazar Baker MD    Office Visit    6 months ago Encounter for annual physical exam    [address] Baltazar Baker MD    Office Visit          Future Appointments         Provider Department Appt Notes    In 2 days EMG DIABETIC EDUCATOR ENDO [facility] BG check-in call with Tyson on 5/23  LOV 5/8/24 jj/ Carolyn    In 2 months Gladys Leon DO [facility] **Dr. Stevenson patient**  FU 3 months / Type 2  diabetes mellitus with chronic kidney disease on chronic dialysis, with long-term current use of insulin**Dexcom invite sent via email to connect to clinic**  LOV 5/8/24 w/ Carolyn  **45 min modifier                      folic acid 1 MG Oral Tab 90 tablet 0     Sig: Take 1 tablet (1 mg total) by mouth daily.       There is no refill protocol information for this order          Future Appointments         Provider Department Appt Notes    In 2 days EMG DIABETIC EDUCATOR ENDO [facility] BG check-in call with Tyson on 5/23  LOV 5/8/24 w/ Carolyn    In 2 months Gladys Leon DO [facility] **Dr. Stevenson patient**  FU 3 months / Type 2 diabetes mellitus with chronic kidney disease on chronic dialysis, with long-term current use of insulin**Dexcom invite sent via email to connect to clinic**  LOV 5/8/24 w/ Carolyn  **45 min modifier          Recent Outpatient Visits              1 week ago Type 2 diabetes mellitus with chronic kidney disease on chronic dialysis, with long-term current use of insulin ([facility])    [facility] Gladys Leon DO    Office Visit    3 weeks ago Multiple myeloma not having achieved remission ([facility])    [facility]    Office Visit    3 months ago Multiple myeloma not having achieved remission ([facility])    [facility]    Office Visit    5 months ago ESRD on hemodialysis ([facility])    [facility], [address] [place], [place] [address]Baltazar Mack MD    Office Visit    6 months ago Encounter for annual physical exam    [facility], [address] [place] [address] Baltazar Fuller MD    Office Visit

## 2024-05-21 NOTE — TELEPHONE ENCOUNTER
Medication: pregabalin 25 MG Oral Cap      Date of last refill: 02/15/2024 (#90/2)  [place]     Last office visit: 08/14/2023  Due back to clinic per last office note:  Around 02/14/2024  Date next office visit scheduled:    Future Appointments   Date Time Provider Department Center   5/23/2024 11:30 AM EMG DIABETIC EDUCATOR ENDO [facility] [place]   8/16/2024 12:30 PM Gladys Leon DO [id number] [place]           Last OV note recommendation:    ASSESSMENT/PLAN:      (F01.B0) Moderate vascular dementia, unspecified whether behavioral, psychotic, or mood disturbance or anxiety (HCC)  (primary encounter diagnosis)        (M54.16) Right lumbar radiculopathy              Patient is a 82 year old female presenting with progressive memory loss likely vascular dementia     MRI brain shows mild microvascular changes and 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 Take pregabalin 25 mg TID  Side effect explained     Follow up in about 6 months     See orders and medications filed with this encounter. The patient indicates understanding of these issues and agrees with the plan.        Steve Jaquez MD  [facility] [place]

## 2024-05-23 ENCOUNTER — NURSE ONLY (OUTPATIENT)
Facility: CLINIC | Age: 84
End: 2024-05-23

## 2024-05-23 DIAGNOSIS — Z99.2 TYPE 2 DIABETES MELLITUS WITH CHRONIC KIDNEY DISEASE ON CHRONIC DIALYSIS, WITH LONG-TERM CURRENT USE OF INSULIN (HCC): Primary | ICD-10-CM

## 2024-05-23 DIAGNOSIS — Z79.4 TYPE 2 DIABETES MELLITUS WITH CHRONIC KIDNEY DISEASE ON CHRONIC DIALYSIS, WITH LONG-TERM CURRENT USE OF INSULIN (HCC): Primary | ICD-10-CM

## 2024-05-23 DIAGNOSIS — N18.6 TYPE 2 DIABETES MELLITUS WITH CHRONIC KIDNEY DISEASE ON CHRONIC DIALYSIS, WITH LONG-TERM CURRENT USE OF INSULIN (HCC): Primary | ICD-10-CM

## 2024-05-23 DIAGNOSIS — E11.22 TYPE 2 DIABETES MELLITUS WITH CHRONIC KIDNEY DISEASE ON CHRONIC DIALYSIS, WITH LONG-TERM CURRENT USE OF INSULIN (HCC): Primary | ICD-10-CM

## 2024-05-23 NOTE — PROGRESS NOTES
Continuous Glucose Monitor Download - Dexcom    Patient: Sarika Rob  : 10/3/1940    Referred by: Gladys Leon DO    Indication:  Checking in with [facility] after trial off of insulin     Current Diabetes Medication:  None - currently holding insulin to trial need for continued use    Review of CGM download:    Days of sensor use: 7  Average glucose (mg/dL): 163  Target Ranges:  mg/dL          66% of time in range  33% of time above range  <1 % of time below range        Interpretation:  Data shows hyperglycemia after some meals as well as overnight in the absence of insulin.  Daily report shows one instance of reactive hypoglycemia in the absence of insulin, but otherwise appears patient is not having nearly as many lows as previously.     Patient Concerns:  - patient is going to [place] for 1 month, would like new plan by Saturday   - send any new meds to [address] [place] [address]     Follow up:   With Gladys Leon DO on 2024    Routed to covering provider for review.      May 23, 2024  Marbella Acevedo RN, MSN, BC-[facility], [facility]  Diabetes Care &      A total of 15 minutes was spent with the patient including chart review, discussion and education / advisement pertinent to patient and provider specified concerns as documented above.     Note to patient: The  Cures Act makes medical notes like these available to patients in the interest of transparency. However, be advised this is a medical document. It is intended as peer to peer communication. It is written in medical language and may contain abbreviations or verbiage that are unfamiliar. It may appear blunt or direct. Medical documents are intended to carry relevant information, facts as evident, and the clinical opinion of the practitioner.

## 2024-05-24 DIAGNOSIS — N18.6 TYPE 2 DIABETES MELLITUS WITH CHRONIC KIDNEY DISEASE ON CHRONIC DIALYSIS, WITH LONG-TERM CURRENT USE OF INSULIN (HCC): Primary | ICD-10-CM

## 2024-05-24 DIAGNOSIS — Z99.2 TYPE 2 DIABETES MELLITUS WITH CHRONIC KIDNEY DISEASE ON CHRONIC DIALYSIS, WITH LONG-TERM CURRENT USE OF INSULIN (HCC): Primary | ICD-10-CM

## 2024-05-24 DIAGNOSIS — Z79.4 TYPE 2 DIABETES MELLITUS WITH CHRONIC KIDNEY DISEASE ON CHRONIC DIALYSIS, WITH LONG-TERM CURRENT USE OF INSULIN (HCC): Primary | ICD-10-CM

## 2024-05-24 DIAGNOSIS — E11.22 TYPE 2 DIABETES MELLITUS WITH CHRONIC KIDNEY DISEASE ON CHRONIC DIALYSIS, WITH LONG-TERM CURRENT USE OF INSULIN (HCC): Primary | ICD-10-CM

## 2024-05-24 RX ORDER — LINAGLIPTIN 5 MG/1
5 TABLET, FILM COATED ORAL DAILY
Qty: 30 TABLET | Refills: 2 | Status: SHIPPED | OUTPATIENT
Start: 2024-05-24 | End: 2024-08-22

## 2024-06-03 RX ORDER — LENALIDOMIDE 5 MG/1
CAPSULE ORAL
Qty: 14 CAPSULE | Refills: 0 | Status: SHIPPED | OUTPATIENT
Start: 2024-06-03

## 2024-07-01 RX ORDER — LENALIDOMIDE 5 MG/1
CAPSULE ORAL
Qty: 14 CAPSULE | Refills: 0 | Status: SHIPPED | OUTPATIENT
Start: 2024-07-01

## 2024-07-10 DIAGNOSIS — E11.22 TYPE 2 DIABETES MELLITUS WITH CHRONIC KIDNEY DISEASE ON CHRONIC DIALYSIS, WITH LONG-TERM CURRENT USE OF INSULIN (HCC): ICD-10-CM

## 2024-07-10 DIAGNOSIS — Z79.4 TYPE 2 DIABETES MELLITUS WITH CHRONIC KIDNEY DISEASE ON CHRONIC DIALYSIS, WITH LONG-TERM CURRENT USE OF INSULIN (HCC): ICD-10-CM

## 2024-07-10 DIAGNOSIS — Z99.2 TYPE 2 DIABETES MELLITUS WITH CHRONIC KIDNEY DISEASE ON CHRONIC DIALYSIS, WITH LONG-TERM CURRENT USE OF INSULIN (HCC): ICD-10-CM

## 2024-07-10 DIAGNOSIS — N18.6 TYPE 2 DIABETES MELLITUS WITH CHRONIC KIDNEY DISEASE ON CHRONIC DIALYSIS, WITH LONG-TERM CURRENT USE OF INSULIN (HCC): ICD-10-CM

## 2024-07-10 NOTE — TELEPHONE ENCOUNTER
LOV:     RTC:    FU:     Last Refill: sent to different pharmacy    Month Supply Pendin days    Refill pended and routed for review.

## 2024-07-11 RX ORDER — LINAGLIPTIN 5 MG/1
5 TABLET, FILM COATED ORAL DAILY
Qty: 30 TABLET | Refills: 2 | Status: SHIPPED | OUTPATIENT
Start: 2024-07-11 | End: 2024-10-09

## 2024-07-12 ENCOUNTER — PATIENT MESSAGE (OUTPATIENT)
Facility: CLINIC | Age: 84
End: 2024-07-12

## 2024-07-12 RX ORDER — ROPINIROLE 1 MG/1
1 TABLET, FILM COATED ORAL NIGHTLY
Qty: 90 TABLET | Refills: 0 | Status: SHIPPED | OUTPATIENT
Start: 2024-07-12

## 2024-07-12 RX ORDER — ESCITALOPRAM OXALATE 20 MG/1
20 TABLET ORAL DAILY
Qty: 90 TABLET | Refills: 0 | Status: SHIPPED | OUTPATIENT
Start: 2024-07-12

## 2024-07-12 RX ORDER — ROSUVASTATIN CALCIUM 10 MG/1
10 TABLET, COATED ORAL NIGHTLY
Qty: 90 TABLET | Refills: 0 | Status: SHIPPED | OUTPATIENT
Start: 2024-07-12

## 2024-07-12 NOTE — TELEPHONE ENCOUNTER
Patient requesting new pharmacy- sending what is left on scripts to the new pharmacy.    Escitalopram- 90 days  Rosuvastatin-90 days

## 2024-07-12 NOTE — TELEPHONE ENCOUNTER
Please review; protocol failed/No Protocol    Please review pended refill request as unable to refill due to high/very high drug interaction warning copied here:   High  High Dose: escitalopram, 20 mg, Oral, DailySingle dose of 20 mg exceeds recommended maximum of 10 mg by 100%  Daily dose of 20 mg exceeds recommended maximum of 10 mg by 100%  Patient requesting a new pharmacy    Requested Prescriptions   Pending Prescriptions Disp Refills    rOPINIRole 1 MG Oral Tab 90 tablet 0     Sig: Take 1 tablet (1 mg total) by mouth nightly.       Neurology Medications Failed - 7/10/2024  9:16 AM        Failed - In person appointment or virtual visit in the past 6 mos or appointment in next 3 mos     Recent Outpatient Visits              1 month ago Type 2 diabetes mellitus with chronic kidney disease on chronic dialysis, with long-term current use of insulin ([facility])    [facility]    Nurse Only    2 months ago Type 2 diabetes mellitus with chronic kidney disease on chronic dialysis, with long-term current use of insulin ([facility])    [facility] Gladys Leon DO    Office Visit    2 months ago Multiple myeloma not having achieved remission ([facility])    [facility]    Office Visit    5 months ago Multiple myeloma not having achieved remission ([facility])    [facility]    Office Visit    7 months ago ESRD on hemodialysis ([facility])    [facility], [address] [place] [address] Baltazar Baker MD    Office Visit          Future Appointments         Provider Department Appt Notes    In 1 month Gladys Leon DO [facility] **Dr. Stevenson patient**  FU 3 months / Type 2 diabetes mellitus with chronic kidney disease on chronic dialysis, with long-term current use of insulin**Dexcom invite sent via email to  connect to clinic**  LOV 24 w/ Carolyn  **45 min modifier                      escitalopram 20 MG Oral Tab 90 tablet 1     Si mg. 1 tablet       Psychiatric Non-Scheduled (Anti-Anxiety) Failed - 7/10/2024  9:16 AM        Failed - In person appointment or virtual visit in the past 6 mos or appointment in next 3 mos     Recent Outpatient Visits              1 month ago Type 2 diabetes mellitus with chronic kidney disease on chronic dialysis, with long-term current use of insulin ([facility])    [facility]    Nurse Only    2 months ago Type 2 diabetes mellitus with chronic kidney disease on chronic dialysis, with long-term current use of insulin ([facility])    [facility] Gladys Leon DO    Office Visit    2 months ago Multiple myeloma not having achieved remission ([facility])    [facility]    Office Visit    5 months ago Multiple myeloma not having achieved remission ([facility])    [facility]    Office Visit    7 months ago ESRD on hemodialysis ([facility])    [facility] [address] [place] [address] Baltazar Baker MD    Office Visit          Future Appointments         Provider Department Appt Notes    In 1 month Gladys Leon DO [facility] **Dr. Stevenson patient**  FU 3 months / Type 2 diabetes mellitus with chronic kidney disease on chronic dialysis, with long-term current use of insulin**Dexcom invite sent via email to connect to clinic**  LOV 24 w/ Carolyn  **45 min modifier                    Passed - Depression Screening completed within the past 12 months          rosuvastatin 10 MG Oral Tab 90 tablet 1     Sig: Take 1 tablet (10 mg total) by mouth nightly.       Cholesterol Medication Protocol Failed - 7/10/2024  9:16 AM        Failed - Lipid panel within past 12 months     Lab  Results   Component Value Date    CHOLEST 101 06/15/2022    TRIG 71 06/15/2022    HDL 50 06/15/2022    LDL 36 06/15/2022    VLDL 10 09/27/2021    TCHDLRATIO 2.0 06/15/2022    NONHDLC 51 06/15/2022             Passed - ALT < 80     Lab Results   Component Value Date    ALT 17 04/24/2024             Passed - ALT resulted within past year        Passed - In person appointment or virtual visit in the past 12 mos or appointment in next 3 mos     Recent Outpatient Visits              1 month ago Type 2 diabetes mellitus with chronic kidney disease on chronic dialysis, with long-term current use of insulin ([facility])    [facility]    Nurse Only    2 months ago Type 2 diabetes mellitus with chronic kidney disease on chronic dialysis, with long-term current use of insulin ([facility])    [facility] Gladys Leon DO    Office Visit    2 months ago Multiple myeloma not having achieved remission ([facility])    [facility]    Office Visit    5 months ago Multiple myeloma not having achieved remission ([facility])    [facility]    Office Visit    7 months ago ESRD on hemodialysis ([facility])    [facility], [address] [place] [address] Baltazar Baker MD    Office Visit          Future Appointments         Provider Department Appt Notes    In 1 month Gladys Leon DO [facility] **Dr. Stevenson patient**  FU 3 months / Type 2 diabetes mellitus with chronic kidney disease on chronic dialysis, with long-term current use of insulin**Dexcom invite sent via email to connect to clinic**  LOV 5/8/24 w/ Carolyn  **45 min modifier                       Future Appointments         Provider Department Appt Notes    In 1 month Gladys Leon DO [facility] **Dr. Stevenson patient**  FU 3  months / Type 2 diabetes mellitus with chronic kidney disease on chronic dialysis, with long-term current use of insulin**Dexcom invite sent via email to connect to clinic**  LOV 5/8/24 w/ Carolyn  **45 min modifier          Recent Outpatient Visits              1 month ago Type 2 diabetes mellitus with chronic kidney disease on chronic dialysis, with long-term current use of insulin ([facility])    [facility]    Nurse Only    2 months ago Type 2 diabetes mellitus with chronic kidney disease on chronic dialysis, with long-term current use of insulin ([facility])    [facility] Gladys Leon DO    Office Visit    2 months ago Multiple myeloma not having achieved remission ([facility]    Office Visit    5 months ago 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

## 2024-07-15 NOTE — TELEPHONE ENCOUNTER
PA generated in [facility]    KEY: [id number]    Questions answered and sent to plan. Will await determination.    Approved today  Your request was approved based on the initial information provided at the time of the coverage request submission. Please allow additional time for the final decision to be made and added to the patient's account.    MCM update sent to patient.     Closing Encounter.

## 2024-10-14 RX ORDER — HYDRALAZINE HYDROCHLORIDE 10 MG/1
TABLET, FILM COATED ORAL
Qty: 180 TABLET | Refills: 0 | Status: SHIPPED | OUTPATIENT
Start: 2024-10-14

## 2024-10-14 NOTE — TELEPHONE ENCOUNTER
Please review; protocol failed/No Protocol    Requested Prescriptions   Pending Prescriptions Disp Refills    HYDRALAZINE 10 MG Oral Tab [Pharmacy Med Name: hydrALAZINE HCl 10 MG Oral Tablet] 180 tablet 0     Sig: TAKE 1 TABLET BY MOUTH THREE TIMES DAILY AS NEEDED (IF  OR GREATER) GIVE 10-20MG. GIVE 50MG IF  OR ABOVE       Hypertension Medications Protocol Failed - 10/14/2024  3:44 PM        Failed - Last BP reading less than 140/90     BP Readings from Last 1 Encounters:   05/08/24 (!) 164/90               Failed - EGFRCR or GFRNAA > 50     GFR Evaluation  EGFRCR: 7 , resulted on 4/24/2024          Passed - CMP or BMP in past 12 months        Passed - In person appointment or virtual visit in the past 12 mos or appointment in next 3 mos     Recent Outpatient Visits              4 months ago Type 2 diabetes mellitus with chronic kidney disease on chronic dialysis, with long-term current use of insulin ([facility])    [facility]    Nurse Only    5 months ago Type 2 diabetes mellitus with chronic kidney disease on chronic dialysis, with long-term current use of insulin ([facility])    [facility] Gladys Leon DO    Office Visit    5 months ago Multiple myeloma not having achieved remission ([facility])    [facility]    Office Visit    8 months ago Multiple myeloma not having achieved remission ([facility])    [facility]    Office Visit    10 months ago ESRD on hemodialysis ([facility])    [facility], [address] [place] [address] Baltazar Baker MD    Office Visit                           Recent Outpatient Visits              4 months ago Type 2 diabetes mellitus with chronic kidney disease on chronic dialysis, with long-term current use of insulin ([facility])    [facility]    Nurse Only     5 months ago Type 2 diabetes mellitus with chronic kidney disease on chronic dialysis, with long-term current use of insulin ([facility])    [facility] Gladys Leon DO    Office Visit    5 months ago Multiple myeloma not having achieved remission ([facility])    [facility]    Office Visit    8 months ago Multiple myeloma 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

## 2024-10-26 DIAGNOSIS — N18.6 TYPE 2 DIABETES MELLITUS WITH CHRONIC KIDNEY DISEASE ON CHRONIC DIALYSIS, WITH LONG-TERM CURRENT USE OF INSULIN (HCC): ICD-10-CM

## 2024-10-26 DIAGNOSIS — Z99.2 TYPE 2 DIABETES MELLITUS WITH CHRONIC KIDNEY DISEASE ON CHRONIC DIALYSIS, WITH LONG-TERM CURRENT USE OF INSULIN (HCC): ICD-10-CM

## 2024-10-26 DIAGNOSIS — Z79.4 TYPE 2 DIABETES MELLITUS WITH CHRONIC KIDNEY DISEASE ON CHRONIC DIALYSIS, WITH LONG-TERM CURRENT USE OF INSULIN (HCC): ICD-10-CM

## 2024-10-26 DIAGNOSIS — E11.22 TYPE 2 DIABETES MELLITUS WITH CHRONIC KIDNEY DISEASE ON CHRONIC DIALYSIS, WITH LONG-TERM CURRENT USE OF INSULIN (HCC): ICD-10-CM

## 2024-10-28 RX ORDER — LINAGLIPTIN 5 MG/1
5 TABLET, FILM COATED ORAL DAILY
Qty: 90 TABLET | Refills: 0 | Status: SHIPPED | OUTPATIENT
Start: 2024-10-28

## 2024-10-28 NOTE — TELEPHONE ENCOUNTER
Endocrine Refill protocol for oral and injectable diabetic medications    Protocol Criteria:  PASSED  Reason: N/A    If all below requirements are met, send a 90-day supply with 1 refill per provider protocol.    Verify appointment with Endocrinology completed in the last 6 months or scheduled in the next 3 months.  Verify A1C has been completed within the last 6 months and is below 8.5%     Last completed office visit: 5/8/2024 Gladys Leon DO   Next scheduled Follow up: No future appointments.   Last A1c result: Last A1c value was 5% done 5/8/2024.     Routed for review.

## 2025-06-05 DIAGNOSIS — E11.22 TYPE 2 DIABETES MELLITUS WITH CHRONIC KIDNEY DISEASE ON CHRONIC DIALYSIS, WITH LONG-TERM CURRENT USE OF INSULIN (HCC): ICD-10-CM

## 2025-06-05 DIAGNOSIS — Z99.2 TYPE 2 DIABETES MELLITUS WITH CHRONIC KIDNEY DISEASE ON CHRONIC DIALYSIS, WITH LONG-TERM CURRENT USE OF INSULIN (HCC): ICD-10-CM

## 2025-06-05 DIAGNOSIS — Z79.4 TYPE 2 DIABETES MELLITUS WITH CHRONIC KIDNEY DISEASE ON CHRONIC DIALYSIS, WITH LONG-TERM CURRENT USE OF INSULIN (HCC): ICD-10-CM

## 2025-06-05 DIAGNOSIS — N18.6 TYPE 2 DIABETES MELLITUS WITH CHRONIC KIDNEY DISEASE ON CHRONIC DIALYSIS, WITH LONG-TERM CURRENT USE OF INSULIN (HCC): ICD-10-CM

## 2025-06-05 RX ORDER — ACYCLOVIR 400 MG/1
TABLET ORAL
Qty: 9 EACH | Refills: 0 | Status: SHIPPED | OUTPATIENT
Start: 2025-06-05

## 2025-06-05 NOTE — TELEPHONE ENCOUNTER
Endocrine Refill protocol for CGM supplies     Protocol Criteria:  FAILED Reason: No Visit in required time frame    If below requirement is met, send a 90-day supply with 1 refill per provider protocol.     Verify appointment with Endocrinology completed in the last 12 months or scheduled in the next 6 months     Last completed office visit:Visit date not found   Last completed telemed visit: Visit date not found  Next scheduled Follow up: No future appointments.    Routed for review.    Sent my chart message to patient for follow up appointment.

## 2025-06-05 NOTE — TELEPHONE ENCOUNTER
Please schedule patient with Dr. Shell in 1-2 months for DM follow up (pt Kiswahili speaking). He will need to see her before getting further refills.

## 2025-06-20 NOTE — TELEPHONE ENCOUNTER
LOV 12/4/2023    Future Appointments   Date Time Provider Department Center   4/24/2024 11:00 AM Issa Hanna MD  [facility]   5/8/2024  9:30 AM Gladys Leon DO [id number]       escitalopram 20 MG Oral Tab 90 tablet 1 10/25/2023      Chest pain

## (undated) DIAGNOSIS — J02.9 SORE THROAT: ICD-10-CM

## (undated) DIAGNOSIS — Z20.822 CLOSE EXPOSURE TO COVID-19 VIRUS: Primary | ICD-10-CM

## (undated) DEVICE — STANDARD HYPODERMIC NEEDLE,POLYPROPYLENE HUB: Brand: MONOJECT

## (undated) DEVICE — 3M™ BAIR HUGGER® UNDERBODY BLANKET, FULL ACCESS, 10 PER CASE 63500: Brand: BAIR HUGGER™

## (undated) DEVICE — ABSORBABLE HEMOSTAT (OXIDIZED REGENERATED CELLULOSE, U.S.P.): Brand: SURGICEL

## (undated) DEVICE — SOLUTION  .9 1000ML BTL

## (undated) DEVICE — UNDYED BRAIDED (POLYGLACTIN 910), SYNTHETIC ABSORBABLE SUTURE: Brand: COATED VICRYL

## (undated) DEVICE — CONVERTORS STOCKINETTE: Brand: CONVERTORS

## (undated) DEVICE — 3M™ STERI-STRIP™ REINFORCED ADHESIVE SKIN CLOSURES, R1547, 1/2 IN X 4 IN (12 MM X 100 MM), 6 STRIPS/ENVELOPE: Brand: 3M™ STERI-STRIP™

## (undated) DEVICE — SUT VICRYL 3-0 CT-1 J258H

## (undated) DEVICE — SUT VICRYL 2-0 CT-1 J945H

## (undated) DEVICE — DRAPE,EXTREMITY,89X128,STERILE: Brand: MEDLINE

## (undated) DEVICE — SUT POLYDEK 4-0 TIES 6-932

## (undated) DEVICE — STRL PENROSE DRAIN 18" X 1/4": Brand: CARDINAL HEALTH

## (undated) DEVICE — STERILE POLYISOPRENE POWDER-FREE SURGICAL GLOVES: Brand: PROTEXIS

## (undated) DEVICE — CLOSURE EXOFIN 1.0ML

## (undated) DEVICE — SUT PROLENE 6-0 C-1 8726H

## (undated) DEVICE — CV PACK-LF: Brand: MEDLINE INDUSTRIES, INC.

## (undated) DEVICE — SLEEVE KENDALL SCD EXPRESS MED

## (undated) DEVICE — SOLUTION  .9 500ML

## (undated) DEVICE — Device: Brand: INTELLICART™

## (undated) DEVICE — SUT PROLENE 7-0 BV-1 8702H

## (undated) DEVICE — GAUZE SPONGES,12 PLY: Brand: CURITY

## (undated) DEVICE — SUT PROLENE 6-0 C-1 M8726

## (undated) DEVICE — GOWN AERO CHROME XXL

## (undated) DEVICE — SPONGE RAYTEC 4X4 RF DETECT

## (undated) DEVICE — FLOSEAL HEMOSTATIC MATRIX, 5ML: Brand: FLOSEAL HEMOSTATIC MATRIX

## (undated) DEVICE — SKIN MARKER DUAL TIP WITH RULER CAP AND LABELS: Brand: DEVON

## (undated) DEVICE — GEL AQUASONIC 100 20GR

## (undated) NOTE — LETTER
Date: 5/26/2021    Patient Name: Rosana Dc          To Whom it may concern: This letter has been written at the patient's request. The above patient was seen at the [facility] for treatment of a medical condition.     This is to certify

## (undated) NOTE — LETTER
[facility] [address], [address] [place] [address]  Consent for Procedure/Sedation  Date: 11/27/23         Time: 0    I hereby authorize Dr. Katja Oliva, my physician and his/her assistants (if applicable), which may include medical students, residents, and/or fellows, to perform the following surgical operation/ procedure and administer such anesthesia as may be determined necessary by my physician: Fistula gram, possible thrombolysis, possible stent, possible angioplasty, possible permanent dialysis catheter insertion on Sarika Darron  2. I recognize that during the surgical operation/procedure, unforeseen conditions may necessitate additional or different procedures than those listed above. I, therefore, further authorize and request that the above-named surgeon, assistants, or designees perform such procedures as are, in their judgment, necessary and desirable. 3.   My surgeon/physician has discussed prior to my surgery the potential benefits, risks and side effects of this procedure; the likelihood of achieving goals; and potential problems that might occur during recuperation. They also discussed reasonable alternatives to the procedure, including risks, benefits, and side effects related to the alternatives and risks related to not receiving this procedure. I have had all my questions answered and I acknowledge that no guarantee has been made as to the result that may be obtained. 4.   Should the need arise during my operation/procedure, which includes change of level of care prior to discharge, I also consent to the administration of blood and/or blood products. Further, I understand that despite careful testing and screening of blood or blood products by collecting agencies, I may still be subject to ill effects as a result of receiving a blood transfusion and/or blood products.   The following are some, but not all, of the potential risks that can occur: fever and allergic reactions, hemolytic reactions, transmission of diseases such as Hepatitis, AIDS and Cytomegalovirus (CMV) and fluid overload. In the event that I wish to have an autologous transfusion of my own blood, or a directed donor transfusion, I will discuss this with my physician. Check only if Refusing Blood or Blood Products  I understand refusal of blood or blood products as deemed necessary by my physician may have serious consequences to my condition to include possible death. I hereby assume responsibility for my refusal and release the hospital, its personnel, and my physicians from any responsibility for the consequences of my refusal.         o  Refuse         5. I authorize the use of any specimen, organs, tissues, body parts or foreign objects that may be removed from my body during the operation/procedure for diagnosis, research or teaching purposes and their subsequent disposal by hospital authorities. I also authorize the release of specimen test results and/or written reports to my treating physician on the hospital medical staff or other referring or consulting physicians involved in my care, at the discretion of the Pathologist or my treating physician. 6.   I consent to the photographing or videotaping of the operations or procedures to be performed, including appropriate portions of my body for medical, scientific, or educational purposes, provided my identity is not revealed by the pictures or by descriptive texts accompanying them. If the procedure has been photographed/videotaped, the surgeon will obtain the original picture, image, videotape or CD. The hospital will not be responsible for storage, release or maintenance of the picture, image, tape or CD.    7.   I consent to the presence of a  or observers in the operating room as deemed necessary by my physician or their designees.     8.   I recognize that in the event my procedure results in extended X-Ray/fluoroscopy time, I may develop a skin reaction. 9. If I have a Do Not Attempt Resuscitation (DNAR) order in place, that status will be suspended while in the operating room, procedural suite, and during the recovery period unless otherwise explicitly stated by me (or a person authorized to consent on my behalf). The surgeon or my attending physician will determine when the applicable recovery period ends for purposes of reinstating the DNAR order. 10. Patients having a sterilization procedure: I understand that if the procedure is successful the results will be permanent and it will therefore be impossible for me to inseminate, conceive, or bear children. I also understand that the procedure is intended to result in sterility, although the result has not been guaranteed. 11. I acknowledge that my physician has explained sedation/analgesia administration to me including the risk and benefits I consent to the administration of sedation/analgesia as may be necessary or desirable in the judgment of my physician.     I CERTIFY THAT I HAVE READ AND FULLY UNDERSTAND THE ABOVE CONSENT TO OPERATION and/or OTHER PROCEDURE.        ____________________________________       _________________________________      ______________________________  Signature of Patient         Signature of Responsible Person        Printed Name of Responsible Person        ____________________________________      _________________________________      ______________________________       Signature of Witness          Relationship to Patient                       Date                                       Time  Patient Name: Gena Funes     : 10/3/1940                 Printed: 2023      Medical Record #: [id number]                      Page 1 of 1

## (undated) NOTE — LETTER
Date: 5/4/2022    Patient Name: Sri Diaz          To Whom it may concern: This letter has been written at the patient's request. The above patient was seen at the [facility] for treatment of a medical condition. This patient should be excused from attending work/school from *** through ***. The patient may return to work/school on *** with the following limitations ***.         Sincerely,    LUIZ Bunn

## (undated) NOTE — Clinical Note
Carolyn Patient  Daughters are requesting updates to medication before patient goes out of town for a month (he is leaving Sunday).  Currently he is not taking any medications, were seeing if stable off insulin.  Has been off insulin since 5/8.  Dexcom report added to your g-drive folder

## (undated) NOTE — LETTER
Basilio Stevens M.D., F.A.C.S. Isaak Beverly M.D., F.A.C.S. Jill Rivera M.D., Trevor Wagner M.D., F.A.C.S. Alberto Maloney. Phillip Jones M.D., F.A.C.S. Brennon Rahman M.D. JANIE Frank M.D., F.A.C.S. Leanna Malin. Risa Bolden M.D., F.A.C.S. Tino Mckeon M.D., F.A.C.S. Neda Keys M.D., F.A.C.S. Yokasta Mckeon M.D. F.A.C.S. Anne-Marie Arellano. Carolyn Alves M.D., F.A.C.S. Pino Jackson M.D., F.A.C.S. Marva Harris M.D., F.A.C.S., F.A.C.C. Marilin Cortez M.D., F.A.C.S. Jah Brooks M.D., F.A.C.S. Jessica Kapadia M.D., F.A.C.S. Lolly Aldrich. Zackery Bennett M.D., F.A.C.S. Ga Eaton M.D. Amy Faust M.D., Trevor Juárez. C.MARYLU Subramanian M.D. Ayad Wise M.D., F.A.C.S. Carmen Elmore. Aden Fraser M.D., F.A.C.S. Evan Mcclure M.D. Phillip Montoya M.D.  Tameka Merino M.D. PhD.  Lorrie Hatchet, M.D. Sarah Berumen M.D. F A C SHemalatha Magallanes. Segun Oviedo M.D. Augustus Washington M.D. Scarlett Li M.D. Man Doyle M.D.   Beckie Conner D.O., F.A.C.S. Anny Murray M.D., F.A.C.S. Shani Thomas M.D. Welcome to [facility] As you contemplate possible surgical treatment, it is very important to us that you understand fully what is being discussed, that all of your questions have been answered, and that your options for treatment have been fully explained. To that end, on the following page we will ask you some questions to make certain that you understand everything which has been explained to you. Included in this understanding is that there are both surgical and nonsurgical treatments available for you, that you have options regarding where your care is given, and what doctors are involved in your care. Included in these options would, of course, be the option to elect for no treatment whatsoever.  We especially want to be sure that you have had a chance to have all of your questions and concerns answered. If there are any issues which have not been adequately addressed, we ask you to bring them forward so that we can thoroughly address them. A patient who is fully informed and understands their condition and options for treatment, as well as potential adverse effects of treatment, is going to be a patient who receives the most benefit out of care rendered. Our goal in addition to providing excellent surgical care is to provide the necessary information to you and your family in order to make decisions which are appropriate relative to your own care. Please take the time necessary to read and answer the questions on the next page. Again, if you have any questions, bring them forward and we will certainly address them. Sincerely,    [facility].    _______________________  ____________________________________  Date:                                             Patient Signature  ________________________  Rahul Connelly  Witness Consent Form         Revised: 2015  Patient Name: Rahul Connelly     : 10/3/1940                 Printed: 6/15/13 9:43 AM     Medical Record #: [id number]                Page: 1 of  2        [facility] Supplemental Consent Form    A [facility] surgeon has met with me and explained the matter of my illness, and what treatments might be available to improve my condition. As a result of that conversation, I understand the following:    A [facility] surgeon met with me and explained, in detail, the nature of my condition for which surgery is being contemplated. The procedure to be performed  Is: CREATION OF RIGHT ARM ARTERIOVENOUS FISTULA            Yes _____ No _____    A [facility] surgeon has explained to me that there are alternatives to surgery which might include no surgery, medical therapy, or interventional treatment, among other options and the risks and benefits of the different treatment options:     Yes _____ No _____    A [facility] surgeon as explained to me that if I should so desire, he/she is willing to explain my case and the surgical and non-surgical options to family members: Yes _____ No _____    A [facility] surgeon has answered all of my questions regarding the topics we have discussed. I have been invited to ask more questions:  Yes _____ No _____    A [facility] surgeon has explained to me that if I seek other options or wish treatment at another facility in [place] or [place], or anywhere in the [place], that his/her office will assist me in making such accommodations:   Yes _____ No _____    A [facility] surgeon has explained to me, that death, risk of bleeding, stroke, multi-organ failure, heart attack or other complications are risks for the proposed surgical procedure: Yes _____ No _____    A [facility] surgeon has explained to me that I have the right to cancel or postpone the surgery at any time prior to the start of surgery: Yes _____ No _____    The nature and options for treatment for my condition have been explained to me, in detail, by a [facility] surgeon and all questions have been answered to my satisfaction. I understand that I am not required to undergo surgery, and further, that if I so desire, I could have surgery accomplished by another surgeon or at another institution. I understand and accept that which has been explained to me.  I am able to make my decisions knowingly and willfully based on the data.    ______________________   __________________________________  Date       Patient Signature  ______________________________ Tucker King  Witness Consent Form   Patient Name: Tucker King     DATB: 10/3/1940                 Medical Record #: [id number]    Page: 2 of 2        Printed: November 28, 2022

## (undated) NOTE — LETTER
01/21/22        Carrie Rodriguez [id number]      Dear Laverne Fairly,    [facility] records indicate that you have outstanding lab work and or testing that was ordered for you and has not yet been completed:  Orders Placed This Encounter

## (undated) NOTE — LETTER
[facility] [address], [address] [place] [address]  Consent for Procedure/Sedation  Date: 11/24/2023         Time: 15:03    I hereby authorize Dr. Lv Coats, my physician and his/her assistants (if applicable), which may include medical students, residents, and/or fellows, to perform the following surgical operation/ procedure and administer such anesthesia as may be determined necessary by my physician: Temporary Dialysis Catheter Insertion on Sarika Darron  2. I recognize that during the surgical operation/procedure, unforeseen conditions may necessitate additional or different procedures than those listed above. I, therefore, further authorize and request that the above-named surgeon, assistants, or designees perform such procedures as are, in their judgment, necessary and desirable. 3.   My surgeon/physician has discussed prior to my surgery the potential benefits, risks and side effects of this procedure; the likelihood of achieving goals; and potential problems that might occur during recuperation. They also discussed reasonable alternatives to the procedure, including risks, benefits, and side effects related to the alternatives and risks related to not receiving this procedure. I have had all my questions answered and I acknowledge that no guarantee has been made as to the result that may be obtained. 4.   Should the need arise during my operation/procedure, which includes change of level of care prior to discharge, I also consent to the administration of blood and/or blood products. Further, I understand that despite careful testing and screening of blood or blood products by collecting agencies, I may still be subject to ill effects as a result of receiving a blood transfusion and/or blood products.   The following are some, but not all, of the potential risks that can occur: fever and allergic reactions, hemolytic reactions, transmission of diseases such as Hepatitis, AIDS and Cytomegalovirus (CMV) and fluid overload. In the event that I wish to have an autologous transfusion of my own blood, or a directed donor transfusion, I will discuss this with my physician. Check only if Refusing Blood or Blood Products  I understand refusal of blood or blood products as deemed necessary by my physician may have serious consequences to my condition to include possible death. I hereby assume responsibility for my refusal and release the hospital, its personnel, and my physicians from any responsibility for the consequences of my refusal.         o  Refuse         5. I authorize the use of any specimen, organs, tissues, body parts or foreign objects that may be removed from my body during the operation/procedure for diagnosis, research or teaching purposes and their subsequent disposal by hospital authorities. I also authorize the release of specimen test results and/or written reports to my treating physician on the hospital medical staff or other referring or consulting physicians involved in my care, at the discretion of the Pathologist or my treating physician. 6.   I consent to the photographing or videotaping of the operations or procedures to be performed, including appropriate portions of my body for medical, scientific, or educational purposes, provided my identity is not revealed by the pictures or by descriptive texts accompanying them. If the procedure has been photographed/videotaped, the surgeon will obtain the original picture, image, videotape or CD. The hospital will not be responsible for storage, release or maintenance of the picture, image, tape or CD.    7.   I consent to the presence of a  or observers in the operating room as deemed necessary by my physician or their designees. 8.   I recognize that in the event my procedure results in extended X-Ray/fluoroscopy time, I may develop a skin reaction. 9.  If I have a Do Not Attempt Resuscitation (DNAR) order in place, that status will be suspended while in the operating room, procedural suite, and during the recovery period unless otherwise explicitly stated by me (or a person authorized to consent on my behalf). The surgeon or my attending physician will determine when the applicable recovery period ends for purposes of reinstating the DNAR order. 10. Patients having a sterilization procedure: I understand that if the procedure is successful the results will be permanent and it will therefore be impossible for me to inseminate, conceive, or bear children. I also understand that the procedure is intended to result in sterility, although the result has not been guaranteed. 11. I acknowledge that my physician has explained sedation/analgesia administration to me including the risk and benefits I consent to the administration of sedation/analgesia as may be necessary or desirable in the judgment of my physician.     I CERTIFY THAT I HAVE READ AND FULLY UNDERSTAND THE ABOVE CONSENT TO OPERATION and/or OTHER PROCEDURE.        ____________________________________       _________________________________      ______________________________  Signature of Patient         Signature of Responsible Person        Printed Name of Responsible Person        ____________________________________      _________________________________      ______________________________       Signature of Witness          Relationship to Patient                       Date                                       Time  Patient Name: Kalpana Minor     : 10/3/1940                 Printed: 2023      Medical Record #: [id number]                      Page 1 of 1

## (undated) NOTE — LETTER
[address]  [address]  Consent for Procedure/Sedation  Date: 2/7/24         Time: 8320    I hereby authorize Dr Liu, my physician and his/her assistants (if applicable), which may include medical students, residents, and/or fellows, to perform the following surgical operation/ procedure and administer such anesthesia as may be determined necessary by my physician: Permanent Dialysis Catheter insertion on Sarika Darron  2.   I recognize that during the surgical operation/procedure, unforeseen conditions may necessitate additional or different procedures than those listed above.  I, therefore, further authorize and request that the above-named surgeon, assistants, or designees perform such procedures as are, in their judgment, necessary and desirable.    3.   My surgeon/physician has discussed prior to my surgery the potential benefits, risks and side effects of this procedure; the likelihood of achieving goals; and potential problems that might occur during recuperation.  They also discussed reasonable alternatives to the procedure, including risks, benefits, and side effects related to the alternatives and risks related to not receiving this procedure.  I have had all my questions answered and I acknowledge that no guarantee has been made as to the result that may be obtained.    4.   Should the need arise during my operation/procedure, which includes change of level of care prior to discharge, I also consent to the administration of blood and/or blood products.  Further, I understand that despite careful testing and screening of blood or blood products by collecting agencies, I may still be subject to ill effects as a result of receiving a blood transfusion and/or blood products.  The following are some, but not all, of the potential risks that can occur: fever and allergic reactions, hemolytic reactions, transmission of diseases such as Hepatitis, AIDS and Cytomegalovirus (CMV)  and fluid overload.  In the event that I wish to have an autologous transfusion of my own blood, or a directed donor transfusion, I will discuss this with my physician.   Check only if Refusing Blood or Blood Products  I understand refusal of blood or blood products as deemed necessary by my physician may have serious consequences to my condition to include possible death. I hereby assume responsibility for my refusal and release the hospital, its personnel, and my physicians from any responsibility for the consequences of my refusal.         o  Refuse         5.   I authorize the use of any specimen, organs, tissues, body parts or foreign objects that may be removed from my body during the operation/procedure for diagnosis, research or teaching purposes and their subsequent disposal by hospital authorities.  I also authorize the release of specimen test results and/or written reports to my treating physician on the hospital medical staff or other referring or consulting physicians involved in my care, at the discretion of the Pathologist or my treating physician.    6.   I consent to the photographing or videotaping of the operations or procedures to be performed, including appropriate portions of my body for medical, scientific, or educational purposes, provided my identity is not revealed by the pictures or by descriptive texts accompanying them.  If the procedure has been photographed/videotaped, the surgeon will obtain the original picture, image, videotape or CD.  The hospital will not be responsible for storage, release or maintenance of the picture, image, tape or CD.    7.   I consent to the presence of a  or observers in the operating room as deemed necessary by my physician or their designees.    8.   I recognize that in the event my procedure results in extended X-Ray/fluoroscopy time, I may develop a skin reaction.    9. If I have a Do Not Attempt Resuscitation (DNAR) order in place,  that status will be suspended while in the operating room, procedural suite, and during the recovery period unless otherwise explicitly stated by me (or a person authorized to consent on my behalf). The surgeon or my attending physician will determine when the applicable recovery period ends for purposes of reinstating the DNAR order.  10. Patients having a sterilization procedure: I understand that if the procedure is successful the results will be permanent and it will therefore be impossible for me to inseminate, conceive, or bear children.  I also understand that the procedure is intended to result in sterility, although the result has not been guaranteed.   11. I acknowledge that my physician has explained sedation/analgesia administration to me including the risk and benefits I consent to the administration of sedation/analgesia as may be necessary or desirable in the judgment of my physician.    I CERTIFY THAT I HAVE READ AND FULLY UNDERSTAND THE ABOVE CONSENT TO OPERATION and/or OTHER PROCEDURE.        ____________________________________       _________________________________      ______________________________  Signature of Patient         Signature of Responsible Person        Printed Name of Responsible Person        ____________________________________      _________________________________      ______________________________       Signature of Witness          Relationship to Patient                       Date                                       Time  Patient Name: Sarika Rob     : 10/3/1940                 Printed: 2024      Medical Record #: [id number]                      Page 1 of 1